# Patient Record
Sex: MALE | Race: WHITE | NOT HISPANIC OR LATINO | URBAN - METROPOLITAN AREA
[De-identification: names, ages, dates, MRNs, and addresses within clinical notes are randomized per-mention and may not be internally consistent; named-entity substitution may affect disease eponyms.]

---

## 2022-08-05 ENCOUNTER — EMERGENCY (EMERGENCY)
Facility: HOSPITAL | Age: 52
LOS: 1 days | Discharge: HOME | End: 2022-08-05
Admitting: EMERGENCY MEDICINE

## 2022-08-05 VITALS
WEIGHT: 205.03 LBS | SYSTOLIC BLOOD PRESSURE: 154 MMHG | HEIGHT: 70 IN | TEMPERATURE: 98 F | DIASTOLIC BLOOD PRESSURE: 105 MMHG | OXYGEN SATURATION: 96 % | HEART RATE: 102 BPM | RESPIRATION RATE: 20 BRPM

## 2022-08-05 PROCEDURE — 72125 CT NECK SPINE W/O DYE: CPT | Mod: 26

## 2022-08-05 PROCEDURE — 99285 EMERGENCY DEPT VISIT HI MDM: CPT | Mod: 25

## 2022-08-05 PROCEDURE — 70450 CT HEAD/BRAIN W/O DYE: CPT | Mod: 26

## 2022-08-05 PROCEDURE — 12001 RPR S/N/AX/GEN/TRNK 2.5CM/<: CPT

## 2022-08-05 PROCEDURE — 93010 ELECTROCARDIOGRAM REPORT: CPT | Mod: NC,59

## 2022-08-05 NOTE — ED ADULT TRIAGE NOTE - CHIEF COMPLAINT QUOTE
Pt walked into ER with S/O c/o head injury/laceration after fall, Pt admits to drinking heavily all day. Pt does not recall falling or LOC. S/O reports Pt calling her to  from [facility]. Unable to assess laceration at triage due to dry blood. Pt denies PMH/NKDA. Pt walked into ER with S/O c/o head injury/laceration after fall, Pt admits to drinking heavily all day. Pt does not recall falling or LOC. S/O reports Pt calling her to  from [facility]. Unable to assess laceration at triage due to dry blood. Pt denies PMH/NKDA. Pt unstable on feet, wheeled to room with S/O.

## 2022-08-05 NOTE — ED ADULT NURSE NOTE - OBJECTIVE STATEMENT
Pt presents to ED complaining of head injury sustained in fall today. Pt arrives with lac, bleeding well controlled. Unclear to LOC. Last tdap unk. Pt intoxicated, poor historian to events.

## 2022-08-05 NOTE — ED ADULT NURSE NOTE - CHIEF COMPLAINT QUOTE
Pt walked into ER with S/O c/o head injury/laceration after fall, Pt admits to drinking heavily all day. Pt does not recall falling or LOC. S/O reports Pt calling her to  from [facility]. Unable to assess laceration at triage due to dry blood. Pt denies PMH/NKDA. Pt unstable on feet, wheeled to room with S/O.

## 2022-08-06 VITALS
TEMPERATURE: 98 F | SYSTOLIC BLOOD PRESSURE: 130 MMHG | RESPIRATION RATE: 18 BRPM | OXYGEN SATURATION: 98 % | HEART RATE: 87 BPM | DIASTOLIC BLOOD PRESSURE: 87 MMHG

## 2022-08-06 NOTE — ED PROVIDER NOTE - OBJECTIVE STATEMENT
51 yo m pmhx sig for chronic etohism pw acute onset of closed head injury w/o loc, pt fell backwards 2 hr pta while drinking ETOH hitting the back of the head against ground with bleeding that stopped with pressure, no ams, no focal deficits. Denies hand numbness and paresthesias.    I have reviewed available current nursing and previous documentation of past medical, surgical, family, and/or social history.

## 2022-08-06 NOTE — ED PROVIDER NOTE - CLINICAL SUMMARY MEDICAL DECISION MAKING FREE TEXT BOX
pt pw closed head injury w/o loc with lac on the occiput with small hematoma and small abrasion on parietal -- neurologically intact. plan: ct brain/jaylainedevonteap

## 2022-08-06 NOTE — ED PROVIDER NOTE - PHYSICAL EXAMINATION
Physical Exam    Vital Signs: I have reviewed the initial vital signs.  Constitutional: well-nourished, appears stated age, no acute distress  Head: + occipital hematoma with 1 cm laceration and L parietal scalp abrasion with small hematoma   Eyes: PERRLA, EOM intact, RAPD absent, and symmetrical lids.  ENT: neck supple with no adenopathy, moist MM.  Cardiovascular: +S1/S2, no murmurs, regular rate, regular rhythm, well-perfused extremities  Respiratory: unlabored respiratory effort, clear to auscultation bilaterally, speaks in full sentences  Gastrointestinal: soft, non-tender abdomen, no pulsatile mass  Neurologic: awake, alert, cranial nerves II-XII grossly intact, extremities’ motor and sensory functions grossly intact, no ataxia, no dysmetria

## 2022-08-06 NOTE — ED PROVIDER NOTE - NSFOLLOWUPINSTRUCTIONS_ED_ALL_ED_FT
Laceration -- Return in 10 days for staple removal    A laceration is a cut that goes through all of the layers of the skin and into the tissue that is right under the skin. Some lacerations heal on their own. Others need to be closed with stitches (sutures), staples, skin adhesive strips, or skin glue. Proper laceration care minimizes the risk of infection and helps the laceration to heal better.     SEEK IMMEDIATE MEDICAL CARE IF YOU HAVE THE FOLLOWING SYMPTOMS: swelling around the wound, worsening pain, drainage from the wound, red streaking going away from your wound, inability to move finger or toe near the laceration, or discoloration of skin near the laceration.       Closed Head Injury    Closed head injury in an injury to your head that may or may not involve a traumatic brain injury (TBI). Symptoms of TBI can be short or long lasting and include headache, dizziness, interference with memory or speech, fatigue, confusion, changes in sleep, mood changes, nausea, depression/anxiety, and dulling of senses. Make sure to obtain proper rest which includes getting plenty of sleep, avoiding excessive visual stimulation, and avoiding activities that may cause physical or mental stress. Avoid any situation where there is potential for another head injury including sports.    SEEK MEDICAL CARE IF YOU HAVE THE FOLLOWING SYMPTOMS: unusual drowsiness, vomiting, severe dizziness, seizures, lightheadedness, muscular weakness, different pupil sizes, visual changes, or clear or bloody discharge from your ears or nose.

## 2022-08-06 NOTE — ED PROVIDER NOTE - PATIENT PORTAL LINK FT
You can access the FollowMyHealth Patient Portal offered by [facility] by registering at the following website: [email or web address]. By joining [employer]’s FollowMyHealth portal, you will also be able to view your health information using other applications (apps) compatible with our system.
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
## 2022-08-08 DIAGNOSIS — R00.0 TACHYCARDIA, UNSPECIFIED: ICD-10-CM

## 2022-08-08 DIAGNOSIS — F10.20 ALCOHOL DEPENDENCE, UNCOMPLICATED: ICD-10-CM

## 2022-08-08 DIAGNOSIS — S09.90XA UNSPECIFIED INJURY OF HEAD, INITIAL ENCOUNTER: ICD-10-CM
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
## 2022-08-08 DIAGNOSIS — S01.01XA LACERATION WITHOUT FOREIGN BODY OF SCALP, INITIAL ENCOUNTER: ICD-10-CM

## 2022-08-08 LAB — GLUCOSE BLDC GLUCOMTR-MCNC: 105 MG/DL — HIGH (ref 70–99)

## 2022-08-22 ENCOUNTER — EMERGENCY (EMERGENCY)
Facility: HOSPITAL | Age: 52
LOS: 1 days | Discharge: HOME | End: 2022-08-22
Admitting: EMERGENCY MEDICINE

## 2022-08-22 VITALS
OXYGEN SATURATION: 98 % | HEIGHT: 70 IN | DIASTOLIC BLOOD PRESSURE: 87 MMHG | SYSTOLIC BLOOD PRESSURE: 146 MMHG | WEIGHT: 199.96 LBS | TEMPERATURE: 99 F | RESPIRATION RATE: 18 BRPM | HEART RATE: 106 BPM
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
## 2022-08-22 DIAGNOSIS — S01.91XD LACERATION WITHOUT FOREIGN BODY OF UNSPECIFIED PART OF HEAD, SUBSEQUENT ENCOUNTER: ICD-10-CM

## 2022-08-22 PROCEDURE — L9995: CPT

## 2022-08-22 NOTE — ED PROVIDER NOTE - CHPI ED SYMPTOMS POS
Caller verified medication dosage, frequency and dispense quantity. The medication(s) are set up as pending and waiting for your approval. The preferred pharmacy has been set up and verified.   
Pt aware prescription refilled. Aware the frequency was changed to every 6hrs and that this will be last refill.   
LACERATION

## 2022-08-22 NOTE — ED PROVIDER NOTE - PATIENT PORTAL LINK FT
You can access the FollowMyHealth Patient Portal offered by [facility] by registering at the following website: [email or web address]. By joining [employer]’s FollowMyHealth portal, you will also be able to view your health information using other applications (apps) compatible with our system.

## 2022-08-22 NOTE — ED ADULT TRIAGE NOTE - BRAND OF COVID-19 VACCINATION
Daily Note     Today's date: 2018  Patient name: Royal Molina  : 1939  MRN: [id number]  Referring provider: Rae Evans MD  Dx:   Encounter Diagnosis     ICD-10-CM    1  Primary osteoarthritis of left knee M17 12    2  Chronic pain of left knee M25 562     G89 29    3  History of total left knee replacement Z96 652                   Subjective: Patient reports he gets staples out tomorrow  No new complaints  Objective: See treatment diary below      Assessment: Patient tolerated session well today  He had no difficulty with added stairs today and has good form and tolerance to new exercises  Patient continues to improve well with function and slowly with mobility  He had atient would benefit from continued PT  Plan: Progress treatment as tolerated           Manual           PROM NV 10' 10' 10'                                                                 Exercise Diary           bike NV NV 5' 6'         Heel slides 10x 15x 10x 15x         SLR 10x 2x10 2x10  2x10          Quad sets NV 10x10s 10x10s 10x10s         SAQ NV 2x10 2" hold 2x10  2x10 3 sec hold          Mini squats  NV x10  2x10           Heel raises  10x 2x10  2x10  2x10          Bridges  NV NV NV np         Standing hip abd NV x10 2x10 2x10         Standing hip ext NV x10 2x10 2x10         Step ups     6" step x 15                                                                                                                     Modalities            CP PRN  8' 10' 8'
Moderna dose 1, 2, and 3

## 2022-08-22 NOTE — ED PROVIDER NOTE - OBJECTIVE STATEMENT
51 y/o male with no PMHx coming in for a staple removal. Patient had 3 staples placed into his head. Denies fever/chills, weakness, and numbness.

## 2022-08-22 NOTE — ED PROVIDER NOTE - CLINICAL SUMMARY MEDICAL DECISION MAKING FREE TEXT BOX
51 y/o Male with no PMHx coming in for a staple removal from his head. Exam unremarkable. Removed 3 staples from his head and anticipate DC.

## 2023-01-01 ENCOUNTER — RESULT REVIEW (OUTPATIENT)
Age: 53
End: 2023-01-01

## 2023-01-01 ENCOUNTER — INPATIENT (INPATIENT)
Facility: HOSPITAL | Age: 53
LOS: 7 days | DRG: 870 | End: 2023-09-22
Attending: INTERNAL MEDICINE | Admitting: INTERNAL MEDICINE
Payer: COMMERCIAL

## 2023-01-01 VITALS
SYSTOLIC BLOOD PRESSURE: 97 MMHG | HEART RATE: 90 BPM | DIASTOLIC BLOOD PRESSURE: 38 MMHG | OXYGEN SATURATION: 100 % | RESPIRATION RATE: 20 BRPM

## 2023-01-01 VITALS — OXYGEN SATURATION: 84 %

## 2023-01-01 DIAGNOSIS — Z71.89 OTHER SPECIFIED COUNSELING: ICD-10-CM

## 2023-01-01 DIAGNOSIS — N17.9 ACUTE KIDNEY FAILURE, UNSPECIFIED: ICD-10-CM

## 2023-01-01 DIAGNOSIS — J96.01 ACUTE RESPIRATORY FAILURE WITH HYPOXIA: ICD-10-CM
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
## 2023-01-01 DIAGNOSIS — Z51.5 ENCOUNTER FOR PALLIATIVE CARE: ICD-10-CM

## 2023-01-01 DIAGNOSIS — K74.60 UNSPECIFIED CIRRHOSIS OF LIVER: ICD-10-CM

## 2023-01-01 DIAGNOSIS — R53.2 FUNCTIONAL QUADRIPLEGIA: ICD-10-CM

## 2023-01-01 DIAGNOSIS — R52 PAIN, UNSPECIFIED: ICD-10-CM

## 2023-01-01 LAB
-  CLINDAMYCIN: SIGNIFICANT CHANGE UP
-  ERYTHROMYCIN: SIGNIFICANT CHANGE UP
-  LINEZOLID: SIGNIFICANT CHANGE UP
-  OXACILLIN: SIGNIFICANT CHANGE UP
-  RIFAMPIN: SIGNIFICANT CHANGE UP
-  TRIMETHOPRIM/SULFAMETHOXAZOLE: SIGNIFICANT CHANGE UP
-  VANCOMYCIN: SIGNIFICANT CHANGE UP
1,3 BETA GLUCAN SER QL: NEGATIVE — SIGNIFICANT CHANGE UP
A1AT SERPL-MCNC: 159 MG/DL — SIGNIFICANT CHANGE UP (ref 90–200)
ACANTHOCYTES BLD QL SMEAR: SLIGHT — SIGNIFICANT CHANGE UP
ALBUMIN SERPL ELPH-MCNC: 2 G/DL — LOW (ref 3.4–5)
ALBUMIN SERPL ELPH-MCNC: 2.1 G/DL — LOW (ref 3.3–5)
ALBUMIN SERPL ELPH-MCNC: 2.4 G/DL — LOW (ref 3.3–5)
ALBUMIN SERPL ELPH-MCNC: 2.6 G/DL — LOW (ref 3.3–5)
ALBUMIN SERPL ELPH-MCNC: 2.8 G/DL — LOW (ref 3.3–5)
ALBUMIN SERPL ELPH-MCNC: 2.9 G/DL — LOW (ref 3.3–5)
ALBUMIN SERPL ELPH-MCNC: 3 G/DL — LOW (ref 3.3–5)
ALBUMIN SERPL ELPH-MCNC: 3.1 G/DL — LOW (ref 3.3–5)
ALBUMIN SERPL ELPH-MCNC: 3.2 G/DL — LOW (ref 3.3–5)
ALBUMIN SERPL ELPH-MCNC: 3.3 G/DL — SIGNIFICANT CHANGE UP (ref 3.3–5)
ALBUMIN SERPL ELPH-MCNC: 3.7 G/DL — SIGNIFICANT CHANGE UP (ref 3.3–5)
ALBUMIN SERPL ELPH-MCNC: 4 G/DL — SIGNIFICANT CHANGE UP (ref 3.3–5)
ALBUMIN SERPL ELPH-MCNC: 5.2 G/DL — HIGH (ref 3.3–5)
ALP SERPL-CCNC: 105 U/L — SIGNIFICANT CHANGE UP (ref 40–120)
ALP SERPL-CCNC: 134 U/L — HIGH (ref 40–120)
ALP SERPL-CCNC: 144 U/L — HIGH (ref 40–120)
ALP SERPL-CCNC: 153 U/L — HIGH (ref 40–120)
ALP SERPL-CCNC: 157 U/L — HIGH (ref 40–120)
ALP SERPL-CCNC: 157 U/L — HIGH (ref 40–120)
ALP SERPL-CCNC: 171 U/L — HIGH (ref 40–120)
ALP SERPL-CCNC: 173 U/L — HIGH (ref 40–120)
ALP SERPL-CCNC: 96 U/L — SIGNIFICANT CHANGE UP (ref 40–120)
ALP SERPL-CCNC: 97 U/L — SIGNIFICANT CHANGE UP (ref 40–120)
ALP SERPL-CCNC: 97 U/L — SIGNIFICANT CHANGE UP (ref 40–120)
ALP SERPL-CCNC: 99 U/L — SIGNIFICANT CHANGE UP (ref 40–120)
ALP SERPL-CCNC: 99 U/L — SIGNIFICANT CHANGE UP (ref 40–120)
ALT FLD-CCNC: 40 U/L — SIGNIFICANT CHANGE UP (ref 10–45)
ALT FLD-CCNC: 41 U/L — SIGNIFICANT CHANGE UP (ref 10–45)
ALT FLD-CCNC: 41 U/L — SIGNIFICANT CHANGE UP (ref 10–45)
ALT FLD-CCNC: 42 U/L — SIGNIFICANT CHANGE UP (ref 10–45)
ALT FLD-CCNC: 43 U/L — SIGNIFICANT CHANGE UP (ref 10–45)
ALT FLD-CCNC: 43 U/L — SIGNIFICANT CHANGE UP (ref 10–45)
ALT FLD-CCNC: 46 U/L — HIGH (ref 12–42)
ALT FLD-CCNC: 47 U/L — HIGH (ref 10–45)
ALT FLD-CCNC: 57 U/L — HIGH (ref 10–45)
ALT FLD-CCNC: 62 U/L — HIGH (ref 10–45)
ALT FLD-CCNC: 73 U/L — HIGH (ref 10–45)
AMMONIA BLD-MCNC: 120 UMOL/L — HIGH (ref 11–55)
AMMONIA BLD-MCNC: 152 UMOL/L — HIGH (ref 11–55)
AMMONIA BLD-MCNC: 169 UMOL/L — HIGH (ref 11–32)
AMMONIA BLD-MCNC: 182 UMOL/L — HIGH (ref 11–55)
AMPHET UR-MCNC: NEGATIVE — SIGNIFICANT CHANGE UP
ANA TITR SER: NEGATIVE — SIGNIFICANT CHANGE UP
ANION GAP SERPL CALC-SCNC: 13 MMOL/L — SIGNIFICANT CHANGE UP (ref 5–17)
ANION GAP SERPL CALC-SCNC: 13 MMOL/L — SIGNIFICANT CHANGE UP (ref 5–17)
ANION GAP SERPL CALC-SCNC: 14 MMOL/L — SIGNIFICANT CHANGE UP (ref 5–17)
ANION GAP SERPL CALC-SCNC: 15 MMOL/L — SIGNIFICANT CHANGE UP (ref 5–17)
ANION GAP SERPL CALC-SCNC: 15 MMOL/L — SIGNIFICANT CHANGE UP (ref 5–17)
ANION GAP SERPL CALC-SCNC: 15 MMOL/L — SIGNIFICANT CHANGE UP (ref 9–16)
ANION GAP SERPL CALC-SCNC: 16 MMOL/L — SIGNIFICANT CHANGE UP (ref 5–17)
ANION GAP SERPL CALC-SCNC: 17 MMOL/L — SIGNIFICANT CHANGE UP (ref 5–17)
ANION GAP SERPL CALC-SCNC: 17 MMOL/L — SIGNIFICANT CHANGE UP (ref 5–17)
ANION GAP SERPL CALC-SCNC: 18 MMOL/L — HIGH (ref 5–17)
ANION GAP SERPL CALC-SCNC: 19 MMOL/L — HIGH (ref 5–17)
ANION GAP SERPL CALC-SCNC: 19 MMOL/L — HIGH (ref 5–17)
ANION GAP SERPL CALC-SCNC: 20 MMOL/L — HIGH (ref 5–17)
ANION GAP SERPL CALC-SCNC: 21 MMOL/L — HIGH (ref 5–17)
ANISOCYTOSIS BLD QL: SIGNIFICANT CHANGE UP
ANISOCYTOSIS BLD QL: SLIGHT — SIGNIFICANT CHANGE UP
APAP SERPL-MCNC: <5 UG/ML — LOW (ref 10–30)
APAP SERPL-MCNC: <5 UG/ML — LOW (ref 10–30)
APPEARANCE UR: ABNORMAL
APPEARANCE UR: ABNORMAL
APTT BLD: 46.6 SEC — HIGH (ref 24.5–35.6)
APTT BLD: 48.9 SEC — HIGH (ref 24.5–35.6)
APTT BLD: 51 SEC — HIGH (ref 24.5–35.6)
APTT BLD: 51.4 SEC — HIGH (ref 24.5–35.6)
APTT BLD: 51.4 SEC — HIGH (ref 24.5–35.6)
APTT BLD: 51.9 SEC — HIGH (ref 24.5–35.6)
APTT BLD: 52.4 SEC — HIGH (ref 24.5–35.6)
APTT BLD: 54.6 SEC — HIGH (ref 24.5–35.6)
APTT BLD: 55.1 SEC — HIGH (ref 24.5–35.6)
APTT BLD: 67.7 SEC — HIGH (ref 24.5–35.6)
APTT BLD: 68.5 SEC — HIGH (ref 24.5–35.6)
APTT BLD: 70.8 SEC — HIGH (ref 24.5–35.6)
APTT BLD: 74.2 SEC — HIGH (ref 24.5–35.6)
APTT BLD: 81.6 SEC — HIGH (ref 24.5–35.6)
AST SERPL-CCNC: 102 U/L — HIGH (ref 10–40)
AST SERPL-CCNC: 103 U/L — HIGH (ref 10–40)
AST SERPL-CCNC: 105 U/L — HIGH (ref 10–40)
AST SERPL-CCNC: 110 U/L — HIGH (ref 10–40)
AST SERPL-CCNC: 113 U/L — HIGH (ref 10–40)
AST SERPL-CCNC: 119 U/L — HIGH (ref 10–40)
AST SERPL-CCNC: 119 U/L — HIGH (ref 10–40)
AST SERPL-CCNC: 129 U/L — HIGH (ref 15–37)
AST SERPL-CCNC: 131 U/L — HIGH (ref 10–40)
AST SERPL-CCNC: 140 U/L — HIGH (ref 10–40)
AST SERPL-CCNC: 172 U/L — HIGH (ref 10–40)
AST SERPL-CCNC: 211 U/L — HIGH (ref 10–40)
AST SERPL-CCNC: 99 U/L — HIGH (ref 10–40)
B PERT IGG+IGM PNL SER: SIGNIFICANT CHANGE UP
BACTERIA # UR AUTO: ABNORMAL /HPF
BACTERIA # UR AUTO: PRESENT /HPF
BARBITURATES UR SCN-MCNC: NEGATIVE — SIGNIFICANT CHANGE UP
BASE EXCESS BLDA CALC-SCNC: -3.4 MMOL/L — LOW (ref -2–3)
BASE EXCESS BLDA CALC-SCNC: -5 MMOL/L — LOW (ref -2–3)
BASE EXCESS BLDA CALC-SCNC: -6.7 MMOL/L — LOW (ref -2–3)
BASE EXCESS BLDA CALC-SCNC: -9 MMOL/L — LOW (ref -2–3)
BASE EXCESS BLDA CALC-SCNC: -9.6 MMOL/L — LOW (ref -2–3)
BASE EXCESS BLDV CALC-SCNC: -6.1 MMOL/L — LOW (ref -2–3)
BASO STIPL BLD QL SMEAR: PRESENT — SIGNIFICANT CHANGE UP
BASO STIPL BLD QL SMEAR: PRESENT — SIGNIFICANT CHANGE UP
BASOPHILS # BLD AUTO: 0 K/UL — SIGNIFICANT CHANGE UP (ref 0–0.2)
BASOPHILS # BLD AUTO: 0.01 K/UL — SIGNIFICANT CHANGE UP (ref 0–0.2)
BASOPHILS # BLD AUTO: 0.01 K/UL — SIGNIFICANT CHANGE UP (ref 0–0.2)
BASOPHILS # BLD AUTO: 0.02 K/UL — SIGNIFICANT CHANGE UP (ref 0–0.2)
BASOPHILS # BLD AUTO: 0.02 K/UL — SIGNIFICANT CHANGE UP (ref 0–0.2)
BASOPHILS # BLD AUTO: 0.04 K/UL — SIGNIFICANT CHANGE UP (ref 0–0.2)
BASOPHILS NFR BLD AUTO: 0 % — SIGNIFICANT CHANGE UP (ref 0–2)
BASOPHILS NFR BLD AUTO: 0.1 % — SIGNIFICANT CHANGE UP (ref 0–2)
BASOPHILS NFR BLD AUTO: 0.2 % — SIGNIFICANT CHANGE UP (ref 0–2)
BENZODIAZ UR-MCNC: NEGATIVE — SIGNIFICANT CHANGE UP
BILIRUB DIRECT SERPL-MCNC: >16 MG/DL — HIGH (ref 0–0.3)
BILIRUB DIRECT SERPL-MCNC: >20 MG/DL — SIGNIFICANT CHANGE UP (ref 0–0.3)
BILIRUB DIRECT SERPL-MCNC: SIGNIFICANT CHANGE UP MG/DL (ref 0–0.3)
BILIRUB INDIRECT FLD-MCNC: SIGNIFICANT CHANGE UP (ref 0.2–1)
BILIRUB INDIRECT FLD-MCNC: SIGNIFICANT CHANGE UP (ref 0.2–1)
BILIRUB SERPL-MCNC: 23.7 MG/DL — HIGH (ref 0.2–1.2)
BILIRUB SERPL-MCNC: 26.5 MG/DL — HIGH (ref 0.2–1.2)
BILIRUB SERPL-MCNC: 26.5 MG/DL — HIGH (ref 0.2–1.2)
BILIRUB SERPL-MCNC: 26.8 MG/DL — HIGH (ref 0.2–1.2)
BILIRUB SERPL-MCNC: 26.9 MG/DL — HIGH (ref 0.2–1.2)
BILIRUB SERPL-MCNC: 27 MG/DL — HIGH (ref 0.2–1.2)
BILIRUB SERPL-MCNC: 27.3 MG/DL — HIGH (ref 0.2–1.2)
BILIRUB SERPL-MCNC: 27.4 MG/DL — HIGH (ref 0.2–1.2)
BILIRUB SERPL-MCNC: 27.5 MG/DL — HIGH (ref 0.2–1.2)
BILIRUB SERPL-MCNC: 28 MG/DL — HIGH (ref 0.2–1.2)
BILIRUB SERPL-MCNC: 28 MG/DL — HIGH (ref 0.2–1.2)
BILIRUB SERPL-MCNC: 28.3 MG/DL — HIGH (ref 0.2–1.2)
BILIRUB SERPL-MCNC: 29.4 MG/DL — HIGH (ref 0.2–1.2)
BILIRUB SERPL-MCNC: >25 MG/DL — HIGH (ref 0.2–1.2)
BILIRUB UR-MCNC: ABNORMAL
BILIRUB UR-MCNC: ABNORMAL
BLD GP AB SCN SERPL QL: NEGATIVE — SIGNIFICANT CHANGE UP
BUN SERPL-MCNC: 16 MG/DL — SIGNIFICANT CHANGE UP (ref 7–23)
BUN SERPL-MCNC: 18 MG/DL — SIGNIFICANT CHANGE UP (ref 7–23)
BUN SERPL-MCNC: 22 MG/DL — SIGNIFICANT CHANGE UP (ref 7–23)
BUN SERPL-MCNC: 24 MG/DL — HIGH (ref 7–23)
BUN SERPL-MCNC: 26 MG/DL — HIGH (ref 7–23)
BUN SERPL-MCNC: 27 MG/DL — HIGH (ref 7–23)
BUN SERPL-MCNC: 27 MG/DL — HIGH (ref 7–23)
BUN SERPL-MCNC: 28 MG/DL — HIGH (ref 7–23)
BUN SERPL-MCNC: 31 MG/DL — HIGH (ref 7–23)
BUN SERPL-MCNC: 37 MG/DL — HIGH (ref 7–23)
BUN SERPL-MCNC: 46 MG/DL — HIGH (ref 7–23)
BUN SERPL-MCNC: 47 MG/DL — HIGH (ref 7–23)
BUN SERPL-MCNC: 48 MG/DL — HIGH (ref 7–23)
BUN SERPL-MCNC: 49 MG/DL — HIGH (ref 7–23)
BUN SERPL-MCNC: 52 MG/DL — HIGH (ref 7–23)
BUN SERPL-MCNC: 53 MG/DL — HIGH (ref 7–23)
BUN SERPL-MCNC: 53 MG/DL — HIGH (ref 7–23)
BUN SERPL-MCNC: 54 MG/DL — HIGH (ref 7–23)
BUN SERPL-MCNC: 55 MG/DL — HIGH (ref 7–23)
BUN SERPL-MCNC: 56 MG/DL — HIGH (ref 7–23)
BURR CELLS BLD QL SMEAR: PRESENT — SIGNIFICANT CHANGE UP
BURR CELLS BLD QL SMEAR: SLIGHT — SIGNIFICANT CHANGE UP
CA-I SERPL-SCNC: 0.89 MMOL/L — LOW (ref 1.15–1.33)
CALCIUM SERPL-MCNC: 7.1 MG/DL — LOW (ref 8.4–10.5)
CALCIUM SERPL-MCNC: 7.2 MG/DL — LOW (ref 8.4–10.5)
CALCIUM SERPL-MCNC: 7.4 MG/DL — LOW (ref 8.4–10.5)
CALCIUM SERPL-MCNC: 7.5 MG/DL — LOW (ref 8.4–10.5)
CALCIUM SERPL-MCNC: 7.6 MG/DL — LOW (ref 8.4–10.5)
CALCIUM SERPL-MCNC: 7.7 MG/DL — LOW (ref 8.4–10.5)
CALCIUM SERPL-MCNC: 7.8 MG/DL — LOW (ref 8.4–10.5)
CALCIUM SERPL-MCNC: 7.9 MG/DL — LOW (ref 8.4–10.5)
CALCIUM SERPL-MCNC: 8 MG/DL — LOW (ref 8.4–10.5)
CALCIUM SERPL-MCNC: 8.2 MG/DL — LOW (ref 8.4–10.5)
CALCIUM SERPL-MCNC: 8.2 MG/DL — LOW (ref 8.5–10.5)
CALCIUM SERPL-MCNC: 8.3 MG/DL — LOW (ref 8.4–10.5)
CALCIUM SERPL-MCNC: 8.4 MG/DL — SIGNIFICANT CHANGE UP (ref 8.4–10.5)
CALCIUM SERPL-MCNC: 8.9 MG/DL — SIGNIFICANT CHANGE UP (ref 8.4–10.5)
CALCIUM SERPL-MCNC: 9 MG/DL — SIGNIFICANT CHANGE UP (ref 8.4–10.5)
CALCIUM SERPL-MCNC: 9 MG/DL — SIGNIFICANT CHANGE UP (ref 8.4–10.5)
CERULOPLASMIN SERPL-MCNC: 13 MG/DL — LOW (ref 15–30)
CHLORIDE SERPL-SCNC: 100 MMOL/L — SIGNIFICANT CHANGE UP (ref 96–108)
CHLORIDE SERPL-SCNC: 102 MMOL/L — SIGNIFICANT CHANGE UP (ref 96–108)
CHLORIDE SERPL-SCNC: 103 MMOL/L — SIGNIFICANT CHANGE UP (ref 96–108)
CHLORIDE SERPL-SCNC: 103 MMOL/L — SIGNIFICANT CHANGE UP (ref 96–108)
CHLORIDE SERPL-SCNC: 104 MMOL/L — SIGNIFICANT CHANGE UP (ref 96–108)
CHLORIDE SERPL-SCNC: 83 MMOL/L — LOW (ref 96–108)
CHLORIDE SERPL-SCNC: 85 MMOL/L — LOW (ref 96–108)
CHLORIDE SERPL-SCNC: 86 MMOL/L — LOW (ref 96–108)
CHLORIDE SERPL-SCNC: 87 MMOL/L — LOW (ref 96–108)
CHLORIDE SERPL-SCNC: 89 MMOL/L — LOW (ref 96–108)
CHLORIDE SERPL-SCNC: 89 MMOL/L — LOW (ref 96–108)
CHLORIDE SERPL-SCNC: 92 MMOL/L — LOW (ref 96–108)
CHLORIDE SERPL-SCNC: 98 MMOL/L — SIGNIFICANT CHANGE UP (ref 96–108)
CK SERPL-CCNC: 24 U/L — LOW (ref 30–200)
CO2 BLDA-SCNC: 15 MMOL/L — LOW (ref 19–24)
CO2 BLDA-SCNC: 15 MMOL/L — LOW (ref 19–24)
CO2 BLDA-SCNC: 18 MMOL/L — LOW (ref 19–24)
CO2 BLDA-SCNC: 20 MMOL/L — SIGNIFICANT CHANGE UP (ref 19–24)
CO2 BLDA-SCNC: 21 MMOL/L — SIGNIFICANT CHANGE UP (ref 19–24)
CO2 BLDV-SCNC: 18.2 MMOL/L — LOW (ref 22–26)
CO2 SERPL-SCNC: 14 MMOL/L — LOW (ref 22–31)
CO2 SERPL-SCNC: 15 MMOL/L — LOW (ref 22–31)
CO2 SERPL-SCNC: 16 MMOL/L — LOW (ref 22–31)
CO2 SERPL-SCNC: 17 MMOL/L — LOW (ref 22–31)
CO2 SERPL-SCNC: 17 MMOL/L — LOW (ref 22–31)
CO2 SERPL-SCNC: 18 MMOL/L — LOW (ref 22–31)
CO2 SERPL-SCNC: 18 MMOL/L — LOW (ref 22–31)
CO2 SERPL-SCNC: 19 MMOL/L — LOW (ref 22–31)
CO2 SERPL-SCNC: 20 MMOL/L — LOW (ref 22–31)
CO2 SERPL-SCNC: 21 MMOL/L — LOW (ref 22–31)
CO2 SERPL-SCNC: 21 MMOL/L — LOW (ref 22–31)
CO2 SERPL-SCNC: 22 MMOL/L — SIGNIFICANT CHANGE UP (ref 22–31)
CO2 SERPL-SCNC: 23 MMOL/L — SIGNIFICANT CHANGE UP (ref 22–31)
COCAINE METAB.OTHER UR-MCNC: NEGATIVE — SIGNIFICANT CHANGE UP
COLOR FLD: SIGNIFICANT CHANGE UP
COLOR SPEC: ABNORMAL
COLOR SPEC: ABNORMAL
COMMENT - FLUIDS: SIGNIFICANT CHANGE UP
COMMENT - URINE: SIGNIFICANT CHANGE UP
CREAT SERPL-MCNC: 1.18 MG/DL — SIGNIFICANT CHANGE UP (ref 0.5–1.3)
CREAT SERPL-MCNC: 1.29 MG/DL — SIGNIFICANT CHANGE UP (ref 0.5–1.3)
CREAT SERPL-MCNC: 1.55 MG/DL — HIGH (ref 0.5–1.3)
CREAT SERPL-MCNC: 1.81 MG/DL — HIGH (ref 0.5–1.3)
CREAT SERPL-MCNC: 1.92 MG/DL — HIGH (ref 0.5–1.3)
CREAT SERPL-MCNC: 2.27 MG/DL — HIGH (ref 0.5–1.3)
CREAT SERPL-MCNC: 2.45 MG/DL — HIGH (ref 0.5–1.3)
CREAT SERPL-MCNC: 2.6 MG/DL — HIGH (ref 0.5–1.3)
CREAT SERPL-MCNC: 3.09 MG/DL — HIGH (ref 0.5–1.3)
CREAT SERPL-MCNC: 3.23 MG/DL — HIGH (ref 0.5–1.3)
CREAT SERPL-MCNC: 3.56 MG/DL — HIGH (ref 0.5–1.3)
CREAT SERPL-MCNC: 4.05 MG/DL — HIGH (ref 0.5–1.3)
CREAT SERPL-MCNC: 5.31 MG/DL — HIGH (ref 0.5–1.3)
CREAT SERPL-MCNC: 5.44 MG/DL — HIGH (ref 0.5–1.3)
CREAT SERPL-MCNC: 5.8 MG/DL — HIGH (ref 0.5–1.3)
CREAT SERPL-MCNC: 5.87 MG/DL — HIGH (ref 0.5–1.3)
CREAT SERPL-MCNC: 5.87 MG/DL — HIGH (ref 0.5–1.3)
CREAT SERPL-MCNC: 5.89 MG/DL — HIGH (ref 0.5–1.3)
CREAT SERPL-MCNC: 6.29 MG/DL — HIGH (ref 0.5–1.3)
CREAT SERPL-MCNC: 6.32 MG/DL — HIGH (ref 0.5–1.3)
CREAT SERPL-MCNC: 6.71 MG/DL — HIGH (ref 0.5–1.3)
CREAT SERPL-MCNC: 6.78 MG/DL — HIGH (ref 0.5–1.3)
CULTURE RESULTS: NO GROWTH — SIGNIFICANT CHANGE UP
CULTURE RESULTS: NO GROWTH — SIGNIFICANT CHANGE UP
CULTURE RESULTS: SIGNIFICANT CHANGE UP
DACRYOCYTES BLD QL SMEAR: SIGNIFICANT CHANGE UP
DACRYOCYTES BLD QL SMEAR: SLIGHT — SIGNIFICANT CHANGE UP
DIFF PNL FLD: ABNORMAL
DIFF PNL FLD: ABNORMAL
EGFR: 10 ML/MIN/1.73M2 — LOW
EGFR: 10 ML/MIN/1.73M2 — LOW
EGFR: 11 ML/MIN/1.73M2 — LOW
EGFR: 12 ML/MIN/1.73M2 — LOW
EGFR: 12 ML/MIN/1.73M2 — LOW
EGFR: 17 ML/MIN/1.73M2 — LOW
EGFR: 19 ML/MIN/1.73M2 — LOW
EGFR: 22 ML/MIN/1.73M2 — LOW
EGFR: 23 ML/MIN/1.73M2 — LOW
EGFR: 28 ML/MIN/1.73M2 — LOW
EGFR: 30 ML/MIN/1.73M2 — LOW
EGFR: 33 ML/MIN/1.73M2 — LOW
EGFR: 41 ML/MIN/1.73M2 — LOW
EGFR: 44 ML/MIN/1.73M2 — LOW
EGFR: 53 ML/MIN/1.73M2 — LOW
EGFR: 65 ML/MIN/1.73M2 — SIGNIFICANT CHANGE UP
EGFR: 73 ML/MIN/1.73M2 — SIGNIFICANT CHANGE UP
EGFR: 9 ML/MIN/1.73M2 — LOW
EGFR: 9 ML/MIN/1.73M2 — LOW
ELLIPTOCYTES BLD QL SMEAR: SLIGHT — SIGNIFICANT CHANGE UP
EOSINOPHIL # BLD AUTO: 0 K/UL — SIGNIFICANT CHANGE UP (ref 0–0.5)
EOSINOPHIL # BLD AUTO: 0.01 K/UL — SIGNIFICANT CHANGE UP (ref 0–0.5)
EOSINOPHIL # BLD AUTO: 0.02 K/UL — SIGNIFICANT CHANGE UP (ref 0–0.5)
EOSINOPHIL NFR BLD AUTO: 0 % — SIGNIFICANT CHANGE UP (ref 0–6)
EOSINOPHIL NFR BLD AUTO: 0.1 % — SIGNIFICANT CHANGE UP (ref 0–6)
EOSINOPHIL NFR BLD AUTO: 0.1 % — SIGNIFICANT CHANGE UP (ref 0–6)
EPI CELLS # UR: ABNORMAL /HPF (ref 0–5)
ETHANOL SERPL-MCNC: <3 MG/DL — SIGNIFICANT CHANGE UP
FERRITIN SERPL-MCNC: 2364 NG/ML — HIGH (ref 30–400)
FERRITIN SERPL-MCNC: 3417 NG/ML — HIGH (ref 30–400)
FIBRINOGEN AG PPP IA-MCNC: 326 MG/DL — SIGNIFICANT CHANGE UP (ref 233–496)
FIBRINOGEN PPP-MCNC: 106 MG/DL — LOW (ref 200–445)
FIBRINOGEN PPP-MCNC: 106 MG/DL — LOW (ref 200–445)
FIBRINOGEN PPP-MCNC: 108 MG/DL — LOW (ref 200–445)
FIBRINOGEN PPP-MCNC: 112 MG/DL — LOW (ref 200–445)
FIBRINOGEN PPP-MCNC: 116 MG/DL — LOW (ref 200–445)
FIBRINOGEN PPP-MCNC: 116 MG/DL — LOW (ref 200–445)
FIBRINOGEN PPP-MCNC: 162 MG/DL — LOW (ref 200–445)
FLUID INTAKE SUBSTANCE CLASS: SIGNIFICANT CHANGE UP
FOLATE SERPL-MCNC: 4.5 NG/ML — LOW
FUNGITELL B-D-GLUCAN,  BRONCHIAL LAVAGE: SIGNIFICANT CHANGE UP
FUNGITELL: 54 PG/ML — SIGNIFICANT CHANGE UP
GALACTOMANNAN AG SERPL-ACNC: 0.17 INDEX — SIGNIFICANT CHANGE UP (ref 0–0.49)
GAS PNL BLDA: SIGNIFICANT CHANGE UP
GAS PNL BLDV: 135 MMOL/L — LOW (ref 136–145)
GAS PNL BLDV: SIGNIFICANT CHANGE UP
GAS PNL BLDV: SIGNIFICANT CHANGE UP
GI PCR PANEL: SIGNIFICANT CHANGE UP
GIANT PLATELETS BLD QL SMEAR: PRESENT — SIGNIFICANT CHANGE UP
GLUCOSE BLDC GLUCOMTR-MCNC: 124 MG/DL — HIGH (ref 70–99)
GLUCOSE BLDC GLUCOMTR-MCNC: 132 MG/DL — HIGH (ref 70–99)
GLUCOSE BLDC GLUCOMTR-MCNC: 50 MG/DL — CRITICAL LOW (ref 70–99)
GLUCOSE BLDC GLUCOMTR-MCNC: 97 MG/DL — SIGNIFICANT CHANGE UP (ref 70–99)
GLUCOSE BLDC GLUCOMTR-MCNC: 99 MG/DL — SIGNIFICANT CHANGE UP (ref 70–99)
GLUCOSE SERPL-MCNC: 107 MG/DL — HIGH (ref 70–99)
GLUCOSE SERPL-MCNC: 107 MG/DL — HIGH (ref 70–99)
GLUCOSE SERPL-MCNC: 111 MG/DL — HIGH (ref 70–99)
GLUCOSE SERPL-MCNC: 113 MG/DL — HIGH (ref 70–99)
GLUCOSE SERPL-MCNC: 117 MG/DL — HIGH (ref 70–99)
GLUCOSE SERPL-MCNC: 120 MG/DL — HIGH (ref 70–99)
GLUCOSE SERPL-MCNC: 124 MG/DL — HIGH (ref 70–99)
GLUCOSE SERPL-MCNC: 129 MG/DL — HIGH (ref 70–99)
GLUCOSE SERPL-MCNC: 144 MG/DL — HIGH (ref 70–99)
GLUCOSE SERPL-MCNC: 163 MG/DL — HIGH (ref 70–99)
GLUCOSE SERPL-MCNC: 164 MG/DL — HIGH (ref 70–99)
GLUCOSE SERPL-MCNC: 166 MG/DL — HIGH (ref 70–99)
GLUCOSE SERPL-MCNC: 182 MG/DL — HIGH (ref 70–99)
GLUCOSE SERPL-MCNC: 50 MG/DL — CRITICAL LOW (ref 70–99)
GLUCOSE SERPL-MCNC: 66 MG/DL — LOW (ref 70–99)
GLUCOSE SERPL-MCNC: 73 MG/DL — SIGNIFICANT CHANGE UP (ref 70–99)
GLUCOSE SERPL-MCNC: 77 MG/DL — SIGNIFICANT CHANGE UP (ref 70–99)
GLUCOSE SERPL-MCNC: 78 MG/DL — SIGNIFICANT CHANGE UP (ref 70–99)
GLUCOSE SERPL-MCNC: 90 MG/DL — SIGNIFICANT CHANGE UP (ref 70–99)
GLUCOSE SERPL-MCNC: 91 MG/DL — SIGNIFICANT CHANGE UP (ref 70–99)
GLUCOSE UR QL: 100
GLUCOSE UR QL: NEGATIVE MG/DL — SIGNIFICANT CHANGE UP
GRAM STN FLD: SIGNIFICANT CHANGE UP
GRAM STN FLD: SIGNIFICANT CHANGE UP
GRAN CASTS # UR COMP ASSIST: ABNORMAL /LPF
HAPTOGLOB SERPL-MCNC: 40 MG/DL — SIGNIFICANT CHANGE UP (ref 34–200)
HAV IGM SER-ACNC: SIGNIFICANT CHANGE UP
HAV IGM SER-ACNC: SIGNIFICANT CHANGE UP
HBV CORE IGM SER-ACNC: SIGNIFICANT CHANGE UP
HBV SURFACE AB SER-ACNC: REACTIVE
HBV SURFACE AG SER-ACNC: SIGNIFICANT CHANGE UP
HCO3 BLDA-SCNC: 14 MMOL/L — LOW (ref 21–28)
HCO3 BLDA-SCNC: 15 MMOL/L — LOW (ref 21–28)
HCO3 BLDA-SCNC: 17 MMOL/L — LOW (ref 21–28)
HCO3 BLDA-SCNC: 19 MMOL/L — LOW (ref 21–28)
HCO3 BLDA-SCNC: 20 MMOL/L — LOW (ref 21–28)
HCO3 BLDV-SCNC: 17 MMOL/L — LOW (ref 22–29)
HCT VFR BLD CALC: 18.6 % — CRITICAL LOW (ref 39–50)
HCT VFR BLD CALC: 18.6 % — CRITICAL LOW (ref 39–50)
HCT VFR BLD CALC: 18.9 % — CRITICAL LOW (ref 39–50)
HCT VFR BLD CALC: 19 % — CRITICAL LOW (ref 39–50)
HCT VFR BLD CALC: 19.7 % — CRITICAL LOW (ref 39–50)
HCT VFR BLD CALC: 19.9 % — CRITICAL LOW (ref 39–50)
HCT VFR BLD CALC: 19.9 % — CRITICAL LOW (ref 39–50)
HCT VFR BLD CALC: 20 % — CRITICAL LOW (ref 39–50)
HCT VFR BLD CALC: 20.8 % — CRITICAL LOW (ref 39–50)
HCT VFR BLD CALC: 20.8 % — CRITICAL LOW (ref 39–50)
HCT VFR BLD CALC: 20.9 % — CRITICAL LOW (ref 39–50)
HCT VFR BLD CALC: 20.9 % — CRITICAL LOW (ref 39–50)
HCT VFR BLD CALC: 21.1 % — LOW (ref 39–50)
HCT VFR BLD CALC: 21.2 % — LOW (ref 39–50)
HCT VFR BLD CALC: 21.3 % — LOW (ref 39–50)
HCT VFR BLD CALC: 21.5 % — LOW (ref 39–50)
HCT VFR BLD CALC: 21.6 % — LOW (ref 39–50)
HCT VFR BLD CALC: 21.7 % — LOW (ref 39–50)
HCT VFR BLD CALC: 22 % — LOW (ref 39–50)
HCT VFR BLD CALC: 22.2 % — LOW (ref 39–50)
HCT VFR BLD CALC: 22.3 % — LOW (ref 39–50)
HCT VFR BLD CALC: 23 % — LOW (ref 39–50)
HCT VFR BLD CALC: 23.3 % — LOW (ref 39–50)
HCT VFR BLD CALC: 23.7 % — LOW (ref 39–50)
HCT VFR BLD CALC: 24.5 % — LOW (ref 39–50)
HCV AB S/CO SERPL IA: 0.04 S/CO — SIGNIFICANT CHANGE UP
HCV AB SERPL-IMP: SIGNIFICANT CHANGE UP
HGB BLD-MCNC: 6.5 G/DL — CRITICAL LOW (ref 13–17)
HGB BLD-MCNC: 6.7 G/DL — CRITICAL LOW (ref 13–17)
HGB BLD-MCNC: 6.8 G/DL — CRITICAL LOW (ref 13–17)
HGB BLD-MCNC: 6.8 G/DL — CRITICAL LOW (ref 13–17)
HGB BLD-MCNC: 7 G/DL — CRITICAL LOW (ref 13–17)
HGB BLD-MCNC: 7.1 G/DL — LOW (ref 13–17)
HGB BLD-MCNC: 7.1 G/DL — LOW (ref 13–17)
HGB BLD-MCNC: 7.2 G/DL — LOW (ref 13–17)
HGB BLD-MCNC: 7.3 G/DL — LOW (ref 13–17)
HGB BLD-MCNC: 7.3 G/DL — LOW (ref 13–17)
HGB BLD-MCNC: 7.4 G/DL — LOW (ref 13–17)
HGB BLD-MCNC: 7.4 G/DL — LOW (ref 13–17)
HGB BLD-MCNC: 7.5 G/DL — LOW (ref 13–17)
HGB BLD-MCNC: 7.6 G/DL — LOW (ref 13–17)
HGB BLD-MCNC: 7.7 G/DL — LOW (ref 13–17)
HGB BLD-MCNC: 7.9 G/DL — LOW (ref 13–17)
HGB BLD-MCNC: 8 G/DL — LOW (ref 13–17)
HGB BLD-MCNC: 8.1 G/DL — LOW (ref 13–17)
HGB BLD-MCNC: 8.3 G/DL — LOW (ref 13–17)
HGB BLD-MCNC: 8.4 G/DL — LOW (ref 13–17)
HIV 1+2 AB+HIV1 P24 AG SERPL QL IA: SIGNIFICANT CHANGE UP
HIV 1+2 AB+HIV1 P24 AG SERPL QL IA: SIGNIFICANT CHANGE UP
HYALINE CASTS # UR AUTO: SIGNIFICANT CHANGE UP /LPF (ref 0–2)
HYPOCHROMIA BLD QL: SIGNIFICANT CHANGE UP
HYPOCHROMIA BLD QL: SLIGHT — SIGNIFICANT CHANGE UP
IMM GRANULOCYTES NFR BLD AUTO: 1.2 % — HIGH (ref 0–0.9)
IMM GRANULOCYTES NFR BLD AUTO: 1.3 % — HIGH (ref 0–0.9)
IMM GRANULOCYTES NFR BLD AUTO: 2 % — HIGH (ref 0–0.9)
IMM GRANULOCYTES NFR BLD AUTO: 2.2 % — HIGH (ref 0–0.9)
IMM GRANULOCYTES NFR BLD AUTO: 4.6 % — HIGH (ref 0–0.9)
INR BLD: 2.03 — HIGH (ref 0.85–1.18)
INR BLD: 2.06 — HIGH (ref 0.85–1.18)
INR BLD: 2.1 — HIGH (ref 0.85–1.18)
INR BLD: 2.15 — HIGH (ref 0.85–1.18)
INR BLD: 2.25 — HIGH (ref 0.85–1.18)
INR BLD: 2.33 — HIGH (ref 0.85–1.18)
INR BLD: 2.37 — HIGH (ref 0.85–1.18)
INR BLD: 2.39 — HIGH (ref 0.85–1.18)
INR BLD: 2.41 — HIGH (ref 0.85–1.18)
INR BLD: 2.42 — HIGH (ref 0.85–1.18)
INR BLD: 2.5 — HIGH (ref 0.85–1.18)
INR BLD: 2.55 — HIGH (ref 0.85–1.18)
INR BLD: 2.56 — HIGH (ref 0.85–1.18)
INR BLD: 2.57 — HIGH (ref 0.85–1.18)
INR BLD: 2.66 — HIGH (ref 0.85–1.18)
IRON SATN MFR SERPL: 103 % — HIGH (ref 16–55)
IRON SATN MFR SERPL: 66 UG/DL — SIGNIFICANT CHANGE UP (ref 45–165)
IRON SATN MFR SERPL: 89 UG/DL — SIGNIFICANT CHANGE UP (ref 45–165)
IRON SATN MFR SERPL: SIGNIFICANT CHANGE UP % (ref 16–55)
KETONES UR-MCNC: 15 MG/DL
KETONES UR-MCNC: NEGATIVE MG/DL — SIGNIFICANT CHANGE UP
LACTATE BLDV-MCNC: 2.2 MMOL/L — HIGH (ref 0.5–2)
LACTATE SERPL-SCNC: 1.6 MMOL/L — SIGNIFICANT CHANGE UP (ref 0.5–2)
LACTATE SERPL-SCNC: 1.6 MMOL/L — SIGNIFICANT CHANGE UP (ref 0.5–2)
LACTATE SERPL-SCNC: 3.3 MMOL/L — HIGH (ref 0.5–2)
LACTATE SERPL-SCNC: 3.6 MMOL/L — HIGH (ref 0.5–2)
LACTATE SERPL-SCNC: 3.7 MMOL/L — HIGH (ref 0.5–2)
LACTATE SERPL-SCNC: 3.7 MMOL/L — HIGH (ref 0.5–2)
LACTATE SERPL-SCNC: 3.9 MMOL/L — HIGH (ref 0.5–2)
LACTATE SERPL-SCNC: 4.1 MMOL/L — CRITICAL HIGH (ref 0.5–2)
LACTATE SERPL-SCNC: 4.8 MMOL/L — CRITICAL HIGH (ref 0.5–2)
LACTATE SERPL-SCNC: 5.1 MMOL/L — CRITICAL HIGH (ref 0.5–2)
LACTATE SERPL-SCNC: 5.3 MMOL/L — CRITICAL HIGH (ref 0.5–2)
LACTATE SERPL-SCNC: 7.1 MMOL/L — CRITICAL HIGH (ref 0.5–2)
LDH SERPL L TO P-CCNC: 248 U/L — HIGH (ref 50–242)
LDH SERPL L TO P-CCNC: 338 U/L — HIGH (ref 50–242)
LEUKOCYTE ESTERASE UR-ACNC: ABNORMAL
LEUKOCYTE ESTERASE UR-ACNC: ABNORMAL
LIDOCAIN IGE QN: 133 U/L — HIGH (ref 7–60)
LIDOCAIN IGE QN: 1586 U/L — HIGH (ref 7–60)
LIDOCAIN IGE QN: 265 U/L — HIGH (ref 16–77)
LIDOCAIN IGE QN: 764 U/L — HIGH (ref 7–60)
LYMPHOCYTES # BLD AUTO: 0.16 K/UL — LOW (ref 1–3.3)
LYMPHOCYTES # BLD AUTO: 0.26 K/UL — LOW (ref 1–3.3)
LYMPHOCYTES # BLD AUTO: 0.26 K/UL — LOW (ref 1–3.3)
LYMPHOCYTES # BLD AUTO: 0.35 K/UL — LOW (ref 1–3.3)
LYMPHOCYTES # BLD AUTO: 0.41 K/UL — LOW (ref 1–3.3)
LYMPHOCYTES # BLD AUTO: 0.53 K/UL — LOW (ref 1–3.3)
LYMPHOCYTES # BLD AUTO: 0.59 K/UL — LOW (ref 1–3.3)
LYMPHOCYTES # BLD AUTO: 0.68 K/UL — LOW (ref 1–3.3)
LYMPHOCYTES # BLD AUTO: 0.9 % — LOW (ref 13–44)
LYMPHOCYTES # BLD AUTO: 0.98 K/UL — LOW (ref 1–3.3)
LYMPHOCYTES # BLD AUTO: 1.31 K/UL — SIGNIFICANT CHANGE UP (ref 1–3.3)
LYMPHOCYTES # BLD AUTO: 1.57 K/UL — SIGNIFICANT CHANGE UP (ref 1–3.3)
LYMPHOCYTES # BLD AUTO: 1.7 % — LOW (ref 13–44)
LYMPHOCYTES # BLD AUTO: 1.74 K/UL — SIGNIFICANT CHANGE UP (ref 1–3.3)
LYMPHOCYTES # BLD AUTO: 10.8 % — LOW (ref 13–44)
LYMPHOCYTES # BLD AUTO: 2.3 % — LOW (ref 13–44)
LYMPHOCYTES # BLD AUTO: 2.6 % — LOW (ref 13–44)
LYMPHOCYTES # BLD AUTO: 2.8 % — LOW (ref 13–44)
LYMPHOCYTES # BLD AUTO: 3.3 % — LOW (ref 13–44)
LYMPHOCYTES # BLD AUTO: 3.5 % — LOW (ref 13–44)
LYMPHOCYTES # BLD AUTO: 4.3 % — LOW (ref 13–44)
LYMPHOCYTES # BLD AUTO: 4.3 % — LOW (ref 13–44)
LYMPHOCYTES # BLD AUTO: 6.2 % — LOW (ref 13–44)
LYMPHOCYTES # BLD AUTO: 6.4 % — LOW (ref 13–44)
LYMPHOCYTES # FLD: 3 % — SIGNIFICANT CHANGE UP
MACROCYTES BLD QL: SIGNIFICANT CHANGE UP
MACROCYTES BLD QL: SLIGHT — SIGNIFICANT CHANGE UP
MAGNESIUM SERPL-MCNC: 1.9 MG/DL — SIGNIFICANT CHANGE UP (ref 1.6–2.6)
MAGNESIUM SERPL-MCNC: 2.1 MG/DL — SIGNIFICANT CHANGE UP (ref 1.6–2.6)
MAGNESIUM SERPL-MCNC: 2.2 MG/DL — SIGNIFICANT CHANGE UP (ref 1.6–2.6)
MAGNESIUM SERPL-MCNC: 2.3 MG/DL — SIGNIFICANT CHANGE UP (ref 1.6–2.6)
MAGNESIUM SERPL-MCNC: 2.3 MG/DL — SIGNIFICANT CHANGE UP (ref 1.6–2.6)
MAGNESIUM SERPL-MCNC: 2.5 MG/DL — SIGNIFICANT CHANGE UP (ref 1.6–2.6)
MANUAL SMEAR VERIFICATION: SIGNIFICANT CHANGE UP
MCHC RBC-ENTMCNC: 33 PG — SIGNIFICANT CHANGE UP (ref 27–34)
MCHC RBC-ENTMCNC: 33.3 PG — SIGNIFICANT CHANGE UP (ref 27–34)
MCHC RBC-ENTMCNC: 33.3 PG — SIGNIFICANT CHANGE UP (ref 27–34)
MCHC RBC-ENTMCNC: 33.5 GM/DL — SIGNIFICANT CHANGE UP (ref 32–36)
MCHC RBC-ENTMCNC: 33.5 PG — SIGNIFICANT CHANGE UP (ref 27–34)
MCHC RBC-ENTMCNC: 33.6 PG — SIGNIFICANT CHANGE UP (ref 27–34)
MCHC RBC-ENTMCNC: 33.8 PG — SIGNIFICANT CHANGE UP (ref 27–34)
MCHC RBC-ENTMCNC: 34 GM/DL — SIGNIFICANT CHANGE UP (ref 32–36)
MCHC RBC-ENTMCNC: 34.1 PG — HIGH (ref 27–34)
MCHC RBC-ENTMCNC: 34.2 GM/DL — SIGNIFICANT CHANGE UP (ref 32–36)
MCHC RBC-ENTMCNC: 34.2 PG — HIGH (ref 27–34)
MCHC RBC-ENTMCNC: 34.3 GM/DL — SIGNIFICANT CHANGE UP (ref 32–36)
MCHC RBC-ENTMCNC: 34.3 GM/DL — SIGNIFICANT CHANGE UP (ref 32–36)
MCHC RBC-ENTMCNC: 34.6 PG — HIGH (ref 27–34)
MCHC RBC-ENTMCNC: 34.7 PG — HIGH (ref 27–34)
MCHC RBC-ENTMCNC: 34.9 GM/DL — SIGNIFICANT CHANGE UP (ref 32–36)
MCHC RBC-ENTMCNC: 34.9 GM/DL — SIGNIFICANT CHANGE UP (ref 32–36)
MCHC RBC-ENTMCNC: 34.9 PG — HIGH (ref 27–34)
MCHC RBC-ENTMCNC: 35 GM/DL — SIGNIFICANT CHANGE UP (ref 32–36)
MCHC RBC-ENTMCNC: 35 GM/DL — SIGNIFICANT CHANGE UP (ref 32–36)
MCHC RBC-ENTMCNC: 35.1 GM/DL — SIGNIFICANT CHANGE UP (ref 32–36)
MCHC RBC-ENTMCNC: 35.2 GM/DL — SIGNIFICANT CHANGE UP (ref 32–36)
MCHC RBC-ENTMCNC: 35.3 GM/DL — SIGNIFICANT CHANGE UP (ref 32–36)
MCHC RBC-ENTMCNC: 35.3 PG — HIGH (ref 27–34)
MCHC RBC-ENTMCNC: 35.4 GM/DL — SIGNIFICANT CHANGE UP (ref 32–36)
MCHC RBC-ENTMCNC: 35.4 GM/DL — SIGNIFICANT CHANGE UP (ref 32–36)
MCHC RBC-ENTMCNC: 35.5 GM/DL — SIGNIFICANT CHANGE UP (ref 32–36)
MCHC RBC-ENTMCNC: 35.6 GM/DL — SIGNIFICANT CHANGE UP (ref 32–36)
MCHC RBC-ENTMCNC: 35.7 GM/DL — SIGNIFICANT CHANGE UP (ref 32–36)
MCHC RBC-ENTMCNC: 35.7 PG — HIGH (ref 27–34)
MCHC RBC-ENTMCNC: 35.9 PG — HIGH (ref 27–34)
MCHC RBC-ENTMCNC: 36 GM/DL — SIGNIFICANT CHANGE UP (ref 32–36)
MCHC RBC-ENTMCNC: 36 PG — HIGH (ref 27–34)
MCHC RBC-ENTMCNC: 36.1 PG — HIGH (ref 27–34)
MCHC RBC-ENTMCNC: 36.5 GM/DL — HIGH (ref 32–36)
MCHC RBC-ENTMCNC: 36.5 GM/DL — HIGH (ref 32–36)
MCHC RBC-ENTMCNC: 36.7 GM/DL — HIGH (ref 32–36)
MCHC RBC-ENTMCNC: 36.8 GM/DL — HIGH (ref 32–36)
MCHC RBC-ENTMCNC: 36.8 PG — HIGH (ref 27–34)
MCHC RBC-ENTMCNC: 37.2 PG — HIGH (ref 27–34)
MCHC RBC-ENTMCNC: 38 PG — HIGH (ref 27–34)
MCV RBC AUTO: 100.4 FL — HIGH (ref 80–100)
MCV RBC AUTO: 101.5 FL — HIGH (ref 80–100)
MCV RBC AUTO: 101.9 FL — HIGH (ref 80–100)
MCV RBC AUTO: 102.1 FL — HIGH (ref 80–100)
MCV RBC AUTO: 102.4 FL — HIGH (ref 80–100)
MCV RBC AUTO: 103.3 FL — HIGH (ref 80–100)
MCV RBC AUTO: 103.3 FL — HIGH (ref 80–100)
MCV RBC AUTO: 93.9 FL — SIGNIFICANT CHANGE UP (ref 80–100)
MCV RBC AUTO: 94.2 FL — SIGNIFICANT CHANGE UP (ref 80–100)
MCV RBC AUTO: 94.5 FL — SIGNIFICANT CHANGE UP (ref 80–100)
MCV RBC AUTO: 94.8 FL — SIGNIFICANT CHANGE UP (ref 80–100)
MCV RBC AUTO: 94.9 FL — SIGNIFICANT CHANGE UP (ref 80–100)
MCV RBC AUTO: 94.9 FL — SIGNIFICANT CHANGE UP (ref 80–100)
MCV RBC AUTO: 95.2 FL — SIGNIFICANT CHANGE UP (ref 80–100)
MCV RBC AUTO: 95.2 FL — SIGNIFICANT CHANGE UP (ref 80–100)
MCV RBC AUTO: 95.6 FL — SIGNIFICANT CHANGE UP (ref 80–100)
MCV RBC AUTO: 96.8 FL — SIGNIFICANT CHANGE UP (ref 80–100)
MCV RBC AUTO: 96.9 FL — SIGNIFICANT CHANGE UP (ref 80–100)
MCV RBC AUTO: 97.2 FL — SIGNIFICANT CHANGE UP (ref 80–100)
MCV RBC AUTO: 97.7 FL — SIGNIFICANT CHANGE UP (ref 80–100)
MCV RBC AUTO: 97.9 FL — SIGNIFICANT CHANGE UP (ref 80–100)
MCV RBC AUTO: 98.1 FL — SIGNIFICANT CHANGE UP (ref 80–100)
MCV RBC AUTO: 99 FL — SIGNIFICANT CHANGE UP (ref 80–100)
MCV RBC AUTO: 99.1 FL — SIGNIFICANT CHANGE UP (ref 80–100)
MCV RBC AUTO: 99.6 FL — SIGNIFICANT CHANGE UP (ref 80–100)
MELD SCORE WITH DIALYSIS: 40 POINTS — SIGNIFICANT CHANGE UP
MELD SCORE WITHOUT DIALYSIS: 40 POINTS — SIGNIFICANT CHANGE UP
METAMYELOCYTES # FLD: 0.9 % — HIGH (ref 0–0)
METHADONE UR-MCNC: NEGATIVE — SIGNIFICANT CHANGE UP
METHOD TYPE: SIGNIFICANT CHANGE UP
MICROCYTES BLD QL: SLIGHT — SIGNIFICANT CHANGE UP
MITOCHONDRIA M2 AB SER IA-ACNC: SIGNIFICANT CHANGE UP
MONOCYTES # BLD AUTO: 0.51 K/UL — SIGNIFICANT CHANGE UP (ref 0–0.9)
MONOCYTES # BLD AUTO: 0.59 K/UL — SIGNIFICANT CHANGE UP (ref 0–0.9)
MONOCYTES # BLD AUTO: 0.85 K/UL — SIGNIFICANT CHANGE UP (ref 0–0.9)
MONOCYTES # BLD AUTO: 1.01 K/UL — HIGH (ref 0–0.9)
MONOCYTES # BLD AUTO: 1.05 K/UL — HIGH (ref 0–0.9)
MONOCYTES # BLD AUTO: 1.09 K/UL — HIGH (ref 0–0.9)
MONOCYTES # BLD AUTO: 1.19 K/UL — HIGH (ref 0–0.9)
MONOCYTES # BLD AUTO: 1.94 K/UL — HIGH (ref 0–0.9)
MONOCYTES # BLD AUTO: 2.06 K/UL — HIGH (ref 0–0.9)
MONOCYTES # BLD AUTO: 2.47 K/UL — HIGH (ref 0–0.9)
MONOCYTES # BLD AUTO: 2.92 K/UL — HIGH (ref 0–0.9)
MONOCYTES # BLD AUTO: 2.94 K/UL — HIGH (ref 0–0.9)
MONOCYTES NFR BLD AUTO: 10.6 % — SIGNIFICANT CHANGE UP (ref 2–14)
MONOCYTES NFR BLD AUTO: 12 % — SIGNIFICANT CHANGE UP (ref 2–14)
MONOCYTES NFR BLD AUTO: 12.2 % — SIGNIFICANT CHANGE UP (ref 2–14)
MONOCYTES NFR BLD AUTO: 12.2 % — SIGNIFICANT CHANGE UP (ref 2–14)
MONOCYTES NFR BLD AUTO: 12.8 % — SIGNIFICANT CHANGE UP (ref 2–14)
MONOCYTES NFR BLD AUTO: 2.6 % — SIGNIFICANT CHANGE UP (ref 2–14)
MONOCYTES NFR BLD AUTO: 4.5 % — SIGNIFICANT CHANGE UP (ref 2–14)
MONOCYTES NFR BLD AUTO: 5.9 % — SIGNIFICANT CHANGE UP (ref 2–14)
MONOCYTES NFR BLD AUTO: 6.7 % — SIGNIFICANT CHANGE UP (ref 2–14)
MONOCYTES NFR BLD AUTO: 6.9 % — SIGNIFICANT CHANGE UP (ref 2–14)
MONOCYTES NFR BLD AUTO: 8.8 % — SIGNIFICANT CHANGE UP (ref 2–14)
MONOCYTES NFR BLD AUTO: 9 % — SIGNIFICANT CHANGE UP (ref 2–14)
MONOS+MACROS # FLD: 31 % — SIGNIFICANT CHANGE UP
MYELOCYTES NFR BLD: 0.9 % — HIGH (ref 0–0)
MYELOCYTES NFR BLD: 1.8 % — HIGH (ref 0–0)
NEUTROPHILS # BLD AUTO: 10.37 K/UL — HIGH (ref 1.8–7.4)
NEUTROPHILS # BLD AUTO: 11.3 K/UL — HIGH (ref 1.8–7.4)
NEUTROPHILS # BLD AUTO: 12.85 K/UL — HIGH (ref 1.8–7.4)
NEUTROPHILS # BLD AUTO: 13.16 K/UL — HIGH (ref 1.8–7.4)
NEUTROPHILS # BLD AUTO: 15.12 K/UL — HIGH (ref 1.8–7.4)
NEUTROPHILS # BLD AUTO: 15.74 K/UL — HIGH (ref 1.8–7.4)
NEUTROPHILS # BLD AUTO: 18.8 K/UL — HIGH (ref 1.8–7.4)
NEUTROPHILS # BLD AUTO: 20.2 K/UL — HIGH (ref 1.8–7.4)
NEUTROPHILS # BLD AUTO: 21.04 K/UL — HIGH (ref 1.8–7.4)
NEUTROPHILS # BLD AUTO: 23.89 K/UL — HIGH (ref 1.8–7.4)
NEUTROPHILS # BLD AUTO: 8.38 K/UL — HIGH (ref 1.8–7.4)
NEUTROPHILS # BLD AUTO: 9.4 K/UL — HIGH (ref 1.8–7.4)
NEUTROPHILS NFR BLD AUTO: 75.7 % — SIGNIFICANT CHANGE UP (ref 43–77)
NEUTROPHILS NFR BLD AUTO: 76.7 % — SIGNIFICANT CHANGE UP (ref 43–77)
NEUTROPHILS NFR BLD AUTO: 80 % — HIGH (ref 43–77)
NEUTROPHILS NFR BLD AUTO: 81.6 % — HIGH (ref 43–77)
NEUTROPHILS NFR BLD AUTO: 83.2 % — HIGH (ref 43–77)
NEUTROPHILS NFR BLD AUTO: 84.1 % — HIGH (ref 43–77)
NEUTROPHILS NFR BLD AUTO: 84.3 % — HIGH (ref 43–77)
NEUTROPHILS NFR BLD AUTO: 88.4 % — HIGH (ref 43–77)
NEUTROPHILS NFR BLD AUTO: 89.2 % — HIGH (ref 43–77)
NEUTROPHILS NFR BLD AUTO: 91.3 % — HIGH (ref 43–77)
NEUTROPHILS NFR BLD AUTO: 91.8 % — HIGH (ref 43–77)
NEUTROPHILS NFR BLD AUTO: 92.2 % — HIGH (ref 43–77)
NEUTROPHILS-BODY FLUID: 13 % — SIGNIFICANT CHANGE UP
NEUTS BAND # BLD: 0.8 % — SIGNIFICANT CHANGE UP (ref 0–8)
NEUTS BAND # BLD: 0.9 % — SIGNIFICANT CHANGE UP (ref 0–8)
NEUTS BAND # BLD: 1.8 % — SIGNIFICANT CHANGE UP (ref 0–8)
NEUTS BAND # BLD: 1.8 % — SIGNIFICANT CHANGE UP (ref 0–8)
NITRITE UR-MCNC: POSITIVE
NITRITE UR-MCNC: POSITIVE
NON-GYNECOLOGICAL CYTOLOGY STUDY: SIGNIFICANT CHANGE UP
NRBC # BLD: 0 /100 WBCS — SIGNIFICANT CHANGE UP (ref 0–0)
NRBC # BLD: 1 /100 WBCS — HIGH (ref 0–0)
NRBC # BLD: 1 /100 — HIGH (ref 0–0)
NRBC # BLD: 2 /100 WBCS — HIGH (ref 0–0)
NRBC # BLD: 2 /100 WBCS — HIGH (ref 0–0)
NRBC # BLD: 3 /100 — HIGH (ref 0–0)
NRBC # BLD: SIGNIFICANT CHANGE UP /100 WBCS (ref 0–0)
NRBC # BLD: SIGNIFICANT CHANGE UP /100 WBCS (ref 0–0)
NT-PROBNP SERPL-SCNC: 2505 PG/ML — HIGH
OPIATES UR-MCNC: NEGATIVE — SIGNIFICANT CHANGE UP
ORGANISM # SPEC MICROSCOPIC CNT: SIGNIFICANT CHANGE UP
ORGANISM # SPEC MICROSCOPIC CNT: SIGNIFICANT CHANGE UP
OSMOLALITY SERPL: 276 MOSM/KG — SIGNIFICANT CHANGE UP (ref 275–300)
OSMOLALITY UR: 290 MOSM/KG — LOW (ref 300–900)
OVALOCYTES BLD QL SMEAR: SLIGHT — SIGNIFICANT CHANGE UP
PCO2 BLDA: 25 MMHG — LOW (ref 35–48)
PCO2 BLDA: 27 MMHG — LOW (ref 35–48)
PCO2 BLDA: 30 MMHG — LOW (ref 35–48)
PCO2 BLDA: 30 MMHG — LOW (ref 35–48)
PCO2 BLDA: 31 MMHG — LOW (ref 35–48)
PCO2 BLDV: 28 MMHG — LOW (ref 42–55)
PCO2 BLDV: 28 MMHG — LOW (ref 42–55)
PCP SPEC-MCNC: SIGNIFICANT CHANGE UP
PCP UR-MCNC: NEGATIVE — SIGNIFICANT CHANGE UP
PH BLDA: 7.34 — LOW (ref 7.35–7.45)
PH BLDA: 7.37 — SIGNIFICANT CHANGE UP (ref 7.35–7.45)
PH BLDA: 7.37 — SIGNIFICANT CHANGE UP (ref 7.35–7.45)
PH BLDA: 7.4 — SIGNIFICANT CHANGE UP (ref 7.35–7.45)
PH BLDA: 7.42 — SIGNIFICANT CHANGE UP (ref 7.35–7.45)
PH BLDV: 7.39 — SIGNIFICANT CHANGE UP (ref 7.32–7.43)
PH BLDV: 7.4 — SIGNIFICANT CHANGE UP (ref 7.32–7.43)
PH UR: 5 — SIGNIFICANT CHANGE UP (ref 5–8)
PH UR: 6.5 — SIGNIFICANT CHANGE UP (ref 5–8)
PHOSPHATE SERPL-MCNC: 2.2 MG/DL — LOW (ref 2.5–4.5)
PHOSPHATE SERPL-MCNC: 2.8 MG/DL — SIGNIFICANT CHANGE UP (ref 2.5–4.5)
PHOSPHATE SERPL-MCNC: 3.5 MG/DL — SIGNIFICANT CHANGE UP (ref 2.5–4.5)
PHOSPHATE SERPL-MCNC: 4.5 MG/DL — SIGNIFICANT CHANGE UP (ref 2.5–4.5)
PHOSPHATE SERPL-MCNC: 5 MG/DL — HIGH (ref 2.5–4.5)
PHOSPHATE SERPL-MCNC: 5.1 MG/DL — HIGH (ref 2.5–4.5)
PHOSPHATE SERPL-MCNC: 5.4 MG/DL — HIGH (ref 2.5–4.5)
PHOSPHATE SERPL-MCNC: 6 MG/DL — HIGH (ref 2.5–4.5)
PHOSPHATE SERPL-MCNC: 7 MG/DL — HIGH (ref 2.5–4.5)
PHOSPHATE SERPL-MCNC: 7.3 MG/DL — HIGH (ref 2.5–4.5)
PLAT MORPH BLD: ABNORMAL
PLAT MORPH BLD: NORMAL — SIGNIFICANT CHANGE UP
PLAT MORPH BLD: NORMAL — SIGNIFICANT CHANGE UP
PLATELET # BLD AUTO: 100 K/UL — LOW (ref 150–400)
PLATELET # BLD AUTO: 100 K/UL — LOW (ref 150–400)
PLATELET # BLD AUTO: 101 K/UL — LOW (ref 150–400)
PLATELET # BLD AUTO: 102 K/UL — LOW (ref 150–400)
PLATELET # BLD AUTO: 103 K/UL — LOW (ref 150–400)
PLATELET # BLD AUTO: 103 K/UL — LOW (ref 150–400)
PLATELET # BLD AUTO: 106 K/UL — LOW (ref 150–400)
PLATELET # BLD AUTO: 107 K/UL — LOW (ref 150–400)
PLATELET # BLD AUTO: 107 K/UL — LOW (ref 150–400)
PLATELET # BLD AUTO: 115 K/UL — LOW (ref 150–400)
PLATELET # BLD AUTO: 122 K/UL — LOW (ref 150–400)
PLATELET # BLD AUTO: 123 K/UL — LOW (ref 150–400)
PLATELET # BLD AUTO: 125 K/UL — LOW (ref 150–400)
PLATELET # BLD AUTO: 136 K/UL — LOW (ref 150–400)
PLATELET # BLD AUTO: 144 K/UL — LOW (ref 150–400)
PLATELET # BLD AUTO: 146 K/UL — LOW (ref 150–400)
PLATELET # BLD AUTO: 147 K/UL — LOW (ref 150–400)
PLATELET # BLD AUTO: 148 K/UL — LOW (ref 150–400)
PLATELET # BLD AUTO: 149 K/UL — LOW (ref 150–400)
PLATELET # BLD AUTO: 73 K/UL — LOW (ref 150–400)
PLATELET # BLD AUTO: 83 K/UL — LOW (ref 150–400)
PLATELET # BLD AUTO: 85 K/UL — LOW (ref 150–400)
PLATELET # BLD AUTO: 90 K/UL — LOW (ref 150–400)
PLATELET # BLD AUTO: 95 K/UL — LOW (ref 150–400)
PLATELET # BLD AUTO: 98 K/UL — LOW (ref 150–400)
PO2 BLDA: 101 MMHG — SIGNIFICANT CHANGE UP (ref 83–108)
PO2 BLDA: 114 MMHG — HIGH (ref 83–108)
PO2 BLDA: 68 MMHG — LOW (ref 83–108)
PO2 BLDA: 82 MMHG — LOW (ref 83–108)
PO2 BLDA: 87 MMHG — SIGNIFICANT CHANGE UP (ref 83–108)
PO2 BLDV: 45 MMHG — SIGNIFICANT CHANGE UP (ref 25–45)
PO2 BLDV: 77 MMHG — HIGH (ref 25–45)
POIKILOCYTOSIS BLD QL AUTO: SIGNIFICANT CHANGE UP
POLYCHROMASIA BLD QL SMEAR: SLIGHT — SIGNIFICANT CHANGE UP
POTASSIUM BLDV-SCNC: 4.6 MMOL/L — SIGNIFICANT CHANGE UP (ref 3.5–5.1)
POTASSIUM SERPL-MCNC: 3.2 MMOL/L — LOW (ref 3.5–5.3)
POTASSIUM SERPL-MCNC: 3.3 MMOL/L — LOW (ref 3.5–5.3)
POTASSIUM SERPL-MCNC: 3.6 MMOL/L — SIGNIFICANT CHANGE UP (ref 3.5–5.3)
POTASSIUM SERPL-MCNC: 3.7 MMOL/L — SIGNIFICANT CHANGE UP (ref 3.5–5.3)
POTASSIUM SERPL-MCNC: 4 MMOL/L — SIGNIFICANT CHANGE UP (ref 3.5–5.3)
POTASSIUM SERPL-MCNC: 4.3 MMOL/L — SIGNIFICANT CHANGE UP (ref 3.5–5.3)
POTASSIUM SERPL-MCNC: 4.4 MMOL/L — SIGNIFICANT CHANGE UP (ref 3.5–5.3)
POTASSIUM SERPL-MCNC: 4.5 MMOL/L — SIGNIFICANT CHANGE UP (ref 3.5–5.3)
POTASSIUM SERPL-MCNC: 4.6 MMOL/L — SIGNIFICANT CHANGE UP (ref 3.5–5.3)
POTASSIUM SERPL-MCNC: 4.7 MMOL/L — SIGNIFICANT CHANGE UP (ref 3.5–5.3)
POTASSIUM SERPL-MCNC: 4.8 MMOL/L — SIGNIFICANT CHANGE UP (ref 3.5–5.3)
POTASSIUM SERPL-MCNC: 4.9 MMOL/L — SIGNIFICANT CHANGE UP (ref 3.5–5.3)
POTASSIUM SERPL-MCNC: 5 MMOL/L — SIGNIFICANT CHANGE UP (ref 3.5–5.3)
POTASSIUM SERPL-MCNC: 5.3 MMOL/L — SIGNIFICANT CHANGE UP (ref 3.5–5.3)
POTASSIUM SERPL-SCNC: 3.2 MMOL/L — LOW (ref 3.5–5.3)
POTASSIUM SERPL-SCNC: 3.3 MMOL/L — LOW (ref 3.5–5.3)
POTASSIUM SERPL-SCNC: 3.6 MMOL/L — SIGNIFICANT CHANGE UP (ref 3.5–5.3)
POTASSIUM SERPL-SCNC: 3.7 MMOL/L — SIGNIFICANT CHANGE UP (ref 3.5–5.3)
POTASSIUM SERPL-SCNC: 4 MMOL/L — SIGNIFICANT CHANGE UP (ref 3.5–5.3)
POTASSIUM SERPL-SCNC: 4.3 MMOL/L — SIGNIFICANT CHANGE UP (ref 3.5–5.3)
POTASSIUM SERPL-SCNC: 4.4 MMOL/L — SIGNIFICANT CHANGE UP (ref 3.5–5.3)
POTASSIUM SERPL-SCNC: 4.5 MMOL/L — SIGNIFICANT CHANGE UP (ref 3.5–5.3)
POTASSIUM SERPL-SCNC: 4.6 MMOL/L — SIGNIFICANT CHANGE UP (ref 3.5–5.3)
POTASSIUM SERPL-SCNC: 4.7 MMOL/L — SIGNIFICANT CHANGE UP (ref 3.5–5.3)
POTASSIUM SERPL-SCNC: 4.8 MMOL/L — SIGNIFICANT CHANGE UP (ref 3.5–5.3)
POTASSIUM SERPL-SCNC: 4.9 MMOL/L — SIGNIFICANT CHANGE UP (ref 3.5–5.3)
POTASSIUM SERPL-SCNC: 5 MMOL/L — SIGNIFICANT CHANGE UP (ref 3.5–5.3)
POTASSIUM SERPL-SCNC: 5.3 MMOL/L — SIGNIFICANT CHANGE UP (ref 3.5–5.3)
PROCALCITONIN SERPL-MCNC: 0.6 NG/ML — HIGH (ref 0.02–0.1)
PROMYELOCYTES # FLD: 0.9 % — HIGH (ref 0–0)
PROT SERPL-MCNC: 5 G/DL — LOW (ref 6–8.3)
PROT SERPL-MCNC: 5 G/DL — LOW (ref 6–8.3)
PROT SERPL-MCNC: 5.1 G/DL — LOW (ref 6–8.3)
PROT SERPL-MCNC: 5.3 G/DL — LOW (ref 6–8.3)
PROT SERPL-MCNC: 5.3 G/DL — LOW (ref 6–8.3)
PROT SERPL-MCNC: 5.4 G/DL — LOW (ref 6–8.3)
PROT SERPL-MCNC: 5.4 G/DL — LOW (ref 6–8.3)
PROT SERPL-MCNC: 5.7 G/DL — LOW (ref 6.4–8.2)
PROT SERPL-MCNC: 5.7 G/DL — LOW (ref 6–8.3)
PROT SERPL-MCNC: 5.7 G/DL — LOW (ref 6–8.3)
PROT SERPL-MCNC: 7 G/DL — SIGNIFICANT CHANGE UP (ref 6–8.3)
PROT UR-MCNC: 100 MG/DL
PROT UR-MCNC: 30 MG/DL
PROTHROM AB SERPL-ACNC: 22.7 SEC — HIGH (ref 9.5–13)
PROTHROM AB SERPL-ACNC: 23 SEC — HIGH (ref 9.5–13)
PROTHROM AB SERPL-ACNC: 23.4 SEC — HIGH (ref 9.5–13)
PROTHROM AB SERPL-ACNC: 24 SEC — HIGH (ref 9.5–13)
PROTHROM AB SERPL-ACNC: 25 SEC — HIGH (ref 9.5–13)
PROTHROM AB SERPL-ACNC: 25.9 SEC — HIGH (ref 9.5–13)
PROTHROM AB SERPL-ACNC: 26.3 SEC — HIGH (ref 9.5–13)
PROTHROM AB SERPL-ACNC: 26.6 SEC — HIGH (ref 9.5–13)
PROTHROM AB SERPL-ACNC: 26.8 SEC — HIGH (ref 9.5–13)
PROTHROM AB SERPL-ACNC: 26.9 SEC — HIGH (ref 9.5–13)
PROTHROM AB SERPL-ACNC: 27.4 SEC — HIGH (ref 9.5–13)
PROTHROM AB SERPL-ACNC: 27.8 SEC — HIGH (ref 9.5–13)
PROTHROM AB SERPL-ACNC: 28.3 SEC — HIGH (ref 9.5–13)
PROTHROM AB SERPL-ACNC: 28.5 SEC — HIGH (ref 9.5–13)
PROTHROM AB SERPL-ACNC: 29.5 SEC — HIGH (ref 9.5–13)
RBC # BLD: 1.8 M/UL — LOW (ref 4.2–5.8)
RBC # BLD: 1.84 M/UL — LOW (ref 4.2–5.8)
RBC # BLD: 1.93 M/UL — LOW (ref 4.2–5.8)
RBC # BLD: 1.95 M/UL — LOW (ref 4.2–5.8)
RBC # BLD: 1.96 M/UL — LOW (ref 4.2–5.8)
RBC # BLD: 1.96 M/UL — LOW (ref 4.2–5.8)
RBC # BLD: 2.01 M/UL — LOW (ref 4.2–5.8)
RBC # BLD: 2.06 M/UL — LOW (ref 4.2–5.8)
RBC # BLD: 2.11 M/UL — LOW (ref 4.2–5.8)
RBC # BLD: 2.13 M/UL — LOW (ref 4.2–5.8)
RBC # BLD: 2.16 M/UL — LOW (ref 4.2–5.8)
RBC # BLD: 2.16 M/UL — LOW (ref 4.2–5.8)
RBC # BLD: 2.2 M/UL — LOW (ref 4.2–5.8)
RBC # BLD: 2.24 M/UL — LOW (ref 4.2–5.8)
RBC # BLD: 2.26 M/UL — LOW (ref 4.2–5.8)
RBC # BLD: 2.28 M/UL — LOW (ref 4.2–5.8)
RBC # BLD: 2.29 M/UL — LOW (ref 4.2–5.8)
RBC # BLD: 2.3 M/UL — LOW (ref 4.2–5.8)
RBC # BLD: 2.31 M/UL — LOW (ref 4.2–5.8)
RBC # BLD: 2.34 M/UL — LOW (ref 4.2–5.8)
RBC # BLD: 2.38 M/UL — LOW (ref 4.2–5.8)
RBC # BLD: 2.48 M/UL — LOW (ref 4.2–5.8)
RBC # BLD: 2.52 M/UL — LOW (ref 4.2–5.8)
RBC # FLD: 14.8 % — HIGH (ref 10.3–14.5)
RBC # FLD: 15.1 % — HIGH (ref 10.3–14.5)
RBC # FLD: 15.1 % — HIGH (ref 10.3–14.5)
RBC # FLD: 16.3 % — HIGH (ref 10.3–14.5)
RBC # FLD: 17.1 % — HIGH (ref 10.3–14.5)
RBC # FLD: 17.3 % — HIGH (ref 10.3–14.5)
RBC # FLD: 18.3 % — HIGH (ref 10.3–14.5)
RBC # FLD: 18.6 % — HIGH (ref 10.3–14.5)
RBC # FLD: 18.6 % — HIGH (ref 10.3–14.5)
RBC # FLD: 18.7 % — HIGH (ref 10.3–14.5)
RBC # FLD: 18.9 % — HIGH (ref 10.3–14.5)
RBC # FLD: 19.4 % — HIGH (ref 10.3–14.5)
RBC # FLD: 19.6 % — HIGH (ref 10.3–14.5)
RBC # FLD: 19.7 % — HIGH (ref 10.3–14.5)
RBC # FLD: 20.1 % — HIGH (ref 10.3–14.5)
RBC # FLD: 20.5 % — HIGH (ref 10.3–14.5)
RBC # FLD: 20.7 % — HIGH (ref 10.3–14.5)
RBC # FLD: 21.9 % — HIGH (ref 10.3–14.5)
RBC # FLD: 22.1 % — HIGH (ref 10.3–14.5)
RBC # FLD: 22.5 % — HIGH (ref 10.3–14.5)
RBC # FLD: 22.6 % — HIGH (ref 10.3–14.5)
RBC # FLD: 22.7 % — HIGH (ref 10.3–14.5)
RBC # FLD: 22.7 % — HIGH (ref 10.3–14.5)
RBC # FLD: 22.8 % — HIGH (ref 10.3–14.5)
RBC # FLD: 23.4 % — HIGH (ref 10.3–14.5)
RBC BLD AUTO: ABNORMAL
RBC CASTS # UR COMP ASSIST: 32 /HPF — HIGH (ref 0–4)
RBC CASTS # UR COMP ASSIST: ABNORMAL /HPF
RCV VOL RI: 5150 /UL — HIGH (ref 0–0)
RH IG SCN BLD-IMP: POSITIVE — SIGNIFICANT CHANGE UP
SALICYLATES SERPL-MCNC: <0.3 MG/DL — LOW (ref 2.8–20)
SAO2 % BLDA: 95.1 % — SIGNIFICANT CHANGE UP (ref 94–98)
SAO2 % BLDA: 96.2 % — SIGNIFICANT CHANGE UP (ref 94–98)
SAO2 % BLDA: 97.9 % — SIGNIFICANT CHANGE UP (ref 94–98)
SAO2 % BLDA: 99.4 % — HIGH (ref 94–98)
SAO2 % BLDA: 99.6 % — HIGH (ref 94–98)
SAO2 % BLDV: 75.7 % — SIGNIFICANT CHANGE UP (ref 67–88)
SAO2 % BLDV: 96.7 % — HIGH (ref 67–88)
SARS-COV-2 RNA SPEC QL NAA+PROBE: SIGNIFICANT CHANGE UP
SCHISTOCYTES BLD QL AUTO: SLIGHT — SIGNIFICANT CHANGE UP
SMOOTH MUSCLE AB SER-ACNC: SIGNIFICANT CHANGE UP
SMUDGE CELLS # BLD: PRESENT — SIGNIFICANT CHANGE UP
SODIUM SERPL-SCNC: 116 MMOL/L — CRITICAL LOW (ref 135–145)
SODIUM SERPL-SCNC: 118 MMOL/L — CRITICAL LOW (ref 132–145)
SODIUM SERPL-SCNC: 120 MMOL/L — CRITICAL LOW (ref 135–145)
SODIUM SERPL-SCNC: 120 MMOL/L — CRITICAL LOW (ref 135–145)
SODIUM SERPL-SCNC: 121 MMOL/L — LOW (ref 135–145)
SODIUM SERPL-SCNC: 123 MMOL/L — LOW (ref 135–145)
SODIUM SERPL-SCNC: 123 MMOL/L — LOW (ref 135–145)
SODIUM SERPL-SCNC: 128 MMOL/L — LOW (ref 135–145)
SODIUM SERPL-SCNC: 135 MMOL/L — SIGNIFICANT CHANGE UP (ref 135–145)
SODIUM SERPL-SCNC: 136 MMOL/L — SIGNIFICANT CHANGE UP (ref 135–145)
SODIUM SERPL-SCNC: 136 MMOL/L — SIGNIFICANT CHANGE UP (ref 135–145)
SODIUM SERPL-SCNC: 137 MMOL/L — SIGNIFICANT CHANGE UP (ref 135–145)
SODIUM SERPL-SCNC: 139 MMOL/L — SIGNIFICANT CHANGE UP (ref 135–145)
SODIUM SERPL-SCNC: 139 MMOL/L — SIGNIFICANT CHANGE UP (ref 135–145)
SODIUM SERPL-SCNC: 140 MMOL/L — SIGNIFICANT CHANGE UP (ref 135–145)
SODIUM SERPL-SCNC: 142 MMOL/L — SIGNIFICANT CHANGE UP (ref 135–145)
SODIUM UR-SCNC: <20 MMOL/L — SIGNIFICANT CHANGE UP
SP GR SPEC: 1.02 — SIGNIFICANT CHANGE UP (ref 1–1.03)
SP GR SPEC: 1.02 — SIGNIFICANT CHANGE UP (ref 1–1.03)
SPECIMEN SOURCE: SIGNIFICANT CHANGE UP
SPHEROCYTES BLD QL SMEAR: SLIGHT — SIGNIFICANT CHANGE UP
SPHEROCYTES BLD QL SMEAR: SLIGHT — SIGNIFICANT CHANGE UP
TARGETS BLD QL SMEAR: SIGNIFICANT CHANGE UP
TARGETS BLD QL SMEAR: SLIGHT — SIGNIFICANT CHANGE UP
THC UR QL: NEGATIVE — SIGNIFICANT CHANGE UP
TIBC SERPL-MCNC: 86 UG/DL — LOW (ref 220–430)
TIBC SERPL-MCNC: SIGNIFICANT CHANGE UP UG/DL (ref 220–430)
TOTAL NUCLEATED CELL COUNT, BODY FLUID: 47 /UL — SIGNIFICANT CHANGE UP
TRIGL SERPL-MCNC: 114 MG/DL — SIGNIFICANT CHANGE UP
TROPONIN I, HIGH SENSITIVITY RESULT: 15.6 NG/L — SIGNIFICANT CHANGE UP
TUBE TYPE: SIGNIFICANT CHANGE UP
UIBC SERPL-MCNC: < 16.8 UG/DL — SIGNIFICANT CHANGE UP (ref 110–370)
UIBC SERPL-MCNC: >-2 UG/DL — LOW (ref 110–370)
UROBILINOGEN FLD QL: 1 MG/DL — SIGNIFICANT CHANGE UP (ref 0.2–1)
UROBILINOGEN FLD QL: 2 E.U./DL
VANCOMYCIN FLD-MCNC: 12.6 UG/ML — SIGNIFICANT CHANGE UP
VANCOMYCIN FLD-MCNC: 14.9 UG/ML — SIGNIFICANT CHANGE UP
VARIANT LYMPHS # BLD: 0.9 % — SIGNIFICANT CHANGE UP (ref 0–6)
VIT B12 SERPL-MCNC: >2000 PG/ML — HIGH (ref 232–1245)
WBC # BLD: 11.3 K/UL — HIGH (ref 3.8–10.5)
WBC # BLD: 12.13 K/UL — HIGH (ref 3.8–10.5)
WBC # BLD: 12.66 K/UL — HIGH (ref 3.8–10.5)
WBC # BLD: 12.78 K/UL — HIGH (ref 3.8–10.5)
WBC # BLD: 13.39 K/UL — HIGH (ref 3.8–10.5)
WBC # BLD: 14.64 K/UL — HIGH (ref 3.8–10.5)
WBC # BLD: 15.9 K/UL — HIGH (ref 3.8–10.5)
WBC # BLD: 16.13 K/UL — HIGH (ref 3.8–10.5)
WBC # BLD: 17.09 K/UL — HIGH (ref 3.8–10.5)
WBC # BLD: 17.24 K/UL — HIGH (ref 3.8–10.5)
WBC # BLD: 22.29 K/UL — HIGH (ref 3.8–10.5)
WBC # BLD: 22.82 K/UL — HIGH (ref 3.8–10.5)
WBC # BLD: 23.04 K/UL — HIGH (ref 3.8–10.5)
WBC # BLD: 23.55 K/UL — HIGH (ref 3.8–10.5)
WBC # BLD: 23.72 K/UL — HIGH (ref 3.8–10.5)
WBC # BLD: 23.96 K/UL — HIGH (ref 3.8–10.5)
WBC # BLD: 24.5 K/UL — HIGH (ref 3.8–10.5)
WBC # BLD: 25.64 K/UL — HIGH (ref 3.8–10.5)
WBC # BLD: 25.7 K/UL — HIGH (ref 3.8–10.5)
WBC # BLD: 27.64 K/UL — HIGH (ref 3.8–10.5)
WBC # BLD: 28.11 K/UL — HIGH (ref 3.8–10.5)
WBC # BLD: 30.07 K/UL — HIGH (ref 3.8–10.5)
WBC # BLD: 8.76 K/UL — SIGNIFICANT CHANGE UP (ref 3.8–10.5)
WBC # BLD: 9.78 K/UL — SIGNIFICANT CHANGE UP (ref 3.8–10.5)
WBC # BLD: 9.86 K/UL — SIGNIFICANT CHANGE UP (ref 3.8–10.5)
WBC # FLD AUTO: 11.3 K/UL — HIGH (ref 3.8–10.5)
WBC # FLD AUTO: 12.13 K/UL — HIGH (ref 3.8–10.5)
WBC # FLD AUTO: 12.66 K/UL — HIGH (ref 3.8–10.5)
WBC # FLD AUTO: 12.78 K/UL — HIGH (ref 3.8–10.5)
WBC # FLD AUTO: 13.39 K/UL — HIGH (ref 3.8–10.5)
WBC # FLD AUTO: 14.64 K/UL — HIGH (ref 3.8–10.5)
WBC # FLD AUTO: 15.9 K/UL — HIGH (ref 3.8–10.5)
WBC # FLD AUTO: 16.13 K/UL — HIGH (ref 3.8–10.5)
WBC # FLD AUTO: 17.09 K/UL — HIGH (ref 3.8–10.5)
WBC # FLD AUTO: 17.24 K/UL — HIGH (ref 3.8–10.5)
WBC # FLD AUTO: 22.29 K/UL — HIGH (ref 3.8–10.5)
WBC # FLD AUTO: 22.82 K/UL — HIGH (ref 3.8–10.5)
WBC # FLD AUTO: 23.04 K/UL — HIGH (ref 3.8–10.5)
WBC # FLD AUTO: 23.55 K/UL — HIGH (ref 3.8–10.5)
WBC # FLD AUTO: 23.72 K/UL — HIGH (ref 3.8–10.5)
WBC # FLD AUTO: 23.96 K/UL — HIGH (ref 3.8–10.5)
WBC # FLD AUTO: 24.5 K/UL — HIGH (ref 3.8–10.5)
WBC # FLD AUTO: 25.64 K/UL — HIGH (ref 3.8–10.5)
WBC # FLD AUTO: 25.7 K/UL — HIGH (ref 3.8–10.5)
WBC # FLD AUTO: 27.64 K/UL — HIGH (ref 3.8–10.5)
WBC # FLD AUTO: 28.11 K/UL — HIGH (ref 3.8–10.5)
WBC # FLD AUTO: 30.07 K/UL — HIGH (ref 3.8–10.5)
WBC # FLD AUTO: 8.76 K/UL — SIGNIFICANT CHANGE UP (ref 3.8–10.5)
WBC # FLD AUTO: 9.78 K/UL — SIGNIFICANT CHANGE UP (ref 3.8–10.5)
WBC # FLD AUTO: 9.86 K/UL — SIGNIFICANT CHANGE UP (ref 3.8–10.5)
WBC UR QL: 21 /HPF — HIGH (ref 0–5)
WBC UR QL: > 10 /HPF

## 2023-01-01 PROCEDURE — 99291 CRITICAL CARE FIRST HOUR: CPT

## 2023-01-01 PROCEDURE — 36000 PLACE NEEDLE IN VEIN: CPT

## 2023-01-01 PROCEDURE — 90947 DIALYSIS REPEATED EVAL: CPT

## 2023-01-01 PROCEDURE — 88112 CYTOPATH CELL ENHANCE TECH: CPT

## 2023-01-01 PROCEDURE — 81001 URINALYSIS AUTO W/SCOPE: CPT

## 2023-01-01 PROCEDURE — 74018 RADEX ABDOMEN 1 VIEW: CPT | Mod: 26

## 2023-01-01 PROCEDURE — P9100: CPT

## 2023-01-01 PROCEDURE — 93971 EXTREMITY STUDY: CPT | Mod: 26,GC

## 2023-01-01 PROCEDURE — 83605 ASSAY OF LACTIC ACID: CPT

## 2023-01-01 PROCEDURE — 87070 CULTURE OTHR SPECIMN AEROBIC: CPT

## 2023-01-01 PROCEDURE — 71045 X-RAY EXAM CHEST 1 VIEW: CPT | Mod: 26

## 2023-01-01 PROCEDURE — 74176 CT ABD & PELVIS W/O CONTRAST: CPT | Mod: 26

## 2023-01-01 PROCEDURE — P9047: CPT

## 2023-01-01 PROCEDURE — 83930 ASSAY OF BLOOD OSMOLALITY: CPT

## 2023-01-01 PROCEDURE — 70496 CT ANGIOGRAPHY HEAD: CPT | Mod: 26

## 2023-01-01 PROCEDURE — 76705 ECHO EXAM OF ABDOMEN: CPT | Mod: 26,GC

## 2023-01-01 PROCEDURE — 36556 INSERT NON-TUNNEL CV CATH: CPT | Mod: GC

## 2023-01-01 PROCEDURE — 71045 X-RAY EXAM CHEST 1 VIEW: CPT

## 2023-01-01 PROCEDURE — 96374 THER/PROPH/DIAG INJ IV PUSH: CPT

## 2023-01-01 PROCEDURE — 74174 CTA ABD&PLVS W/CONTRAST: CPT

## 2023-01-01 PROCEDURE — 99222 1ST HOSP IP/OBS MODERATE 55: CPT

## 2023-01-01 PROCEDURE — 82248 BILIRUBIN DIRECT: CPT

## 2023-01-01 PROCEDURE — 89051 BODY FLUID CELL COUNT: CPT

## 2023-01-01 PROCEDURE — 82140 ASSAY OF AMMONIA: CPT

## 2023-01-01 PROCEDURE — P9045: CPT

## 2023-01-01 PROCEDURE — 87305 ASPERGILLUS AG IA: CPT

## 2023-01-01 PROCEDURE — 71045 X-RAY EXAM CHEST 1 VIEW: CPT | Mod: 26,77

## 2023-01-01 PROCEDURE — 82390 ASSAY OF CERULOPLASMIN: CPT

## 2023-01-01 PROCEDURE — 86709 HEPATITIS A IGM ANTIBODY: CPT

## 2023-01-01 PROCEDURE — 70498 CT ANGIOGRAPHY NECK: CPT | Mod: 26

## 2023-01-01 PROCEDURE — 80202 ASSAY OF VANCOMYCIN: CPT

## 2023-01-01 PROCEDURE — 86985 SPLIT BLOOD OR PRODUCTS: CPT

## 2023-01-01 PROCEDURE — 70450 CT HEAD/BRAIN W/O DYE: CPT

## 2023-01-01 PROCEDURE — 96375 TX/PRO/DX INJ NEW DRUG ADDON: CPT

## 2023-01-01 PROCEDURE — 76775 US EXAM ABDO BACK WALL LIM: CPT | Mod: 26,GC

## 2023-01-01 PROCEDURE — 36430 TRANSFUSION BLD/BLD COMPNT: CPT

## 2023-01-01 PROCEDURE — 99291 CRITICAL CARE FIRST HOUR: CPT | Mod: GC,25

## 2023-01-01 PROCEDURE — 88112 CYTOPATH CELL ENHANCE TECH: CPT | Mod: 26

## 2023-01-01 PROCEDURE — 88305 TISSUE EXAM BY PATHOLOGIST: CPT | Mod: 26

## 2023-01-01 PROCEDURE — 36415 COLL VENOUS BLD VENIPUNCTURE: CPT

## 2023-01-01 PROCEDURE — 82962 GLUCOSE BLOOD TEST: CPT

## 2023-01-01 PROCEDURE — 84145 PROCALCITONIN (PCT): CPT

## 2023-01-01 PROCEDURE — 99233 SBSQ HOSP IP/OBS HIGH 50: CPT

## 2023-01-01 PROCEDURE — 85385 FIBRINOGEN ANTIGEN: CPT

## 2023-01-01 PROCEDURE — 84132 ASSAY OF SERUM POTASSIUM: CPT

## 2023-01-01 PROCEDURE — 99498 ADVNCD CARE PLAN ADDL 30 MIN: CPT | Mod: 25

## 2023-01-01 PROCEDURE — 72125 CT NECK SPINE W/O DYE: CPT

## 2023-01-01 PROCEDURE — 80074 ACUTE HEPATITIS PANEL: CPT

## 2023-01-01 PROCEDURE — 99497 ADVNCD CARE PLAN 30 MIN: CPT | Mod: 25

## 2023-01-01 PROCEDURE — 82607 VITAMIN B-12: CPT

## 2023-01-01 PROCEDURE — 90945 DIALYSIS ONE EVALUATION: CPT

## 2023-01-01 PROCEDURE — 88305 TISSUE EXAM BY PATHOLOGIST: CPT

## 2023-01-01 PROCEDURE — 87086 URINE CULTURE/COLONY COUNT: CPT

## 2023-01-01 PROCEDURE — 36010 PLACE CATHETER IN VEIN: CPT

## 2023-01-01 PROCEDURE — 83690 ASSAY OF LIPASE: CPT

## 2023-01-01 PROCEDURE — 93308 TTE F-UP OR LMTD: CPT | Mod: 26,GC

## 2023-01-01 PROCEDURE — 99232 SBSQ HOSP IP/OBS MODERATE 35: CPT | Mod: GC

## 2023-01-01 PROCEDURE — 99221 1ST HOSP IP/OBS SF/LOW 40: CPT

## 2023-01-01 PROCEDURE — 83735 ASSAY OF MAGNESIUM: CPT

## 2023-01-01 PROCEDURE — 86901 BLOOD TYPING SEROLOGIC RH(D): CPT

## 2023-01-01 PROCEDURE — 31500 INSERT EMERGENCY AIRWAY: CPT | Mod: GC

## 2023-01-01 PROCEDURE — 86706 HEP B SURFACE ANTIBODY: CPT

## 2023-01-01 PROCEDURE — 76604 US EXAM CHEST: CPT | Mod: 26,GC

## 2023-01-01 PROCEDURE — 82103 ALPHA-1-ANTITRYPSIN TOTAL: CPT

## 2023-01-01 PROCEDURE — 85730 THROMBOPLASTIN TIME PARTIAL: CPT

## 2023-01-01 PROCEDURE — 95708 EEG WO VID EA 12-26HR UNMNTR: CPT

## 2023-01-01 PROCEDURE — 86850 RBC ANTIBODY SCREEN: CPT

## 2023-01-01 PROCEDURE — 82803 BLOOD GASES ANY COMBINATION: CPT

## 2023-01-01 PROCEDURE — P9037: CPT

## 2023-01-01 PROCEDURE — 99223 1ST HOSP IP/OBS HIGH 75: CPT

## 2023-01-01 PROCEDURE — 87102 FUNGUS ISOLATION CULTURE: CPT

## 2023-01-01 PROCEDURE — 95720 EEG PHY/QHP EA INCR W/VEEG: CPT

## 2023-01-01 PROCEDURE — 83540 ASSAY OF IRON: CPT

## 2023-01-01 PROCEDURE — 82728 ASSAY OF FERRITIN: CPT

## 2023-01-01 PROCEDURE — 85384 FIBRINOGEN ACTIVITY: CPT

## 2023-01-01 PROCEDURE — 74174 CTA ABD&PLVS W/CONTRAST: CPT | Mod: 26

## 2023-01-01 PROCEDURE — 87252 VIRUS INOCULATION TISSUE: CPT

## 2023-01-01 PROCEDURE — 83615 LACTATE (LD) (LDH) ENZYME: CPT

## 2023-01-01 PROCEDURE — 93975 VASCULAR STUDY: CPT

## 2023-01-01 PROCEDURE — 71275 CT ANGIOGRAPHY CHEST: CPT

## 2023-01-01 PROCEDURE — 93975 VASCULAR STUDY: CPT | Mod: 26

## 2023-01-01 PROCEDURE — 87635 SARS-COV-2 COVID-19 AMP PRB: CPT

## 2023-01-01 PROCEDURE — 86038 ANTINUCLEAR ANTIBODIES: CPT

## 2023-01-01 PROCEDURE — 0042T: CPT

## 2023-01-01 PROCEDURE — 88312 SPECIAL STAINS GROUP 1: CPT

## 2023-01-01 PROCEDURE — 76937 US GUIDE VASCULAR ACCESS: CPT | Mod: 26

## 2023-01-01 PROCEDURE — 95700 EEG CONT REC W/VID EEG TECH: CPT

## 2023-01-01 PROCEDURE — P9011: CPT

## 2023-01-01 PROCEDURE — 70450 CT HEAD/BRAIN W/O DYE: CPT | Mod: 26

## 2023-01-01 PROCEDURE — 85610 PROTHROMBIN TIME: CPT

## 2023-01-01 PROCEDURE — 80048 BASIC METABOLIC PNL TOTAL CA: CPT

## 2023-01-01 PROCEDURE — 99233 SBSQ HOSP IP/OBS HIGH 50: CPT | Mod: GC

## 2023-01-01 PROCEDURE — 71275 CT ANGIOGRAPHY CHEST: CPT | Mod: 26

## 2023-01-01 PROCEDURE — 83010 ASSAY OF HAPTOGLOBIN QUANT: CPT

## 2023-01-01 PROCEDURE — 84295 ASSAY OF SERUM SODIUM: CPT

## 2023-01-01 PROCEDURE — 99291 CRITICAL CARE FIRST HOUR: CPT | Mod: 25

## 2023-01-01 PROCEDURE — 83550 IRON BINDING TEST: CPT

## 2023-01-01 PROCEDURE — 86923 COMPATIBILITY TEST ELECTRIC: CPT

## 2023-01-01 PROCEDURE — 74176 CT ABD & PELVIS W/O CONTRAST: CPT

## 2023-01-01 PROCEDURE — 72125 CT NECK SPINE W/O DYE: CPT | Mod: 26

## 2023-01-01 PROCEDURE — 94003 VENT MGMT INPAT SUBQ DAY: CPT

## 2023-01-01 PROCEDURE — P9016: CPT

## 2023-01-01 PROCEDURE — 80307 DRUG TEST PRSMV CHEM ANLYZR: CPT

## 2023-01-01 PROCEDURE — 87389 HIV-1 AG W/HIV-1&-2 AB AG IA: CPT

## 2023-01-01 PROCEDURE — 86965 POOLING BLOOD PLATELETS: CPT

## 2023-01-01 PROCEDURE — 84478 ASSAY OF TRIGLYCERIDES: CPT

## 2023-01-01 PROCEDURE — 88312 SPECIAL STAINS GROUP 1: CPT | Mod: 26

## 2023-01-01 PROCEDURE — 82550 ASSAY OF CK (CPK): CPT

## 2023-01-01 PROCEDURE — 86381 MITOCHONDRIAL ANTIBODY EACH: CPT

## 2023-01-01 PROCEDURE — P9059: CPT

## 2023-01-01 PROCEDURE — 85027 COMPLETE CBC AUTOMATED: CPT

## 2023-01-01 PROCEDURE — 86900 BLOOD TYPING SEROLOGIC ABO: CPT

## 2023-01-01 PROCEDURE — 87507 IADNA-DNA/RNA PROBE TQ 12-25: CPT

## 2023-01-01 PROCEDURE — 84484 ASSAY OF TROPONIN QUANT: CPT

## 2023-01-01 PROCEDURE — 84100 ASSAY OF PHOSPHORUS: CPT

## 2023-01-01 PROCEDURE — 84300 ASSAY OF URINE SODIUM: CPT

## 2023-01-01 PROCEDURE — 94002 VENT MGMT INPAT INIT DAY: CPT

## 2023-01-01 PROCEDURE — 86255 FLUORESCENT ANTIBODY SCREEN: CPT

## 2023-01-01 PROCEDURE — 82247 BILIRUBIN TOTAL: CPT

## 2023-01-01 PROCEDURE — 82746 ASSAY OF FOLIC ACID SERUM: CPT

## 2023-01-01 PROCEDURE — 85025 COMPLETE CBC W/AUTO DIFF WBC: CPT

## 2023-01-01 PROCEDURE — P9012: CPT

## 2023-01-01 PROCEDURE — 70450 CT HEAD/BRAIN W/O DYE: CPT | Mod: 26,59

## 2023-01-01 PROCEDURE — 74018 RADEX ABDOMEN 1 VIEW: CPT

## 2023-01-01 PROCEDURE — 87186 SC STD MICRODIL/AGAR DIL: CPT

## 2023-01-01 PROCEDURE — 87449 NOS EACH ORGANISM AG IA: CPT

## 2023-01-01 PROCEDURE — 83935 ASSAY OF URINE OSMOLALITY: CPT

## 2023-01-01 PROCEDURE — 87040 BLOOD CULTURE FOR BACTERIA: CPT

## 2023-01-01 PROCEDURE — 82330 ASSAY OF CALCIUM: CPT

## 2023-01-01 PROCEDURE — 70498 CT ANGIOGRAPHY NECK: CPT

## 2023-01-01 PROCEDURE — 31624 DX BRONCHOSCOPE/LAVAGE: CPT | Mod: GC

## 2023-01-01 PROCEDURE — 80053 COMPREHEN METABOLIC PANEL: CPT

## 2023-01-01 PROCEDURE — 70496 CT ANGIOGRAPHY HEAD: CPT

## 2023-01-01 PROCEDURE — 83880 ASSAY OF NATRIURETIC PEPTIDE: CPT

## 2023-01-01 RX ORDER — OCTREOTIDE ACETATE 200 UG/ML
50 INJECTION, SOLUTION INTRAVENOUS; SUBCUTANEOUS
Qty: 500 | Refills: 0 | Status: DISCONTINUED | OUTPATIENT
Start: 2023-01-01 | End: 2023-01-01

## 2023-01-01 RX ORDER — PANTOPRAZOLE SODIUM 20 MG/1
40 TABLET, DELAYED RELEASE ORAL EVERY 12 HOURS
Refills: 0 | Status: DISCONTINUED | OUTPATIENT
Start: 2023-01-01 | End: 2023-01-01

## 2023-01-01 RX ORDER — SODIUM CHLORIDE 9 MG/ML
10 INJECTION INTRAMUSCULAR; INTRAVENOUS; SUBCUTANEOUS
Refills: 0 | Status: DISCONTINUED | OUTPATIENT
Start: 2023-01-01 | End: 2023-01-01

## 2023-01-01 RX ORDER — LACTULOSE 10 G/15ML
30 SOLUTION ORAL EVERY 4 HOURS
Refills: 0 | Status: DISCONTINUED | OUTPATIENT
Start: 2023-01-01 | End: 2023-01-01

## 2023-01-01 RX ORDER — LACTULOSE 10 G/15ML
30 SOLUTION ORAL EVERY 6 HOURS
Refills: 0 | Status: DISCONTINUED | OUTPATIENT
Start: 2023-01-01 | End: 2023-01-01

## 2023-01-01 RX ORDER — POTASSIUM CHLORIDE 20 MEQ
10 PACKET (EA) ORAL ONCE
Refills: 0 | Status: COMPLETED | OUTPATIENT
Start: 2023-01-01 | End: 2023-01-01

## 2023-01-01 RX ORDER — PIPERACILLIN AND TAZOBACTAM 4; .5 G/20ML; G/20ML
4.5 INJECTION, POWDER, LYOPHILIZED, FOR SOLUTION INTRAVENOUS ONCE
Refills: 0 | Status: COMPLETED | OUTPATIENT
Start: 2023-01-01 | End: 2023-01-01

## 2023-01-01 RX ORDER — SODIUM CHLORIDE 5 G/100ML
500 INJECTION, SOLUTION INTRAVENOUS
Refills: 0 | Status: DISCONTINUED | OUTPATIENT
Start: 2023-01-01 | End: 2023-01-01

## 2023-01-01 RX ORDER — FENTANYL CITRATE 50 UG/ML
0.5 INJECTION INTRAVENOUS
Qty: 2500 | Refills: 0 | Status: DISCONTINUED | OUTPATIENT
Start: 2023-01-01 | End: 2023-01-01

## 2023-01-01 RX ORDER — POTASSIUM CHLORIDE 20 MEQ
10 PACKET (EA) ORAL
Refills: 0 | Status: COMPLETED | OUTPATIENT
Start: 2023-01-01 | End: 2023-01-01

## 2023-01-01 RX ORDER — FENTANYL CITRATE 50 UG/ML
25 INJECTION INTRAVENOUS ONCE
Refills: 0 | Status: DISCONTINUED | OUTPATIENT
Start: 2023-01-01 | End: 2023-01-01

## 2023-01-01 RX ORDER — LACTULOSE 10 G/15ML
200 SOLUTION ORAL EVERY 24 HOURS
Refills: 0 | Status: DISCONTINUED | OUTPATIENT
Start: 2023-01-01 | End: 2023-01-01

## 2023-01-01 RX ORDER — PROPOFOL 10 MG/ML
10 INJECTION, EMULSION INTRAVENOUS
Qty: 500 | Refills: 0 | Status: DISCONTINUED | OUTPATIENT
Start: 2023-01-01 | End: 2023-01-01

## 2023-01-01 RX ORDER — VASOPRESSIN 20 [USP'U]/ML
0.01 INJECTION INTRAVENOUS
Qty: 40 | Refills: 0 | Status: DISCONTINUED | OUTPATIENT
Start: 2023-01-01 | End: 2023-01-01

## 2023-01-01 RX ORDER — ALBUMIN HUMAN 25 %
250 VIAL (ML) INTRAVENOUS ONCE
Refills: 0 | Status: COMPLETED | OUTPATIENT
Start: 2023-01-01 | End: 2023-01-01

## 2023-01-01 RX ORDER — CEFTRIAXONE 500 MG/1
1000 INJECTION, POWDER, FOR SOLUTION INTRAMUSCULAR; INTRAVENOUS EVERY 24 HOURS
Refills: 0 | Status: COMPLETED | OUTPATIENT
Start: 2023-01-01 | End: 2023-01-01

## 2023-01-01 RX ORDER — VANCOMYCIN HCL 1 G
1500 VIAL (EA) INTRAVENOUS ONCE
Refills: 0 | Status: COMPLETED | OUTPATIENT
Start: 2023-01-01 | End: 2023-01-01

## 2023-01-01 RX ORDER — LACTULOSE 10 G/15ML
30 SOLUTION ORAL EVERY 4 HOURS
Refills: 0 | Status: COMPLETED | OUTPATIENT
Start: 2023-01-01 | End: 2023-01-01

## 2023-01-01 RX ORDER — SODIUM CHLORIDE 9 MG/ML
2000 INJECTION INTRAMUSCULAR; INTRAVENOUS; SUBCUTANEOUS ONCE
Refills: 0 | Status: COMPLETED | OUTPATIENT
Start: 2023-01-01 | End: 2023-01-01

## 2023-01-01 RX ORDER — POTASSIUM CHLORIDE 20 MEQ
10 PACKET (EA) ORAL ONCE
Refills: 0 | Status: DISCONTINUED | OUTPATIENT
Start: 2023-01-01 | End: 2023-01-01

## 2023-01-01 RX ORDER — CEFAZOLIN SODIUM 1 G
2000 VIAL (EA) INJECTION EVERY 12 HOURS
Refills: 0 | Status: DISCONTINUED | OUTPATIENT
Start: 2023-01-01 | End: 2023-01-01

## 2023-01-01 RX ORDER — OCTREOTIDE ACETATE 200 UG/ML
50 INJECTION, SOLUTION INTRAVENOUS; SUBCUTANEOUS ONCE
Refills: 0 | Status: COMPLETED | OUTPATIENT
Start: 2023-01-01 | End: 2023-01-01

## 2023-01-01 RX ORDER — FENTANYL CITRATE 50 UG/ML
150 INJECTION INTRAVENOUS ONCE
Refills: 0 | Status: DISCONTINUED | OUTPATIENT
Start: 2023-01-01 | End: 2023-01-01

## 2023-01-01 RX ORDER — HYDROMORPHONE HYDROCHLORIDE 2 MG/ML
2 INJECTION INTRAMUSCULAR; INTRAVENOUS; SUBCUTANEOUS
Refills: 0 | Status: DISCONTINUED | OUTPATIENT
Start: 2023-01-01 | End: 2023-01-01

## 2023-01-01 RX ORDER — FENTANYL CITRATE 50 UG/ML
0.5 INJECTION INTRAVENOUS
Qty: 5000 | Refills: 0 | Status: DISCONTINUED | OUTPATIENT
Start: 2023-01-01 | End: 2023-01-01

## 2023-01-01 RX ORDER — PROPOFOL 10 MG/ML
50 INJECTION, EMULSION INTRAVENOUS ONCE
Refills: 0 | Status: DISCONTINUED | OUTPATIENT
Start: 2023-01-01 | End: 2023-01-01

## 2023-01-01 RX ORDER — OXYMETAZOLINE HYDROCHLORIDE 0.5 MG/ML
1 SPRAY NASAL
Refills: 0 | Status: DISCONTINUED | OUTPATIENT
Start: 2023-01-01 | End: 2023-01-01

## 2023-01-01 RX ORDER — NAFCILLIN 10 G/100ML
2 INJECTION, POWDER, FOR SOLUTION INTRAVENOUS EVERY 4 HOURS
Refills: 0 | Status: DISCONTINUED | OUTPATIENT
Start: 2023-01-01 | End: 2023-01-01

## 2023-01-01 RX ORDER — NOREPINEPHRINE BITARTRATE/D5W 8 MG/250ML
0.05 PLASTIC BAG, INJECTION (ML) INTRAVENOUS
Qty: 8 | Refills: 0 | Status: DISCONTINUED | OUTPATIENT
Start: 2023-01-01 | End: 2023-01-01

## 2023-01-01 RX ORDER — LACTULOSE 10 G/15ML
30 SOLUTION ORAL EVERY 8 HOURS
Refills: 0 | Status: DISCONTINUED | OUTPATIENT
Start: 2023-01-01 | End: 2023-01-01

## 2023-01-01 RX ORDER — NAFCILLIN 10 G/100ML
2 INJECTION, POWDER, FOR SOLUTION INTRAVENOUS ONCE
Refills: 0 | Status: COMPLETED | OUTPATIENT
Start: 2023-01-01 | End: 2023-01-01

## 2023-01-01 RX ORDER — ROBINUL 0.2 MG/ML
0.4 INJECTION INTRAMUSCULAR; INTRAVENOUS
Refills: 0 | Status: DISCONTINUED | OUTPATIENT
Start: 2023-01-01 | End: 2023-01-01

## 2023-01-01 RX ORDER — PROPOFOL 10 MG/ML
10 INJECTION, EMULSION INTRAVENOUS
Qty: 1000 | Refills: 0 | Status: DISCONTINUED | OUTPATIENT
Start: 2023-01-01 | End: 2023-01-01

## 2023-01-01 RX ORDER — MIDAZOLAM HYDROCHLORIDE 1 MG/ML
4 INJECTION, SOLUTION INTRAMUSCULAR; INTRAVENOUS ONCE
Refills: 0 | Status: DISCONTINUED | OUTPATIENT
Start: 2023-01-01 | End: 2023-01-01

## 2023-01-01 RX ORDER — NAFCILLIN 10 G/100ML
INJECTION, POWDER, FOR SOLUTION INTRAVENOUS
Refills: 0 | Status: DISCONTINUED | OUTPATIENT
Start: 2023-01-01 | End: 2023-01-01

## 2023-01-01 RX ORDER — CHLORHEXIDINE GLUCONATE 213 G/1000ML
15 SOLUTION TOPICAL EVERY 12 HOURS
Refills: 0 | Status: DISCONTINUED | OUTPATIENT
Start: 2023-01-01 | End: 2023-01-01

## 2023-01-01 RX ORDER — PREDNISOLONE 5 MG
40 TABLET ORAL DAILY
Refills: 0 | Status: DISCONTINUED | OUTPATIENT
Start: 2023-01-01 | End: 2023-01-01

## 2023-01-01 RX ORDER — ALBUMIN HUMAN 25 %
100 VIAL (ML) INTRAVENOUS EVERY 6 HOURS
Refills: 0 | Status: COMPLETED | OUTPATIENT
Start: 2023-01-01 | End: 2023-01-01

## 2023-01-01 RX ORDER — MIDAZOLAM HYDROCHLORIDE 1 MG/ML
6 INJECTION, SOLUTION INTRAMUSCULAR; INTRAVENOUS ONCE
Refills: 0 | Status: DISCONTINUED | OUTPATIENT
Start: 2023-01-01 | End: 2023-01-01

## 2023-01-01 RX ORDER — DEXMEDETOMIDINE HYDROCHLORIDE IN 0.9% SODIUM CHLORIDE 4 UG/ML
0.2 INJECTION INTRAVENOUS
Qty: 400 | Refills: 0 | Status: DISCONTINUED | OUTPATIENT
Start: 2023-01-01 | End: 2023-01-01

## 2023-01-01 RX ORDER — DEXMEDETOMIDINE HYDROCHLORIDE IN 0.9% SODIUM CHLORIDE 4 UG/ML
0.2 INJECTION INTRAVENOUS
Qty: 200 | Refills: 0 | Status: DISCONTINUED | OUTPATIENT
Start: 2023-01-01 | End: 2023-01-01

## 2023-01-01 RX ORDER — CEFTRIAXONE 500 MG/1
1000 INJECTION, POWDER, FOR SOLUTION INTRAMUSCULAR; INTRAVENOUS ONCE
Refills: 0 | Status: COMPLETED | OUTPATIENT
Start: 2023-01-01 | End: 2023-01-01

## 2023-01-01 RX ORDER — MEROPENEM 1 G/30ML
1000 INJECTION INTRAVENOUS EVERY 8 HOURS
Refills: 0 | Status: DISCONTINUED | OUTPATIENT
Start: 2023-01-01 | End: 2023-01-01

## 2023-01-01 RX ORDER — PIPERACILLIN AND TAZOBACTAM 4; .5 G/20ML; G/20ML
4.5 INJECTION, POWDER, LYOPHILIZED, FOR SOLUTION INTRAVENOUS EVERY 12 HOURS
Refills: 0 | Status: DISCONTINUED | OUTPATIENT
Start: 2023-01-01 | End: 2023-01-01

## 2023-01-01 RX ORDER — CHLORHEXIDINE GLUCONATE 213 G/1000ML
1 SOLUTION TOPICAL
Refills: 0 | Status: DISCONTINUED | OUTPATIENT
Start: 2023-01-01 | End: 2023-01-01

## 2023-01-01 RX ORDER — MIDAZOLAM HYDROCHLORIDE 1 MG/ML
2 INJECTION, SOLUTION INTRAMUSCULAR; INTRAVENOUS ONCE
Refills: 0 | Status: DISCONTINUED | OUTPATIENT
Start: 2023-01-01 | End: 2023-01-01

## 2023-01-01 RX ORDER — ACETAMINOPHEN 500 MG
650 TABLET ORAL EVERY 6 HOURS
Refills: 0 | Status: DISCONTINUED | OUTPATIENT
Start: 2023-01-01 | End: 2023-01-01

## 2023-01-01 RX ORDER — TRANEXAMIC ACID 100 MG/ML
1 INJECTION, SOLUTION INTRAVENOUS ONCE
Refills: 0 | Status: DISCONTINUED | OUTPATIENT
Start: 2023-01-01 | End: 2023-01-01

## 2023-01-01 RX ORDER — CEFTRIAXONE 500 MG/1
2000 INJECTION, POWDER, FOR SOLUTION INTRAMUSCULAR; INTRAVENOUS EVERY 24 HOURS
Refills: 0 | Status: DISCONTINUED | OUTPATIENT
Start: 2023-01-01 | End: 2023-01-01

## 2023-01-01 RX ORDER — NOREPINEPHRINE BITARTRATE/D5W 8 MG/250ML
0.05 PLASTIC BAG, INJECTION (ML) INTRAVENOUS
Qty: 32 | Refills: 0 | Status: DISCONTINUED | OUTPATIENT
Start: 2023-01-01 | End: 2023-01-01

## 2023-01-01 RX ORDER — VASOPRESSIN 20 [USP'U]/ML
0.04 INJECTION INTRAVENOUS
Qty: 40 | Refills: 0 | Status: DISCONTINUED | OUTPATIENT
Start: 2023-01-01 | End: 2023-01-01

## 2023-01-01 RX ORDER — PHYTONADIONE (VIT K1) 5 MG
10 TABLET ORAL ONCE
Refills: 0 | Status: COMPLETED | OUTPATIENT
Start: 2023-01-01 | End: 2023-01-01

## 2023-01-01 RX ORDER — PREGABALIN 225 MG/1
1000 CAPSULE ORAL EVERY 24 HOURS
Refills: 0 | Status: ACTIVE | OUTPATIENT
Start: 2023-01-01 | End: 2023-01-01

## 2023-01-01 RX ORDER — SODIUM CHLORIDE 9 MG/ML
250 INJECTION, SOLUTION INTRAVENOUS ONCE
Refills: 0 | Status: COMPLETED | OUTPATIENT
Start: 2023-01-01 | End: 2023-01-01

## 2023-01-01 RX ORDER — DESMOPRESSIN ACETATE 0.1 MG/1
1 TABLET ORAL ONCE
Refills: 0 | Status: COMPLETED | OUTPATIENT
Start: 2023-01-01 | End: 2023-01-01

## 2023-01-01 RX ORDER — LACTULOSE 10 G/15ML
20 SOLUTION ORAL ONCE
Refills: 0 | Status: COMPLETED | OUTPATIENT
Start: 2023-01-01 | End: 2023-01-01

## 2023-01-01 RX ORDER — LEVETIRACETAM 250 MG/1
500 TABLET, FILM COATED ORAL EVERY 12 HOURS
Refills: 0 | Status: DISCONTINUED | OUTPATIENT
Start: 2023-01-01 | End: 2023-01-01

## 2023-01-01 RX ORDER — HEPARIN SODIUM 5000 [USP'U]/ML
5000 INJECTION INTRAVENOUS; SUBCUTANEOUS EVERY 8 HOURS
Refills: 0 | Status: DISCONTINUED | OUTPATIENT
Start: 2023-01-01 | End: 2023-01-01

## 2023-01-01 RX ORDER — POTASSIUM CHLORIDE 20 MEQ
40 PACKET (EA) ORAL ONCE
Refills: 0 | Status: COMPLETED | OUTPATIENT
Start: 2023-01-01 | End: 2023-01-01

## 2023-01-01 RX ORDER — ALBUMIN HUMAN 25 %
100 VIAL (ML) INTRAVENOUS EVERY 6 HOURS
Refills: 0 | Status: DISCONTINUED | OUTPATIENT
Start: 2023-01-01 | End: 2023-01-01

## 2023-01-01 RX ORDER — CEFTRIAXONE 500 MG/1
1000 INJECTION, POWDER, FOR SOLUTION INTRAMUSCULAR; INTRAVENOUS EVERY 24 HOURS
Refills: 0 | Status: DISCONTINUED | OUTPATIENT
Start: 2023-01-01 | End: 2023-01-01

## 2023-01-01 RX ORDER — PIPERACILLIN AND TAZOBACTAM 4; .5 G/20ML; G/20ML
4.5 INJECTION, POWDER, LYOPHILIZED, FOR SOLUTION INTRAVENOUS EVERY 8 HOURS
Refills: 0 | Status: DISCONTINUED | OUTPATIENT
Start: 2023-01-01 | End: 2023-01-01

## 2023-01-01 RX ORDER — VASOPRESSIN 20 [USP'U]/ML
0.02 INJECTION INTRAVENOUS
Qty: 40 | Refills: 0 | Status: DISCONTINUED | OUTPATIENT
Start: 2023-01-01 | End: 2023-01-01

## 2023-01-01 RX ORDER — POTASSIUM PHOSPHATE, MONOBASIC POTASSIUM PHOSPHATE, DIBASIC 236; 224 MG/ML; MG/ML
15 INJECTION, SOLUTION INTRAVENOUS ONCE
Refills: 0 | Status: COMPLETED | OUTPATIENT
Start: 2023-01-01 | End: 2023-01-01

## 2023-01-01 RX ORDER — HYDROMORPHONE HYDROCHLORIDE 2 MG/ML
1 INJECTION INTRAMUSCULAR; INTRAVENOUS; SUBCUTANEOUS
Refills: 0 | Status: DISCONTINUED | OUTPATIENT
Start: 2023-01-01 | End: 2023-01-01

## 2023-01-01 RX ORDER — LEVETIRACETAM 250 MG/1
1000 TABLET, FILM COATED ORAL ONCE
Refills: 0 | Status: COMPLETED | OUTPATIENT
Start: 2023-01-01 | End: 2023-01-01

## 2023-01-01 RX ORDER — MUPIROCIN 20 MG/G
1 OINTMENT TOPICAL
Refills: 0 | Status: DISCONTINUED | OUTPATIENT
Start: 2023-01-01 | End: 2023-01-01

## 2023-01-01 RX ORDER — FENTANYL CITRATE 50 UG/ML
100 INJECTION INTRAVENOUS ONCE
Refills: 0 | Status: DISCONTINUED | OUTPATIENT
Start: 2023-01-01 | End: 2023-01-01

## 2023-01-01 RX ORDER — LACTULOSE 10 G/15ML
60 SOLUTION ORAL EVERY 4 HOURS
Refills: 0 | Status: DISCONTINUED | OUTPATIENT
Start: 2023-01-01 | End: 2023-01-01

## 2023-01-01 RX ORDER — PIPERACILLIN AND TAZOBACTAM 4; .5 G/20ML; G/20ML
4.5 INJECTION, POWDER, LYOPHILIZED, FOR SOLUTION INTRAVENOUS ONCE
Refills: 0 | Status: DISCONTINUED | OUTPATIENT
Start: 2023-01-01 | End: 2023-01-01

## 2023-01-01 RX ORDER — SODIUM CHLORIDE 9 MG/ML
1000 INJECTION INTRAMUSCULAR; INTRAVENOUS; SUBCUTANEOUS ONCE
Refills: 0 | Status: COMPLETED | OUTPATIENT
Start: 2023-01-01 | End: 2023-01-01

## 2023-01-01 RX ORDER — DEXTROSE 50 % IN WATER 50 %
50 SYRINGE (ML) INTRAVENOUS ONCE
Refills: 0 | Status: COMPLETED | OUTPATIENT
Start: 2023-01-01 | End: 2023-01-01

## 2023-01-01 RX ORDER — MEROPENEM 1 G/30ML
500 INJECTION INTRAVENOUS EVERY 12 HOURS
Refills: 0 | Status: DISCONTINUED | OUTPATIENT
Start: 2023-01-01 | End: 2023-01-01

## 2023-01-01 RX ORDER — PROPOFOL 10 MG/ML
15 INJECTION, EMULSION INTRAVENOUS ONCE
Refills: 0 | Status: DISCONTINUED | OUTPATIENT
Start: 2023-01-01 | End: 2023-01-01

## 2023-01-01 RX ORDER — FOLIC ACID 0.8 MG
1 TABLET ORAL DAILY
Refills: 0 | Status: DISCONTINUED | OUTPATIENT
Start: 2023-01-01 | End: 2023-01-01

## 2023-01-01 RX ORDER — THIAMINE MONONITRATE (VIT B1) 100 MG
500 TABLET ORAL DAILY
Refills: 0 | Status: COMPLETED | OUTPATIENT
Start: 2023-01-01 | End: 2023-01-01

## 2023-01-01 RX ADMIN — PIPERACILLIN AND TAZOBACTAM 200 GRAM(S): 4; .5 INJECTION, POWDER, LYOPHILIZED, FOR SOLUTION INTRAVENOUS at 12:36

## 2023-01-01 RX ADMIN — MUPIROCIN 1 APPLICATION(S): 20 OINTMENT TOPICAL at 05:15

## 2023-01-01 RX ADMIN — FENTANYL CITRATE 25 MICROGRAM(S): 50 INJECTION INTRAVENOUS at 20:22

## 2023-01-01 RX ADMIN — FENTANYL CITRATE 25 MICROGRAM(S): 50 INJECTION INTRAVENOUS at 14:00

## 2023-01-01 RX ADMIN — PIPERACILLIN AND TAZOBACTAM 25 GRAM(S): 4; .5 INJECTION, POWDER, LYOPHILIZED, FOR SOLUTION INTRAVENOUS at 14:02

## 2023-01-01 RX ADMIN — HEPARIN SODIUM 5000 UNIT(S): 5000 INJECTION INTRAVENOUS; SUBCUTANEOUS at 22:00

## 2023-01-01 RX ADMIN — PROPOFOL 5.28 MICROGRAM(S)/KG/MIN: 10 INJECTION, EMULSION INTRAVENOUS at 11:09

## 2023-01-01 RX ADMIN — PREGABALIN 1000 MICROGRAM(S): 225 CAPSULE ORAL at 13:26

## 2023-01-01 RX ADMIN — PANTOPRAZOLE SODIUM 40 MILLIGRAM(S): 20 TABLET, DELAYED RELEASE ORAL at 09:20

## 2023-01-01 RX ADMIN — Medication 8.25 MICROGRAM(S)/KG/MIN: at 07:14

## 2023-01-01 RX ADMIN — Medication 125 MILLILITER(S): at 15:58

## 2023-01-01 RX ADMIN — CEFTRIAXONE 100 MILLIGRAM(S): 500 INJECTION, POWDER, FOR SOLUTION INTRAMUSCULAR; INTRAVENOUS at 05:39

## 2023-01-01 RX ADMIN — PIPERACILLIN AND TAZOBACTAM 25 GRAM(S): 4; .5 INJECTION, POWDER, LYOPHILIZED, FOR SOLUTION INTRAVENOUS at 03:25

## 2023-01-01 RX ADMIN — OCTREOTIDE ACETATE 10 MICROGRAM(S)/HR: 200 INJECTION, SOLUTION INTRAVENOUS; SUBCUTANEOUS at 00:08

## 2023-01-01 RX ADMIN — LACTULOSE 30 GRAM(S): 10 SOLUTION ORAL at 15:08

## 2023-01-01 RX ADMIN — SODIUM CHLORIDE 25 MILLILITER(S): 5 INJECTION, SOLUTION INTRAVENOUS at 19:32

## 2023-01-01 RX ADMIN — LACTULOSE 60 GRAM(S): 10 SOLUTION ORAL at 05:04

## 2023-01-01 RX ADMIN — PREGABALIN 1000 MICROGRAM(S): 225 CAPSULE ORAL at 18:06

## 2023-01-01 RX ADMIN — Medication 25 MILLILITER(S): at 21:35

## 2023-01-01 RX ADMIN — PANTOPRAZOLE SODIUM 40 MILLIGRAM(S): 20 TABLET, DELAYED RELEASE ORAL at 21:50

## 2023-01-01 RX ADMIN — Medication 300 MILLIGRAM(S): at 12:53

## 2023-01-01 RX ADMIN — CHLORHEXIDINE GLUCONATE 1 APPLICATION(S): 213 SOLUTION TOPICAL at 05:47

## 2023-01-01 RX ADMIN — FENTANYL CITRATE 25 MICROGRAM(S): 50 INJECTION INTRAVENOUS at 04:01

## 2023-01-01 RX ADMIN — PANTOPRAZOLE SODIUM 40 MILLIGRAM(S): 20 TABLET, DELAYED RELEASE ORAL at 21:05

## 2023-01-01 RX ADMIN — PROPOFOL 5.28 MICROGRAM(S)/KG/MIN: 10 INJECTION, EMULSION INTRAVENOUS at 18:29

## 2023-01-01 RX ADMIN — HEPARIN SODIUM 5000 UNIT(S): 5000 INJECTION INTRAVENOUS; SUBCUTANEOUS at 05:47

## 2023-01-01 RX ADMIN — OCTREOTIDE ACETATE 10 MICROGRAM(S)/HR: 200 INJECTION, SOLUTION INTRAVENOUS; SUBCUTANEOUS at 06:06

## 2023-01-01 RX ADMIN — SODIUM CHLORIDE 250 MILLILITER(S): 9 INJECTION, SOLUTION INTRAVENOUS at 04:23

## 2023-01-01 RX ADMIN — MEROPENEM 100 MILLIGRAM(S): 1 INJECTION INTRAVENOUS at 07:44

## 2023-01-01 RX ADMIN — HEPARIN SODIUM 5000 UNIT(S): 5000 INJECTION INTRAVENOUS; SUBCUTANEOUS at 05:21

## 2023-01-01 RX ADMIN — CHLORHEXIDINE GLUCONATE 15 MILLILITER(S): 213 SOLUTION TOPICAL at 18:10

## 2023-01-01 RX ADMIN — HEPARIN SODIUM 5000 UNIT(S): 5000 INJECTION INTRAVENOUS; SUBCUTANEOUS at 14:53

## 2023-01-01 RX ADMIN — LACTULOSE 30 GRAM(S): 10 SOLUTION ORAL at 02:50

## 2023-01-01 RX ADMIN — LACTULOSE 20 GRAM(S): 10 SOLUTION ORAL at 14:57

## 2023-01-01 RX ADMIN — CHLORHEXIDINE GLUCONATE 1 APPLICATION(S): 213 SOLUTION TOPICAL at 05:06

## 2023-01-01 RX ADMIN — Medication 40 MILLIGRAM(S): at 07:03

## 2023-01-01 RX ADMIN — PROPOFOL 5.28 MICROGRAM(S)/KG/MIN: 10 INJECTION, EMULSION INTRAVENOUS at 00:08

## 2023-01-01 RX ADMIN — PIPERACILLIN AND TAZOBACTAM 25 GRAM(S): 4; .5 INJECTION, POWDER, LYOPHILIZED, FOR SOLUTION INTRAVENOUS at 19:34

## 2023-01-01 RX ADMIN — NAFCILLIN 200 GRAM(S): 10 INJECTION, POWDER, FOR SOLUTION INTRAVENOUS at 23:05

## 2023-01-01 RX ADMIN — LACTULOSE 30 GRAM(S): 10 SOLUTION ORAL at 21:20

## 2023-01-01 RX ADMIN — Medication 8.25 MICROGRAM(S)/KG/MIN: at 19:08

## 2023-01-01 RX ADMIN — FENTANYL CITRATE 25 MICROGRAM(S): 50 INJECTION INTRAVENOUS at 19:47

## 2023-01-01 RX ADMIN — OCTREOTIDE ACETATE 10 MICROGRAM(S)/HR: 200 INJECTION, SOLUTION INTRAVENOUS; SUBCUTANEOUS at 22:26

## 2023-01-01 RX ADMIN — Medication 25 MILLILITER(S): at 09:26

## 2023-01-01 RX ADMIN — Medication 40 MILLIGRAM(S): at 06:57

## 2023-01-01 RX ADMIN — LACTULOSE 30 GRAM(S): 10 SOLUTION ORAL at 17:30

## 2023-01-01 RX ADMIN — LACTULOSE 60 GRAM(S): 10 SOLUTION ORAL at 18:06

## 2023-01-01 RX ADMIN — NAFCILLIN 200 GRAM(S): 10 INJECTION, POWDER, FOR SOLUTION INTRAVENOUS at 03:07

## 2023-01-01 RX ADMIN — LACTULOSE 30 GRAM(S): 10 SOLUTION ORAL at 00:47

## 2023-01-01 RX ADMIN — MUPIROCIN 1 APPLICATION(S): 20 OINTMENT TOPICAL at 05:48

## 2023-01-01 RX ADMIN — NAFCILLIN 200 GRAM(S): 10 INJECTION, POWDER, FOR SOLUTION INTRAVENOUS at 02:49

## 2023-01-01 RX ADMIN — PROPOFOL 5.28 MICROGRAM(S)/KG/MIN: 10 INJECTION, EMULSION INTRAVENOUS at 08:39

## 2023-01-01 RX ADMIN — OXYMETAZOLINE HYDROCHLORIDE 1 SPRAY(S): 0.5 SPRAY NASAL at 05:13

## 2023-01-01 RX ADMIN — PREGABALIN 1000 MICROGRAM(S): 225 CAPSULE ORAL at 14:15

## 2023-01-01 RX ADMIN — OCTREOTIDE ACETATE 10 MICROGRAM(S)/HR: 200 INJECTION, SOLUTION INTRAVENOUS; SUBCUTANEOUS at 21:48

## 2023-01-01 RX ADMIN — LEVETIRACETAM 400 MILLIGRAM(S): 250 TABLET, FILM COATED ORAL at 05:06

## 2023-01-01 RX ADMIN — Medication 100 MILLIEQUIVALENT(S): at 02:45

## 2023-01-01 RX ADMIN — CHLORHEXIDINE GLUCONATE 15 MILLILITER(S): 213 SOLUTION TOPICAL at 05:15

## 2023-01-01 RX ADMIN — PANTOPRAZOLE SODIUM 40 MILLIGRAM(S): 20 TABLET, DELAYED RELEASE ORAL at 20:22

## 2023-01-01 RX ADMIN — MEROPENEM 100 MILLIGRAM(S): 1 INJECTION INTRAVENOUS at 05:47

## 2023-01-01 RX ADMIN — FENTANYL CITRATE 4.4 MICROGRAM(S)/KG/HR: 50 INJECTION INTRAVENOUS at 08:39

## 2023-01-01 RX ADMIN — Medication 1 DROP(S): at 12:25

## 2023-01-01 RX ADMIN — Medication 105 MILLIGRAM(S): at 10:32

## 2023-01-01 RX ADMIN — Medication 40 MILLIEQUIVALENT(S): at 06:41

## 2023-01-01 RX ADMIN — LACTULOSE 30 GRAM(S): 10 SOLUTION ORAL at 09:47

## 2023-01-01 RX ADMIN — SODIUM CHLORIDE 2000 MILLILITER(S): 9 INJECTION INTRAMUSCULAR; INTRAVENOUS; SUBCUTANEOUS at 14:31

## 2023-01-01 RX ADMIN — CHLORHEXIDINE GLUCONATE 1 APPLICATION(S): 213 SOLUTION TOPICAL at 05:22

## 2023-01-01 RX ADMIN — Medication 1 MILLIGRAM(S): at 12:07

## 2023-01-01 RX ADMIN — Medication 100 MILLIGRAM(S): at 21:59

## 2023-01-01 RX ADMIN — Medication 102 MILLIGRAM(S): at 15:41

## 2023-01-01 RX ADMIN — NAFCILLIN 200 GRAM(S): 10 INJECTION, POWDER, FOR SOLUTION INTRAVENOUS at 17:31

## 2023-01-01 RX ADMIN — OCTREOTIDE ACETATE 10 MICROGRAM(S)/HR: 200 INJECTION, SOLUTION INTRAVENOUS; SUBCUTANEOUS at 13:09

## 2023-01-01 RX ADMIN — PREGABALIN 1000 MICROGRAM(S): 225 CAPSULE ORAL at 14:16

## 2023-01-01 RX ADMIN — PANTOPRAZOLE SODIUM 40 MILLIGRAM(S): 20 TABLET, DELAYED RELEASE ORAL at 09:46

## 2023-01-01 RX ADMIN — Medication 105 MILLIGRAM(S): at 11:29

## 2023-01-01 RX ADMIN — CHLORHEXIDINE GLUCONATE 15 MILLILITER(S): 213 SOLUTION TOPICAL at 06:32

## 2023-01-01 RX ADMIN — NAFCILLIN 200 GRAM(S): 10 INJECTION, POWDER, FOR SOLUTION INTRAVENOUS at 10:56

## 2023-01-01 RX ADMIN — LACTULOSE 30 GRAM(S): 10 SOLUTION ORAL at 02:56

## 2023-01-01 RX ADMIN — NAFCILLIN 200 GRAM(S): 10 INJECTION, POWDER, FOR SOLUTION INTRAVENOUS at 18:55

## 2023-01-01 RX ADMIN — CEFTRIAXONE 100 MILLIGRAM(S): 500 INJECTION, POWDER, FOR SOLUTION INTRAMUSCULAR; INTRAVENOUS at 14:38

## 2023-01-01 RX ADMIN — Medication 8.25 MICROGRAM(S)/KG/MIN: at 09:46

## 2023-01-01 RX ADMIN — FENTANYL CITRATE 25 MICROGRAM(S): 50 INJECTION INTRAVENOUS at 15:40

## 2023-01-01 RX ADMIN — FENTANYL CITRATE 2.2 MICROGRAM(S)/KG/HR: 50 INJECTION INTRAVENOUS at 22:05

## 2023-01-01 RX ADMIN — PIPERACILLIN AND TAZOBACTAM 25 GRAM(S): 4; .5 INJECTION, POWDER, LYOPHILIZED, FOR SOLUTION INTRAVENOUS at 12:07

## 2023-01-01 RX ADMIN — PROPOFOL 5.28 MICROGRAM(S)/KG/MIN: 10 INJECTION, EMULSION INTRAVENOUS at 03:15

## 2023-01-01 RX ADMIN — CHLORHEXIDINE GLUCONATE 15 MILLILITER(S): 213 SOLUTION TOPICAL at 17:33

## 2023-01-01 RX ADMIN — Medication 40 MILLIGRAM(S): at 06:39

## 2023-01-01 RX ADMIN — OCTREOTIDE ACETATE 10 MICROGRAM(S)/HR: 200 INJECTION, SOLUTION INTRAVENOUS; SUBCUTANEOUS at 03:08

## 2023-01-01 RX ADMIN — Medication 4.13 MICROGRAM(S)/KG/MIN: at 12:53

## 2023-01-01 RX ADMIN — MUPIROCIN 1 APPLICATION(S): 20 OINTMENT TOPICAL at 05:43

## 2023-01-01 RX ADMIN — PROPOFOL 5.28 MICROGRAM(S)/KG/MIN: 10 INJECTION, EMULSION INTRAVENOUS at 11:07

## 2023-01-01 RX ADMIN — Medication 1 TABLET(S): at 11:21

## 2023-01-01 RX ADMIN — Medication 105 MILLIGRAM(S): at 11:00

## 2023-01-01 RX ADMIN — Medication 1 DROP(S): at 11:21

## 2023-01-01 RX ADMIN — LACTULOSE 30 GRAM(S): 10 SOLUTION ORAL at 17:19

## 2023-01-01 RX ADMIN — NAFCILLIN 200 GRAM(S): 10 INJECTION, POWDER, FOR SOLUTION INTRAVENOUS at 19:28

## 2023-01-01 RX ADMIN — PROPOFOL 5.28 MICROGRAM(S)/KG/MIN: 10 INJECTION, EMULSION INTRAVENOUS at 06:32

## 2023-01-01 RX ADMIN — NAFCILLIN 200 GRAM(S): 10 INJECTION, POWDER, FOR SOLUTION INTRAVENOUS at 10:02

## 2023-01-01 RX ADMIN — Medication 100 MILLIEQUIVALENT(S): at 01:46

## 2023-01-01 RX ADMIN — LACTULOSE 60 GRAM(S): 10 SOLUTION ORAL at 13:26

## 2023-01-01 RX ADMIN — HEPARIN SODIUM 5000 UNIT(S): 5000 INJECTION INTRAVENOUS; SUBCUTANEOUS at 21:17

## 2023-01-01 RX ADMIN — LEVETIRACETAM 400 MILLIGRAM(S): 250 TABLET, FILM COATED ORAL at 19:06

## 2023-01-01 RX ADMIN — PROPOFOL 5.28 MICROGRAM(S)/KG/MIN: 10 INJECTION, EMULSION INTRAVENOUS at 05:48

## 2023-01-01 RX ADMIN — HEPARIN SODIUM 5000 UNIT(S): 5000 INJECTION INTRAVENOUS; SUBCUTANEOUS at 21:21

## 2023-01-01 RX ADMIN — LEVETIRACETAM 400 MILLIGRAM(S): 250 TABLET, FILM COATED ORAL at 05:18

## 2023-01-01 RX ADMIN — ROBINUL 0.4 MILLIGRAM(S): 0.2 INJECTION INTRAMUSCULAR; INTRAVENOUS at 13:14

## 2023-01-01 RX ADMIN — POTASSIUM PHOSPHATE, MONOBASIC POTASSIUM PHOSPHATE, DIBASIC 62.5 MILLIMOLE(S): 236; 224 INJECTION, SOLUTION INTRAVENOUS at 08:34

## 2023-01-01 RX ADMIN — PANTOPRAZOLE SODIUM 40 MILLIGRAM(S): 20 TABLET, DELAYED RELEASE ORAL at 09:17

## 2023-01-01 RX ADMIN — VASOPRESSIN 6 UNIT(S)/MIN: 20 INJECTION INTRAVENOUS at 09:09

## 2023-01-01 RX ADMIN — DEXMEDETOMIDINE HYDROCHLORIDE IN 0.9% SODIUM CHLORIDE 4.4 MICROGRAM(S)/KG/HR: 4 INJECTION INTRAVENOUS at 10:02

## 2023-01-01 RX ADMIN — FENTANYL CITRATE 100 MICROGRAM(S): 50 INJECTION INTRAVENOUS at 18:29

## 2023-01-01 RX ADMIN — HEPARIN SODIUM 5000 UNIT(S): 5000 INJECTION INTRAVENOUS; SUBCUTANEOUS at 21:48

## 2023-01-01 RX ADMIN — Medication 100 MILLIGRAM(S): at 09:17

## 2023-01-01 RX ADMIN — OCTREOTIDE ACETATE 10 MICROGRAM(S)/HR: 200 INJECTION, SOLUTION INTRAVENOUS; SUBCUTANEOUS at 18:56

## 2023-01-01 RX ADMIN — NAFCILLIN 200 GRAM(S): 10 INJECTION, POWDER, FOR SOLUTION INTRAVENOUS at 14:52

## 2023-01-01 RX ADMIN — VASOPRESSIN 6 UNIT(S)/MIN: 20 INJECTION INTRAVENOUS at 00:08

## 2023-01-01 RX ADMIN — Medication 25 MILLILITER(S): at 21:49

## 2023-01-01 RX ADMIN — Medication 300 MILLIGRAM(S): at 00:25

## 2023-01-01 RX ADMIN — FENTANYL CITRATE 25 MICROGRAM(S): 50 INJECTION INTRAVENOUS at 16:00

## 2023-01-01 RX ADMIN — DEXMEDETOMIDINE HYDROCHLORIDE IN 0.9% SODIUM CHLORIDE 4.4 MICROGRAM(S)/KG/HR: 4 INJECTION INTRAVENOUS at 02:50

## 2023-01-01 RX ADMIN — FENTANYL CITRATE 25 MICROGRAM(S): 50 INJECTION INTRAVENOUS at 09:15

## 2023-01-01 RX ADMIN — HEPARIN SODIUM 5000 UNIT(S): 5000 INJECTION INTRAVENOUS; SUBCUTANEOUS at 14:01

## 2023-01-01 RX ADMIN — Medication 25 MILLILITER(S): at 02:31

## 2023-01-01 RX ADMIN — PIPERACILLIN AND TAZOBACTAM 25 GRAM(S): 4; .5 INJECTION, POWDER, LYOPHILIZED, FOR SOLUTION INTRAVENOUS at 11:37

## 2023-01-01 RX ADMIN — LEVETIRACETAM 400 MILLIGRAM(S): 250 TABLET, FILM COATED ORAL at 17:29

## 2023-01-01 RX ADMIN — CHLORHEXIDINE GLUCONATE 1 APPLICATION(S): 213 SOLUTION TOPICAL at 05:45

## 2023-01-01 RX ADMIN — CHLORHEXIDINE GLUCONATE 15 MILLILITER(S): 213 SOLUTION TOPICAL at 05:47

## 2023-01-01 RX ADMIN — Medication 25 MILLILITER(S): at 07:03

## 2023-01-01 RX ADMIN — Medication 25 MILLILITER(S): at 09:21

## 2023-01-01 RX ADMIN — OXYMETAZOLINE HYDROCHLORIDE 1 SPRAY(S): 0.5 SPRAY NASAL at 05:48

## 2023-01-01 RX ADMIN — LACTULOSE 30 GRAM(S): 10 SOLUTION ORAL at 10:01

## 2023-01-01 RX ADMIN — PIPERACILLIN AND TAZOBACTAM 25 GRAM(S): 4; .5 INJECTION, POWDER, LYOPHILIZED, FOR SOLUTION INTRAVENOUS at 03:48

## 2023-01-01 RX ADMIN — Medication 100 MILLIGRAM(S): at 10:29

## 2023-01-01 RX ADMIN — LACTULOSE 60 GRAM(S): 10 SOLUTION ORAL at 21:48

## 2023-01-01 RX ADMIN — OCTREOTIDE ACETATE 50 MICROGRAM(S): 200 INJECTION, SOLUTION INTRAVENOUS; SUBCUTANEOUS at 18:56

## 2023-01-01 RX ADMIN — DEXMEDETOMIDINE HYDROCHLORIDE IN 0.9% SODIUM CHLORIDE 4.4 MICROGRAM(S)/KG/HR: 4 INJECTION INTRAVENOUS at 22:04

## 2023-01-01 RX ADMIN — LACTULOSE 30 GRAM(S): 10 SOLUTION ORAL at 05:09

## 2023-01-01 RX ADMIN — PANTOPRAZOLE SODIUM 40 MILLIGRAM(S): 20 TABLET, DELAYED RELEASE ORAL at 10:17

## 2023-01-01 RX ADMIN — PANTOPRAZOLE SODIUM 40 MILLIGRAM(S): 20 TABLET, DELAYED RELEASE ORAL at 10:30

## 2023-01-01 RX ADMIN — Medication 8.25 MICROGRAM(S)/KG/MIN: at 08:39

## 2023-01-01 RX ADMIN — HEPARIN SODIUM 5000 UNIT(S): 5000 INJECTION INTRAVENOUS; SUBCUTANEOUS at 05:17

## 2023-01-01 RX ADMIN — LEVETIRACETAM 400 MILLIGRAM(S): 250 TABLET, FILM COATED ORAL at 17:16

## 2023-01-01 RX ADMIN — Medication 25 MILLILITER(S): at 21:15

## 2023-01-01 RX ADMIN — Medication 1 MILLIGRAM(S): at 11:37

## 2023-01-01 RX ADMIN — PROPOFOL 5.28 MICROGRAM(S)/KG/MIN: 10 INJECTION, EMULSION INTRAVENOUS at 17:24

## 2023-01-01 RX ADMIN — LACTULOSE 60 GRAM(S): 10 SOLUTION ORAL at 01:57

## 2023-01-01 RX ADMIN — LACTULOSE 30 GRAM(S): 10 SOLUTION ORAL at 21:16

## 2023-01-01 RX ADMIN — NAFCILLIN 200 GRAM(S): 10 INJECTION, POWDER, FOR SOLUTION INTRAVENOUS at 06:11

## 2023-01-01 RX ADMIN — Medication 1 MILLIGRAM(S): at 11:21

## 2023-01-01 RX ADMIN — NAFCILLIN 200 GRAM(S): 10 INJECTION, POWDER, FOR SOLUTION INTRAVENOUS at 23:10

## 2023-01-01 RX ADMIN — MEROPENEM 100 MILLIGRAM(S): 1 INJECTION INTRAVENOUS at 14:38

## 2023-01-01 RX ADMIN — VASOPRESSIN 6 UNIT(S)/MIN: 20 INJECTION INTRAVENOUS at 12:52

## 2023-01-01 RX ADMIN — CHLORHEXIDINE GLUCONATE 1 APPLICATION(S): 213 SOLUTION TOPICAL at 05:10

## 2023-01-01 RX ADMIN — SODIUM CHLORIDE 1000 MILLILITER(S): 9 INJECTION INTRAMUSCULAR; INTRAVENOUS; SUBCUTANEOUS at 15:41

## 2023-01-01 RX ADMIN — PANTOPRAZOLE SODIUM 40 MILLIGRAM(S): 20 TABLET, DELAYED RELEASE ORAL at 10:01

## 2023-01-01 RX ADMIN — LACTULOSE 200 GRAM(S): 10 SOLUTION ORAL at 16:33

## 2023-01-01 RX ADMIN — Medication 25 MILLILITER(S): at 10:37

## 2023-01-01 RX ADMIN — PREGABALIN 1000 MICROGRAM(S): 225 CAPSULE ORAL at 14:01

## 2023-01-01 RX ADMIN — HEPARIN SODIUM 5000 UNIT(S): 5000 INJECTION INTRAVENOUS; SUBCUTANEOUS at 21:20

## 2023-01-01 RX ADMIN — OXYMETAZOLINE HYDROCHLORIDE 1 SPRAY(S): 0.5 SPRAY NASAL at 05:22

## 2023-01-01 RX ADMIN — PANTOPRAZOLE SODIUM 40 MILLIGRAM(S): 20 TABLET, DELAYED RELEASE ORAL at 09:26

## 2023-01-01 RX ADMIN — OCTREOTIDE ACETATE 10 MICROGRAM(S)/HR: 200 INJECTION, SOLUTION INTRAVENOUS; SUBCUTANEOUS at 00:40

## 2023-01-01 RX ADMIN — LACTULOSE 60 GRAM(S): 10 SOLUTION ORAL at 09:20

## 2023-01-01 RX ADMIN — LACTULOSE 30 GRAM(S): 10 SOLUTION ORAL at 23:58

## 2023-01-01 RX ADMIN — HEPARIN SODIUM 5000 UNIT(S): 5000 INJECTION INTRAVENOUS; SUBCUTANEOUS at 05:13

## 2023-01-01 RX ADMIN — PROPOFOL 5.28 MICROGRAM(S)/KG/MIN: 10 INJECTION, EMULSION INTRAVENOUS at 00:16

## 2023-01-01 RX ADMIN — Medication 25 MILLILITER(S): at 14:52

## 2023-01-01 RX ADMIN — MEROPENEM 100 MILLIGRAM(S): 1 INJECTION INTRAVENOUS at 21:59

## 2023-01-01 RX ADMIN — PROPOFOL 5.28 MICROGRAM(S)/KG/MIN: 10 INJECTION, EMULSION INTRAVENOUS at 12:42

## 2023-01-01 RX ADMIN — NAFCILLIN 200 GRAM(S): 10 INJECTION, POWDER, FOR SOLUTION INTRAVENOUS at 22:04

## 2023-01-01 RX ADMIN — MEROPENEM 100 MILLIGRAM(S): 1 INJECTION INTRAVENOUS at 05:42

## 2023-01-01 RX ADMIN — Medication 40 MILLIGRAM(S): at 05:10

## 2023-01-01 RX ADMIN — PIPERACILLIN AND TAZOBACTAM 25 GRAM(S): 4; .5 INJECTION, POWDER, LYOPHILIZED, FOR SOLUTION INTRAVENOUS at 02:30

## 2023-01-01 RX ADMIN — CHLORHEXIDINE GLUCONATE 15 MILLILITER(S): 213 SOLUTION TOPICAL at 17:16

## 2023-01-01 RX ADMIN — NAFCILLIN 200 GRAM(S): 10 INJECTION, POWDER, FOR SOLUTION INTRAVENOUS at 16:36

## 2023-01-01 RX ADMIN — LACTULOSE 60 GRAM(S): 10 SOLUTION ORAL at 14:55

## 2023-01-01 RX ADMIN — LEVETIRACETAM 400 MILLIGRAM(S): 250 TABLET, FILM COATED ORAL at 05:21

## 2023-01-01 RX ADMIN — OXYMETAZOLINE HYDROCHLORIDE 1 SPRAY(S): 0.5 SPRAY NASAL at 05:54

## 2023-01-01 RX ADMIN — LACTULOSE 60 GRAM(S): 10 SOLUTION ORAL at 21:37

## 2023-01-01 RX ADMIN — Medication 100 MILLIEQUIVALENT(S): at 01:14

## 2023-01-01 RX ADMIN — PROPOFOL 5.28 MICROGRAM(S)/KG/MIN: 10 INJECTION, EMULSION INTRAVENOUS at 06:31

## 2023-01-01 RX ADMIN — HEPARIN SODIUM 5000 UNIT(S): 5000 INJECTION INTRAVENOUS; SUBCUTANEOUS at 14:38

## 2023-01-01 RX ADMIN — NAFCILLIN 200 GRAM(S): 10 INJECTION, POWDER, FOR SOLUTION INTRAVENOUS at 06:31

## 2023-01-01 RX ADMIN — LEVETIRACETAM 440 MILLIGRAM(S): 250 TABLET, FILM COATED ORAL at 14:38

## 2023-01-01 RX ADMIN — LEVETIRACETAM 400 MILLIGRAM(S): 250 TABLET, FILM COATED ORAL at 17:19

## 2023-01-01 RX ADMIN — FENTANYL CITRATE 100 MICROGRAM(S): 50 INJECTION INTRAVENOUS at 18:43

## 2023-01-01 RX ADMIN — NAFCILLIN 200 GRAM(S): 10 INJECTION, POWDER, FOR SOLUTION INTRAVENOUS at 11:29

## 2023-01-01 RX ADMIN — LEVETIRACETAM 400 MILLIGRAM(S): 250 TABLET, FILM COATED ORAL at 05:47

## 2023-01-01 RX ADMIN — Medication 25 MILLILITER(S): at 02:29

## 2023-01-01 RX ADMIN — CEFTRIAXONE 100 MILLIGRAM(S): 500 INJECTION, POWDER, FOR SOLUTION INTRAMUSCULAR; INTRAVENOUS at 19:32

## 2023-01-01 RX ADMIN — DESMOPRESSIN ACETATE 1 MICROGRAM(S): 0.1 TABLET ORAL at 21:41

## 2023-01-01 RX ADMIN — PREGABALIN 1000 MICROGRAM(S): 225 CAPSULE ORAL at 14:53

## 2023-01-01 RX ADMIN — NAFCILLIN 200 GRAM(S): 10 INJECTION, POWDER, FOR SOLUTION INTRAVENOUS at 10:17

## 2023-01-01 RX ADMIN — Medication 8.25 MICROGRAM(S)/KG/MIN: at 01:57

## 2023-01-01 RX ADMIN — LEVETIRACETAM 400 MILLIGRAM(S): 250 TABLET, FILM COATED ORAL at 05:10

## 2023-01-01 RX ADMIN — NAFCILLIN 200 GRAM(S): 10 INJECTION, POWDER, FOR SOLUTION INTRAVENOUS at 06:05

## 2023-01-01 RX ADMIN — PIPERACILLIN AND TAZOBACTAM 25 GRAM(S): 4; .5 INJECTION, POWDER, LYOPHILIZED, FOR SOLUTION INTRAVENOUS at 00:16

## 2023-01-01 RX ADMIN — LACTULOSE 30 GRAM(S): 10 SOLUTION ORAL at 17:33

## 2023-01-01 RX ADMIN — Medication 4.13 MICROGRAM(S)/KG/MIN: at 05:46

## 2023-01-01 RX ADMIN — CHLORHEXIDINE GLUCONATE 15 MILLILITER(S): 213 SOLUTION TOPICAL at 05:42

## 2023-01-01 RX ADMIN — LACTULOSE 30 GRAM(S): 10 SOLUTION ORAL at 14:00

## 2023-01-01 RX ADMIN — Medication 50 MILLILITER(S): at 13:40

## 2023-01-01 RX ADMIN — Medication 1 TABLET(S): at 11:37

## 2023-01-01 RX ADMIN — MUPIROCIN 1 APPLICATION(S): 20 OINTMENT TOPICAL at 17:32

## 2023-01-01 RX ADMIN — HEPARIN SODIUM 5000 UNIT(S): 5000 INJECTION INTRAVENOUS; SUBCUTANEOUS at 10:59

## 2023-01-01 RX ADMIN — SODIUM CHLORIDE 250 MILLILITER(S): 9 INJECTION, SOLUTION INTRAVENOUS at 23:55

## 2023-01-01 RX ADMIN — Medication 1 TABLET(S): at 11:00

## 2023-01-01 RX ADMIN — CEFTRIAXONE 100 MILLIGRAM(S): 500 INJECTION, POWDER, FOR SOLUTION INTRAMUSCULAR; INTRAVENOUS at 06:32

## 2023-01-01 RX ADMIN — FENTANYL CITRATE 25 MICROGRAM(S): 50 INJECTION INTRAVENOUS at 03:46

## 2023-01-01 RX ADMIN — LEVETIRACETAM 400 MILLIGRAM(S): 250 TABLET, FILM COATED ORAL at 17:32

## 2023-01-01 RX ADMIN — PANTOPRAZOLE SODIUM 40 MILLIGRAM(S): 20 TABLET, DELAYED RELEASE ORAL at 21:20

## 2023-01-01 RX ADMIN — VASOPRESSIN 6 UNIT(S)/MIN: 20 INJECTION INTRAVENOUS at 17:24

## 2023-01-01 RX ADMIN — LACTULOSE 30 GRAM(S): 10 SOLUTION ORAL at 05:15

## 2023-01-01 RX ADMIN — FENTANYL CITRATE 25 MICROGRAM(S): 50 INJECTION INTRAVENOUS at 13:54

## 2023-01-01 RX ADMIN — LEVETIRACETAM 400 MILLIGRAM(S): 250 TABLET, FILM COATED ORAL at 05:43

## 2023-01-01 RX ADMIN — Medication 100 MILLIEQUIVALENT(S): at 00:44

## 2023-01-01 RX ADMIN — Medication 1 TABLET(S): at 12:07

## 2023-01-01 RX ADMIN — LACTULOSE 30 GRAM(S): 10 SOLUTION ORAL at 05:42

## 2023-01-01 RX ADMIN — CHLORHEXIDINE GLUCONATE 15 MILLILITER(S): 213 SOLUTION TOPICAL at 05:21

## 2023-01-01 RX ADMIN — PANTOPRAZOLE SODIUM 40 MILLIGRAM(S): 20 TABLET, DELAYED RELEASE ORAL at 09:27

## 2023-01-01 RX ADMIN — FENTANYL CITRATE 25 MICROGRAM(S): 50 INJECTION INTRAVENOUS at 08:41

## 2023-01-01 RX ADMIN — Medication 8.25 MICROGRAM(S)/KG/MIN: at 03:08

## 2023-01-01 RX ADMIN — PROPOFOL 5.28 MICROGRAM(S)/KG/MIN: 10 INJECTION, EMULSION INTRAVENOUS at 20:05

## 2023-01-01 RX ADMIN — PROPOFOL 5.28 MICROGRAM(S)/KG/MIN: 10 INJECTION, EMULSION INTRAVENOUS at 05:46

## 2023-01-01 RX ADMIN — HEPARIN SODIUM 5000 UNIT(S): 5000 INJECTION INTRAVENOUS; SUBCUTANEOUS at 05:10

## 2023-01-01 RX ADMIN — LACTULOSE 30 GRAM(S): 10 SOLUTION ORAL at 19:32

## 2023-01-01 RX ADMIN — Medication 1 TABLET(S): at 10:31

## 2023-01-01 RX ADMIN — HYDROMORPHONE HYDROCHLORIDE 2 MILLIGRAM(S): 2 INJECTION INTRAMUSCULAR; INTRAVENOUS; SUBCUTANEOUS at 13:48

## 2023-01-01 RX ADMIN — LEVETIRACETAM 400 MILLIGRAM(S): 250 TABLET, FILM COATED ORAL at 18:05

## 2023-01-01 RX ADMIN — MUPIROCIN 1 APPLICATION(S): 20 OINTMENT TOPICAL at 05:22

## 2023-01-01 RX ADMIN — HEPARIN SODIUM 5000 UNIT(S): 5000 INJECTION INTRAVENOUS; SUBCUTANEOUS at 14:15

## 2023-01-01 RX ADMIN — LACTULOSE 30 GRAM(S): 10 SOLUTION ORAL at 05:22

## 2023-01-01 RX ADMIN — LACTULOSE 60 GRAM(S): 10 SOLUTION ORAL at 10:31

## 2023-01-01 RX ADMIN — LACTULOSE 30 GRAM(S): 10 SOLUTION ORAL at 02:41

## 2023-01-01 RX ADMIN — PANTOPRAZOLE SODIUM 40 MILLIGRAM(S): 20 TABLET, DELAYED RELEASE ORAL at 22:00

## 2023-01-01 RX ADMIN — MEROPENEM 100 MILLIGRAM(S): 1 INJECTION INTRAVENOUS at 21:05

## 2023-01-01 RX ADMIN — OCTREOTIDE ACETATE 10 MICROGRAM(S)/HR: 200 INJECTION, SOLUTION INTRAVENOUS; SUBCUTANEOUS at 12:53

## 2023-01-01 RX ADMIN — LACTULOSE 200 GRAM(S): 10 SOLUTION ORAL at 15:21

## 2023-01-01 RX ADMIN — PROPOFOL 5.28 MICROGRAM(S)/KG/MIN: 10 INJECTION, EMULSION INTRAVENOUS at 03:14

## 2023-01-01 RX ADMIN — HYDROMORPHONE HYDROCHLORIDE 2 MILLIGRAM(S): 2 INJECTION INTRAMUSCULAR; INTRAVENOUS; SUBCUTANEOUS at 13:14

## 2023-01-01 RX ADMIN — PROPOFOL 5.28 MICROGRAM(S)/KG/MIN: 10 INJECTION, EMULSION INTRAVENOUS at 22:05

## 2023-01-01 RX ADMIN — Medication 4.13 MICROGRAM(S)/KG/MIN: at 03:15

## 2023-01-01 RX ADMIN — Medication 1 TABLET(S): at 11:28

## 2023-01-01 RX ADMIN — Medication 1 MILLIGRAM(S): at 11:28

## 2023-01-01 RX ADMIN — LACTULOSE 30 GRAM(S): 10 SOLUTION ORAL at 11:21

## 2023-01-01 RX ADMIN — FENTANYL CITRATE 4.4 MICROGRAM(S)/KG/HR: 50 INJECTION INTRAVENOUS at 22:26

## 2023-01-01 RX ADMIN — FENTANYL CITRATE 100 MICROGRAM(S): 50 INJECTION INTRAVENOUS at 18:40

## 2023-01-01 RX ADMIN — Medication 25 MILLILITER(S): at 15:20

## 2023-01-01 RX ADMIN — PIPERACILLIN AND TAZOBACTAM 25 GRAM(S): 4; .5 INJECTION, POWDER, LYOPHILIZED, FOR SOLUTION INTRAVENOUS at 19:52

## 2023-01-01 RX ADMIN — PROPOFOL 5.28 MICROGRAM(S)/KG/MIN: 10 INJECTION, EMULSION INTRAVENOUS at 21:05

## 2023-01-01 RX ADMIN — PROPOFOL 5.28 MICROGRAM(S)/KG/MIN: 10 INJECTION, EMULSION INTRAVENOUS at 12:25

## 2023-01-01 RX ADMIN — LEVETIRACETAM 400 MILLIGRAM(S): 250 TABLET, FILM COATED ORAL at 05:12

## 2023-01-01 RX ADMIN — PANTOPRAZOLE SODIUM 40 MILLIGRAM(S): 20 TABLET, DELAYED RELEASE ORAL at 20:50

## 2023-01-01 RX ADMIN — LACTULOSE 30 GRAM(S): 10 SOLUTION ORAL at 09:27

## 2023-01-01 RX ADMIN — OCTREOTIDE ACETATE 10 MICROGRAM(S)/HR: 200 INJECTION, SOLUTION INTRAVENOUS; SUBCUTANEOUS at 07:13

## 2023-01-01 RX ADMIN — PANTOPRAZOLE SODIUM 40 MILLIGRAM(S): 20 TABLET, DELAYED RELEASE ORAL at 21:16

## 2023-01-01 RX ADMIN — PROPOFOL 5.28 MICROGRAM(S)/KG/MIN: 10 INJECTION, EMULSION INTRAVENOUS at 07:11

## 2023-01-01 RX ADMIN — LEVETIRACETAM 400 MILLIGRAM(S): 250 TABLET, FILM COATED ORAL at 14:56

## 2023-01-01 RX ADMIN — Medication 40 MILLIGRAM(S): at 05:14

## 2023-01-01 RX ADMIN — Medication 100 MILLIGRAM(S): at 21:05

## 2023-01-01 RX ADMIN — Medication 1 MILLIGRAM(S): at 10:32

## 2023-01-01 RX ADMIN — Medication 1 MILLIGRAM(S): at 11:00

## 2023-01-01 RX ADMIN — MIDAZOLAM HYDROCHLORIDE 4 MILLIGRAM(S): 1 INJECTION, SOLUTION INTRAMUSCULAR; INTRAVENOUS at 18:20

## 2023-01-01 RX ADMIN — CHLORHEXIDINE GLUCONATE 1 APPLICATION(S): 213 SOLUTION TOPICAL at 05:15

## 2023-01-01 RX ADMIN — LACTULOSE 60 GRAM(S): 10 SOLUTION ORAL at 18:05

## 2023-05-08 NOTE — ED PROVIDER NOTE - RESPIRATORY, MLM
Schedule fasting labwork  Schedule X-ray knees  Schedule 2D echo of heart  Schedule covid-19 bivalent booster    Schedule your sleep study with sleep medicine department.  Shingles prescription printed, get this done at any local pharmacy.   Referral to bariatric medicine clinic placed, they should be contacting you.    Return to clinic in 6 months   In no respiratory distress.

## 2023-09-14 NOTE — ED ADULT TRIAGE NOTE - GLASGOW COMA SCALE: EYE OPENING, MLM
Hyzaar refilled and sent to pharmacy. Please notify pt.
I called and spoke with the patient and informed the patient that his refill request was approved and sent to the pharmacy. Patient verbalized understanding.
(E4) spontaneous

## 2023-09-14 NOTE — ED PROVIDER NOTE - OBJECTIVE STATEMENT
53-year-old male with history of EtOH abuse and cirrhosis, history of anemia requiring transfusion in the past, brought in by EMS for altered mental status.  Patient's partner states that he had a mechanical fall last night, afterwards told her not to call EMS to take him to the hospital.  Today, patient has been less responsive than usual, so she called EMS to bring him in.  Partner states that patient developed extensive bruising to left abdomen and flank 2 to 3 days ago, prior to last night's fall.

## 2023-09-14 NOTE — ED ADULT NURSE NOTE - NSFALLRISKINTERV_ED_ALL_ED
Assistance OOB with selected safe patient handling equipment if applicable/Assistance with ambulation/Communicate fall risk and risk factors to all staff, patient, and family/Monitor gait and stability/Monitor for mental status changes and reorient to person, place, and time, as needed/Move patient closer to nursing station/within visual sight of ED staff/Provide visual cue: yellow wristband, yellow gown, etc/Reinforce activity limits and safety measures with patient and family/Toileting schedule using arm’s reach rule for commode and bathroom/Use of alarms - bed, stretcher, chair and/or video monitoring/Call bell, personal items and telephone in reach/Instruct patient to call for assistance before getting out of bed/chair/stretcher/Non-slip footwear applied when patient is off stretcher/[place] to call system/Physically safe environment - no spills, clutter or unnecessary equipment/Purposeful Proactive Rounding/Room/bathroom lighting operational, light cord in reach

## 2023-09-14 NOTE — ED PROVIDER NOTE - CRITICAL CARE ATTENDING CONTRIBUTION TO CARE
The patient was seen immediately upon arrival due to a high probability of imminent or life-threatening deterioration secondary to AMS and intracranial bleed, which required my direct attention, intervention, and personal management at the bedside. I have personally provided critical care time exclusive of time spent on separately billable procedures. Time includes review of laboratory data, radiology results, discussion with consultants, discussion with the patient's family, and monitoring for potential decompensation.

## 2023-09-14 NOTE — CONSULT NOTE ADULT - ATTENDING COMMENTS
WILLIAM versus CKD-- perhaps HRS vs volume depletion with low ur Na   hyponatremia - though relatively nl serum osm   encephalopathy --likely primarily hepatic but consider metabolic as well- Na improved appropriately after NS and serum osm now almost nl  would still avoid excessive rise in na as hepatic / etoh pts highest risk ODS-- keep serum Na rise to 8 in 24 max WILLIAM versus CKD-- perhaps HRS vs volume depletion with low ur Na   hyponatremia - though relatively nl serum osm   encephalopathy --likely primarily hepatic but consider metabolic as well- Na improved appropriately after NS and serum osm now almost nl  would still avoid excessive rise in na as hepatic / etoh pts highest risk ODS-- keep serum Na rise to 6-8 in 24 max- can use D5W if rise excessive --or ddavp if polyuria   does not appear to have toxic alcohol without severe metabolic acidosis or very high osm gap

## 2023-09-14 NOTE — PROVIDER CONTACT NOTE (EICU) - RECOMMENDATIONS
NEURO: Right Frontal ICH; keep SBP<140/80, repeat CTB 6 hours and serial neuro checks, ammonia 164 consider lacutlose/ rifaximin  CARDIAC: Strict BP control as mentioned above  PULM: CTA neg, no focal are of consolidation noted, given AMS/ suppressed neuro status should at continuous end tidal CO2  GI: NPO, known hx of chronic liver dysfunction, MELD score 40; with 71.3% 3month mortality, Follow up CTAbd/pelvis; possible portal htn in the setting of liver failure, BUN elevated w/ anemia cannot exclude GIB should add protonix gtt or IV BID  : silverman, Strict INS/OUTs,  RENAL:  rule out obstruction and hydronephrosis, CPK assess for rhabdo, renally dose meds, may need to aim resuscitation at intravascular volume repletion with blood. Serum Na 118 question chronicity due to ETOH abuse; would avoid rapid over-correction.   Hem: hgb 7.3 (unknown baseline) hold DVT prophylaxis, SCDs only, may need to transfuse if active bleeding noted, if due to chronic alcohol consumption would preference conservative transfusion trigger to avoid increase portal htn.   ID: UA pos; should cover for GN organisms (renally dose abx)   Endo:

## 2023-09-14 NOTE — ED PROVIDER NOTE - CARE PLAN
1 Principal Discharge DX:	Altered mental status  Secondary Diagnosis:	Hepatic encephalopathy  Secondary Diagnosis:	Hepatorenal syndrome

## 2023-09-14 NOTE — ED PROVIDER NOTE - PHYSICAL EXAMINATION
Constitutional: ill appearing, jaundiced  HEENT: head normocephalic and atraumatic. moist mucous membranes  Eyes: extraocular movements intact, normal conjunctiva, scleral icterus  Neck: supple, normal ROM  Cardiovascular: regular rate   Pulmonary: no respiratory distress  Gastrointestinal: abdomen mildly distended, nontender to palpation.  extensive bruising to L abdomen and flank  Skin: warm, dry, normal for ethnicity  Musculoskeletal: no edema, no deformity  Neurological: lying with eyes closed, follows basic directions to verbal command, moves all extremities equally.  Psychiatric: calm and cooperative

## 2023-09-14 NOTE — CONSULT NOTE ADULT - SUBJECTIVE AND OBJECTIVE BOX
HPI:  53M Hx of EtOH abuse and cirrhosis, anemia, and GERD requiring transfusion in the past, brought in by EMS for altered mental status.  Patient's partner states that he had a mechanical fall last night, afterwards told her not to call EMS to take him to the hospital.  Today, patient has been less responsive than usual, so she called EMS to bring him in.  Partner states that patient developed extensive bruising to left abdomen and flank 2 to 3 days ago, prior to last night's fall. Pt normally drinks 1 bottle of vodka daily but has been cutting back to a few shots.     ED Course:   T HR 90 BP 97-38 RR 20 -100% 12L Nonrebreather (14 Sep 2023 19:54)      PAST MEDICAL & SURGICAL HISTORY:        Allergies:  No Known Allergies      Home Medications:       Hospital Medications:   MEDICATIONS  (STANDING):  albumin human 25% IVPB 100 milliLiter(s) IV Intermittent every 6 hours  chlorhexidine 0.12% Liquid 15 milliLiter(s) Oral Mucosa every 12 hours  desmopressin Injectable 1 MICROGram(s) IV Push once  folic acid 1 milliGRAM(s) Oral daily  heparin   Injectable 5000 Unit(s) SubCutaneous every 8 hours  lactulose Syrup 30 Gram(s) Oral every 4 hours  multivitamin 1 Tablet(s) Oral daily  norepinephrine Infusion 0.05 MICROgram(s)/kG/Min (8.25 mL/Hr) IV Continuous <Continuous>  prednisoLONE    3 mG/mL Solution (ORAPRED) 40 milliGRAM(s) Oral daily  propofol Injectable 15 milliGRAM(s) IV Push once  rifAXIMin 550 milliGRAM(s) Oral two times a day  thiamine IVPB 500 milliGRAM(s) IV Intermittent daily      SOCIAL HISTORY:  Denies ETOh, Smoking    Family History:  FAMILY HISTORY:        VITALS:  T(F): 96.1 (09-14-23 @ 18:25), Max: 96.1 (09-14-23 @ 17:08)  HR: 82 (09-14-23 @ 19:00)  BP: 113/55 (09-14-23 @ 19:00)  RR: 13 (09-14-23 @ 19:00)  SpO2: 98% (09-14-23 @ 19:00)  Wt(kg): --    09-14 @ 07:01  -  09-14 @ 20:44  --------------------------------------------------------  IN: 50 mL / OUT: 20 mL / NET: 30 mL      Height (cm): 172.7 (09-14 @ 20:12)  Weight (kg): 88 (09-14 @ 18:36)  BMI (kg/m2): 29.5 (09-14 @ 20:12)  BSA (m2): 2.02 (09-14 @ 20:12)  CAPILLARY BLOOD GLUCOSE      POCT Blood Glucose.: 114 mg/dL (14 Sep 2023 13:45)      Review of Systems:  Negative except as mentioned in HPI    PHYSICAL EXAM:  GENERAL: Alert, awake, oriented x3   CHEST/LUNG: Bilateral clear breath sounds  HEART: Regular rate and rhythm, no murmur, no gallops, no rub   ABDOMEN: Soft, nontender, non distended  : No flank or supra pubic tenderness.  EXTREMITIES: no pedal edema  Neurology: AAOx3, no focal neurological deficit      LABS:  09-14    121<L>  |  87<L>  |  49<H>  ----------------------------<  113<H>  4.8   |  16<L>  |  5.80<H>    Ca    7.9<L>      14 Sep 2023 19:51  Phos  5.4     09-14  Mg     2.3     09-14    TPro  5.1<L>  /  Alb  2.1<L>  /  TBili  27.3<H>  /  DBili      /  AST  119<H>  /  ALT  41  /  AlkPhos  157<H>  09-14    Creatinine Trend: 5.80 <--, 5.44 <--, 6.78 <--                        7.1    11.30 )-----------( 106      ( 14 Sep 2023 17:45 )             19.7     Urine Studies:  Urinalysis Basic - ( 14 Sep 2023 19:51 )    Color:  / Appearance:  / SG:  / pH:   Gluc: 113 mg/dL / Ketone:   / Bili:  / Urobili:    Blood:  / Protein:  / Nitrite:    Leuk Esterase:  / RBC:  / WBC    Sq Epi:  / Non Sq Epi:  / Bacteria:       Osmolality, Random Urine: 290 mosm/kg (09-14 @ 18:35)  Sodium, Random Urine: <20 mmol/L (09-14 @ 18:35)             HPI:  53M Hx of EtOH abuse and cirrhosis, brought in by EMS for altered mental status. Patient reportedly had a mechanical fall last night. Today, patient was less responsive than usual, so partner called EMS to bring him in.  Partner states that patient developed extensive bruising to left abdomen and flank 2 to 3 days ago, prior to last night's fall. Pt normally drinks 1 bottle of vodka daily but has been cutting back to a few shots.   In ED, HR 90 BP 97-38 RR 20 -100% 12L Nonrebreather. BP improved after 2L NS. Labs w/ Na 118, BUN 55, Cr 6.78, bicarb 18, pH 7.39. Nephrology consulted for WILLIAM and hyponatremia. Pt remained altered, requiring intubation in ICU      PAST MEDICAL & SURGICAL HISTORY:        Allergies:  No Known Allergies      Home Medications:       Hospital Medications:   MEDICATIONS  (STANDING):  albumin human 25% IVPB 100 milliLiter(s) IV Intermittent every 6 hours  chlorhexidine 0.12% Liquid 15 milliLiter(s) Oral Mucosa every 12 hours  desmopressin Injectable 1 MICROGram(s) IV Push once  folic acid 1 milliGRAM(s) Oral daily  heparin   Injectable 5000 Unit(s) SubCutaneous every 8 hours  lactulose Syrup 30 Gram(s) Oral every 4 hours  multivitamin 1 Tablet(s) Oral daily  norepinephrine Infusion 0.05 MICROgram(s)/kG/Min (8.25 mL/Hr) IV Continuous <Continuous>  prednisoLONE    3 mG/mL Solution (ORAPRED) 40 milliGRAM(s) Oral daily  propofol Injectable 15 milliGRAM(s) IV Push once  rifAXIMin 550 milliGRAM(s) Oral two times a day  thiamine IVPB 500 milliGRAM(s) IV Intermittent daily      SOCIAL HISTORY:  Denies ETOh, Smoking    Family History:  FAMILY HISTORY:        VITALS:  T(F): 96.1 (09-14-23 @ 18:25), Max: 96.1 (09-14-23 @ 17:08)  HR: 82 (09-14-23 @ 19:00)  BP: 113/55 (09-14-23 @ 19:00)  RR: 13 (09-14-23 @ 19:00)  SpO2: 98% (09-14-23 @ 19:00)  Wt(kg): --    09-14 @ 07:01  -  09-14 @ 20:44  --------------------------------------------------------  IN: 50 mL / OUT: 20 mL / NET: 30 mL      Height (cm): 172.7 (09-14 @ 20:12)  Weight (kg): 88 (09-14 @ 18:36)  BMI (kg/m2): 29.5 (09-14 @ 20:12)  BSA (m2): 2.02 (09-14 @ 20:12)  CAPILLARY BLOOD GLUCOSE      POCT Blood Glucose.: 114 mg/dL (14 Sep 2023 13:45)      Review of Systems:  Negative except as mentioned in HPI    PHYSICAL EXAM:  GENERAL: Awake, Altered   CHEST/LUNG: Symmetric rise  HEART: Regular rate and rhythm  ABDOMEN: Nondistended  : Duron in place  EXTREMITIES: no edema  SKIN: Jaundiced  Neurology: Awake, altered, moving extremities spontaneously      LABS:  09-14    121<L>  |  87<L>  |  49<H>  ----------------------------<  113<H>  4.8   |  16<L>  |  5.80<H>    Ca    7.9<L>      14 Sep 2023 19:51  Phos  5.4     09-14  Mg     2.3     09-14    TPro  5.1<L>  /  Alb  2.1<L>  /  TBili  27.3<H>  /  DBili      /  AST  119<H>  /  ALT  41  /  AlkPhos  157<H>  09-14    Creatinine Trend: 5.80 <--, 5.44 <--, 6.78 <--                        7.1    11.30 )-----------( 106      ( 14 Sep 2023 17:45 )             19.7     Urine Studies:  Urinalysis Basic - ( 14 Sep 2023 19:51 )    Color:  / Appearance:  / SG:  / pH:   Gluc: 113 mg/dL / Ketone:   / Bili:  / Urobili:    Blood:  / Protein:  / Nitrite:    Leuk Esterase:  / RBC:  / WBC    Sq Epi:  / Non Sq Epi:  / Bacteria:       Osmolality, Random Urine: 290 mosm/kg (09-14 @ 18:35)  Sodium, Random Urine: <20 mmol/L (09-14 @ 18:35)

## 2023-09-14 NOTE — H&P ADULT - NSHPSOCIALHISTORY_GEN_ALL_CORE
Lives at home with partner, Drinks 5-6 shots daily (previously 1L vodka daily), smoked few cigs/day for 30+ years.

## 2023-09-14 NOTE — H&P ADULT - NSHPPHYSICALEXAM_GEN_ALL_CORE
PHYSICAL EXAM:  GENERAL: ill appearing and somnolent  HEAD:  Atraumatic   EYES: icteric sclera  ENT: dry mucous membrane  NECK: Supple, No JVD  CHEST/LUNG: Clear to auscultation bilaterally; No rales, rhonchi, wheezing or rubs. Unlabored respirations  HEART: Regular rate and rhythm; No murmurs, rubs, or gallops  ABDOMEN: Bowel sounds present; large and distended but soft and non-tender. No guarding or rigidity    EXTREMITIES:  2+ Peripheral Pulses, brisk capillary refill. No clubbing, cyanosis, or edema  NERVOUS SYSTEM:  Unable to obtain  SKIN: diffuse jaundice throughout body

## 2023-09-14 NOTE — H&P ADULT - NSHPLABSRESULTS_GEN_ALL_CORE
LABS:  09-14 @ 17:45 Creatine 24 U/L<L> [30 - 200]  cret                        7.1    11.30 )-----------( 106      ( 14 Sep 2023 17:45 )             19.7     09-14    121<L>  |  87<L>  |  49<H>  ----------------------------<  113<H>  4.8   |  16<L>  |  5.80<H>    Ca    7.9<L>      14 Sep 2023 19:51  Phos  5.4     09-14  Mg     2.3     09-14    TPro  5.1<L>  /  Alb  2.1<L>  /  TBili  27.3<H>  /  DBili  x   /  AST  119<H>  /  ALT  41  /  AlkPhos  157<H>  09-14    PT/INR - ( 14 Sep 2023 20:52 )   PT: 29.5 sec;   INR: 2.66          PTT - ( 14 Sep 2023 20:52 )  PTT:55.1 sec

## 2023-09-14 NOTE — ED ADULT NURSE NOTE - OBJECTIVE STATEMENT
53-year-old male with history of EtOH abuse and cirrhosis, history of anemia requiring transfusion in the past, brought in by EMS for altered mental status.  Patient's partner states that he had a mechanical fall last night, afterwards told her not to call EMS to take him to the hospital.  Today, patient has been less responsive than usual, so she called EMS to bring him in.  Partner states that patient developed extensive bruising to left abdomen and flank 2 to 3 days ago, prior to last night's fall. ecchymosis and firmness to palpation noted to L flank. pt is notably jaundice, unable to cooperate or follow commands, resp are labored. unable to assess mental status, pt responds to tactile stimulation.

## 2023-09-14 NOTE — H&P ADULT - ASSESSMENT
53M Hx of EtOH abuse and cirrhosis, anemia, and GERD requiring transfusion in the past, brought in by EMS for altered mental status now intubated and sedated pending repeat CTH and workup and management of hepatic encephalopathy and hepatorenal syndrome.    Neuro  #Acute metabolic encephalopathy  Intubated and sedated. Per wife AAOx3 at baseline. Likely in setting of hepatic encephalopathy  - SAT and SBT  - NGT, xray to confirm placement and start lactulose 30mg q4h and rifaximin 550mg bid    #Intra-cranial bleed  CT head with extra-axial hematoma in right frontal lobe. ED at [facility] discussed with neurosurgery and suspect bleed to not be causing AMS. s/p vitamin K and keppra 1g  - FFP  - Repeat CT head and follow up with neurosurgery    Card  DENNY    Pulm  #Intubated  #Aspiration PNA  Found to be increasingly altered per wife and s/p unwitnessed fall yesterday. Received CTX 1g at [facility]  - CTX 2g daily  - follow up sputum culture  - SAT/SBT  - ABG    GI  #Cirrhosis  #Hepatic encephalopathy  #?SBP  History of chronic alcohol use presenting with hepatic ecephalopathy, ammonia 169 with distended abdomen, soft and non-tender and diffusely jaundice. Bedside POCUS with small pockets  - CTX 2g daily for SBP ppx  - follow up hepatitis panel, HIV    Nephro  #Hepatorenal syndrome  BUN 55, Cr 6.78 suspect heptorenal syndrome in setting of portal hypertension with splachnic arterial vasodilation resulting in decreased renal perfusion  - Albumin 25% IVPB 100mg q6h for 3 days  - Norepinephrine with goal MAP 70 (10 above presenting MAP of 60)  - Nephrology recs  - Strict I/O  - Avoid nephrotoxic med and renally dose meds    #Hyponatremia  Na 118, received 3L NS in ED  - 3% NS @ 25cc/hr  - Repeat BMP q4h with goal Na 122 by 6PM 9/15  - If Na 121 or higher can give dose of DDAVP 1mcg (can give additional dose 6 hours after if needed)  - Urine Na and osm with BMP  - Nephro recs    Heme  #Anemia  Hb 7.3, .5 likely in setting of alcohol use disorder. Has ecchymosis on flank however no active bleed and known history of anemia. INR 2.5  - Vitamin b12, folic acid 1mg daily  - f/u serum b12 and folate  - Type & Screen  - Transfuse for Hb <7 or <8 with active bleed  - FFP 1 unit and repeat coags    #Thrombocytopenia  Platelet 115, likely 2/2 alcohol use disorder  - Trend platelets  - Transfuse <10, <20 with fever or <50 with bleeding    ID  #Sepsis  Met 2/4 SIRS criteria (leukocytosis, tachycardic) with suspected asp PNA vs SBP  - blood, urine, sputum cultures  - continue CTX 2g daily  - f/u cdiff in setting of mucous/loose stool (may be 2/2 lactulose)    Fluids: s/p 3L NS  Electrolytes: Caution in setting of suspected WILLIAM  Nutrition: NPO  DVT ppx: Heparin 5000 q8h (prothrombotic state)  GI ppx: Protonix  Code: Full code  Dispo: MICU 53M Hx of EtOH abuse and cirrhosis, anemia, and GERD requiring transfusion in the past, brought in by EMS for altered mental status now intubated and sedated pending repeat CTH and workup and management of hepatic encephalopathy and hepatorenal syndrome.    Neuro  #Acute metabolic encephalopathy  Intubated and sedated. Per wife AAOx3 at baseline. Likely in setting of hepatic encephalopathy  - SAT and SBT  - NGT, xray to confirm placement and start lactulose 30mg q4h and rifaximin 550mg bid    #Intra-cranial bleed  CT head with extra-axial hematoma in right frontal lobe. ED at [facility] discussed with neurosurgery and suspect bleed to not be causing AMS. s/p vitamin K and keppra 1g  - FFP  - Repeat CT head and follow up with neurosurgery    Card  DENNY    Pulm  #Intubated  #Aspiration PNA  Found to be increasingly altered per wife and s/p unwitnessed fall yesterday. Received CTX 1g at [facility]  - CTX 2g daily  - follow up sputum culture  - SAT/SBT  - ABG    GI  #Cirrhosis  #Hepatic encephalopathy  #?SBP  History of chronic alcohol use presenting with hepatic ecephalopathy, ammonia 169 with distended abdomen, soft and non-tender and diffusely jaundice. Bedside POCUS with small pockets  - CTX 2g daily for SBP ppx  - follow up hepatitis panel, HIV    #Elevated Tbili  Tbili >25, direct bili >16. CTA a/p: normal biliary duct/gallbladder but has hepatic steatosis with hepatomegaly, and portal hypertension  - Trend tbili  - Hapto/ LDH    Nephro  #WILLIAM vs WILLIAM on CKD  #Hepatorenal syndrome  BUN 55, Cr 6.78 suspect heptorenal syndrome in setting of portal hypertension with splachnic arterial vasodilation resulting in decreased renal perfusion  - Albumin 25% IVPB 100mg q6h for 3 days  - Norepinephrine with goal MAP 70 (10 above presenting MAP of 60)  - Nephrology recs  - Strict I/O  - Avoid nephrotoxic med and renally dose meds  - CK    #Hyponatremia  Na 118, received 3L NS in ED  - 3% NS @ 25cc/hr  - Repeat BMP q4h with goal Na 122 by 6PM 9/15  - If Na 121 or higher can give dose of DDAVP 1mcg (can give additional dose 6 hours after if needed)  - Urine Na and osm with BMP  - Nephro recs    #NAGMA  Bicarb 18, normal AG. Had episode of loose/mucous stool  - Trend bicarb and AG  - cdiff and GI PCR    Heme  #Anemia  Hb 7.3, .5 likely in setting of alcohol use disorder. Has ecchymosis on flank however no active bleed and known history of anemia. INR 2.5  - Vitamin b12, folic acid 1mg daily  - f/u serum b12 and folate  - Type & Screen  - Transfuse for Hb <7 or <8 with active bleed  - FFP 1 unit and repeat coags  - LDH and haptoglobin    #Thrombocytopenia  Platelet 115, likely 2/2 alcohol use disorder  - Trend platelets  - Transfuse <10, <20 with fever or <50 with bleeding    ID  #Sepsis  Met 2/4 SIRS criteria (leukocytosis, tachycardic) with suspected asp PNA vs SBP  - blood, urine, sputum cultures  - continue CTX 2g daily  - f/u cdiff in setting of mucous/loose stool (may be 2/2 lactulose)    Fluids: s/p 3L NS  Electrolytes: Caution in setting of suspected WILLIAM  Nutrition: NPO  DVT ppx: Heparin 5000 q8h (prothrombotic state)  GI ppx: Protonix  Code: Full code  Dispo: MICU 53M Hx of EtOH abuse and cirrhosis, anemia, and GERD requiring transfusion in the past, brought in by EMS for altered mental status now intubated and sedated pending repeat CTH and workup and management of hepatic encephalopathy and hepatorenal syndrome.    Neuro  #Acute metabolic encephalopathy  Intubated and sedated. Per wife AAOx3 at baseline. Likely in setting of hepatic encephalopathy  - SAT and SBT  - NGT, xray to confirm placement and start lactulose 30mg q4h and rifaximin 550mg bid    #Intra-cranial bleed  CT head with extra-axial hematoma in right frontal lobe. ED at [facility] discussed with neurosurgery and suspect bleed to not be causing AMS. s/p vitamin K and keppra 1g  - FFP  - Repeat CT head and follow up with neurosurgery    Card  DENNY    Pulm  #Intubated  #Aspiration PNA  Found to be increasingly altered per wife and s/p unwitnessed fall yesterday. Received CTX 1g at [facility]  - CTX 2g daily  - follow up sputum culture  - SAT/SBT  - ABG    GI  #Cirrhosis  #Hepatic encephalopathy  #?SBP  History of chronic alcohol use presenting with hepatic ecephalopathy, ammonia 169 with distended abdomen, soft and non-tender and diffusely jaundice. Bedside POCUS with small pockets  - CTX 2g daily for SBP ppx  - follow up hepatitis panel, HIV    #Elevated Tbili  Tbili >25, direct bili >16. CTA a/p: normal biliary duct/gallbladder but has hepatic steatosis with hepatomegaly, and portal hypertension  - Trend tbili  - Hapto/ LDH    Nephro  #WILLIAM vs WILLIAM on CKD  #Hepatorenal syndrome  BUN 55, Cr 6.78 suspect heptorenal syndrome in setting of portal hypertension with splachnic arterial vasodilation resulting in decreased renal perfusion  - Albumin 25% IVPB 100mg q6h for 3 days  - Norepinephrine with goal MAP 70 (10 above presenting MAP of 60)  - Nephrology recs  - Strict I/O  - Avoid nephrotoxic med and renally dose meds  - CK    #Hyponatremia  Na 118, received 3L NS in ED  - 3% NS @ 25cc/hr  - Repeat BMP q4h with goal Na 122 by 6PM 9/15  - If Na 121 or higher can give dose of DDAVP 1mcg (can give additional dose 6 hours after if needed)  - Urine Na and osm with BMP  - Nephro recs    #NAGMA  Bicarb 18, normal AG. Had episode of loose/mucous stool  - Trend bicarb and AG  - cdiff and GI PCR    Heme  #Anemia  Hb 7.3, .5 likely in setting of alcohol use disorder. History of hemorrhoids. Has ecchymosis on flank however no active bleed and known history of anemia. Partner denies history of esophageal varices. INR 2.5  - Vitamin b12, folic acid 1mg daily  - f/u serum b12 and folate  - Type & Screen  - Transfuse for Hb <7 or <8 with active bleed  - FFP 1 unit and repeat coags  - LDH and haptoglobin  - Iron profile    #Thrombocytopenia  Platelet 115, likely 2/2 alcohol use disorder  - Trend platelets  - Transfuse <10, <20 with fever or <50 with bleeding    ID  #Sepsis  Met 2/4 SIRS criteria (leukocytosis, tachycardic) with suspected asp PNA vs SBP  - blood, urine, sputum cultures  - continue CTX 2g daily  - f/u cdiff in setting of mucous/loose stool (may be 2/2 lactulose)    Fluids: s/p 3L NS  Electrolytes: Caution in setting of suspected WILLIAM  Nutrition: NPO  DVT ppx: Heparin 5000 q8h (prothrombotic state)  GI ppx: Protonix  Code: Full code  Dispo: MICU

## 2023-09-14 NOTE — H&P ADULT - HISTORY OF PRESENT ILLNESS
53M Hx of EtOH abuse and cirrhosis, anemia, and GERD requiring transfusion in the past, brought in by EMS for altered mental status.  Patient's partner states that he had a mechanical fall last night, afterwards told her not to call EMS to take him to the hospital.  Today, patient has been less responsive than usual, so she called EMS to bring him in.  Partner states that patient developed extensive bruising to left abdomen and flank 2 to 3 days ago, prior to last night's fall. Pt normally drinks 1 bottle of vodka daily but has been cutting back to a few shots.     ED Course:   T HR 90 BP 97-38 RR 20 -100% 12L Nonrebreather CC: AMS  HPI: 53M Hx of EtOH abuse and cirrhosis, anemia, and GERD requiring transfusion in the past, brought in by EMS for altered mental status.  Patient's partner states that he had a mechanical fall last night, afterwards told her not to call EMS to take him to the hospital.  Today, patient has been less responsive than usual, so she called EMS to bring him in.  Partner states that patient developed extensive bruising to left abdomen and flank 2 to 3 days ago, prior to last night's fall. Pt normally drinks 1 bottle of vodka daily but has been cutting back to a few shots. Unable to obtain ROS as patient somnolent.      In the ED:    - VS: T HR 90 BP 97-38 RR 20 -100% 12L Nonrebreather    - Pertinent Labs: Hb 12.6, Hb 7.3, plt 115, INR 2.56, Na 118, Cl 85, bicarb 18, BUN 55, Cr 6.78, Tbili >25, direct bili >16, Alk felecia 173, , ALT 46, ammonia 169, lipase 265, BNP 2505, UA moderate LE, + nitrite, WBC 21, RBC 32    - Imaging: CXR: CT head: extra-axial hematoma right frontal region  CTA chest/a/p: No dissection/aneurysm, hepatic steatosis with hepatomegaly and signs of portal hypertension, borderline splenomegaly, trace ascites  EKG:     - Treatment/interventions: lactulose 20mg x1, keppra 1g, phytonadione 10mg x1, 3L NS    PMHx: EtOH abuse and cirrhosis, anemia, and GERD   PSHx:   Meds: See med rec  Allergies: NKDA  Social: see below

## 2023-09-14 NOTE — ED ADULT TRIAGE NOTE - CHIEF COMPLAINT QUOTE
Pt BIBA as a notification for AMS after being on the floor for approx 2 days. Pt has profuse ecchymosis on R side of body and back, pt also appears jaundiced. Per EMS, SpO2 was 80% on RA, kelsea to 100% on NRB. Pt A+Ox0 (not answering any questions but awake/alert), PMH of GERD and alcoholism.

## 2023-09-14 NOTE — ED PROVIDER NOTE - PROGRESS NOTE DETAILS
CT head with small intracranial bleed in R frontal lobe.  Pt given keppra.  Discussed w Dr. Alejandro-- depressed mental status not likely due to this small bleed.  Recommend IV vitamin K 10mg and repeat CT head in 4-6hrs.    Labs consistent w hepatorenal syn and elevated ammonia concerning for hepatic encephalopathy.  Pt given lactulose rectally.  Duron placed.  BP improved after 2L saline.  Pt given ceftriaxone for possible UTI.  Will continue IV hydration, admit to MICU at [facility].

## 2023-09-14 NOTE — PROVIDER CONTACT NOTE (EICU) - ASSESSMENT
53 year old male with PMH as mention above presents with AMS found to have:   1. Right extra axial hematoma   2. Acute renal failure  3. Anemia   4. Thrombocytopenia   5. Hyponatremia   6. Hepatic encephalopathy

## 2023-09-14 NOTE — PROVIDER CONTACT NOTE (EICU) - BACKGROUND
Pt awake but confused moving all extremities   VS: 123/57, 90bpm, 24RR and spo2 97% on room air   No distress or increase work of breathing noted

## 2023-09-14 NOTE — H&P ADULT - NSICDXPASTMEDICALHX_GEN_ALL_CORE_FT
PAST MEDICAL HISTORY:  Cirrhosis     GERD (gastroesophageal reflux disease)     History of alcohol use disorder

## 2023-09-14 NOTE — PROVIDER CONTACT NOTE (EICU) - SITUATION
53 year old male with PMH of EOTH abuse and liver cirrhosis and chronic anemia was brought in by EMS for altered mental status.  Accompanying partner reports recent fall prompting CT brain.  Pt found to have right frontal extra-axial hematoma. Further work up revealed ARF creatinine 6.78, Na 118, ammonia 169, pos UA and anemia with hgb 7.3.

## 2023-09-14 NOTE — H&P ADULT - CRITICAL CARE ATTENDING COMMENT
S/p fall (unclear if mechanical) days ago, acute decompensated cirrhotic encephalopathy, c/b HRS, and severe sepsis, requiring mechanical ventilation. Broad spectrum abx; culture data pending. Levophed and albumin for HRS; will most likely need CVVHD. Lactulose until goal 4 to 6 BMs per 24 hours is reached. Serologic workup for cirrhosis. Critically ill with poor prognosis. Rest of plan as per above.

## 2023-09-14 NOTE — ED PROVIDER NOTE - CLINICAL SUMMARY MEDICAL DECISION MAKING FREE TEXT BOX
Patient with history of EtOH abuse and cirrhosis brought in by EMS for altered mental status with a mechanical fall last night.  On exam, patient is borderline hypotensive, with extensive bruising to left abdomen and flank.  Opens eyes and moves all extremities to command but not speaking.  Differential includes abdominal trauma, AAA, dissection, hepatic encephalopathy, other.  Plan for labs, CT head and C-spine, CTA chest/abdomen/pelvis, IV hydration, reassess. Patient with history of EtOH abuse and cirrhosis brought in by EMS for altered mental status with a mechanical fall last night.  On exam, patient is borderline hypotensive, with extensive bruising to left abdomen and flank.  Opens eyes and moves all extremities to command but not speaking.  Bedside FAST neg.  Differential includes acute intraabd abdominal trauma, AAA, hepatic encephalopathy, other.  Plan for labs, CT head and C-spine, CTA chest/abdomen/pelvis, IV hydration, reassess.  Pt scanned w contrast prior to labs for emergent evaluation of trauma in unstable coagulopathic patient.

## 2023-09-14 NOTE — CHART NOTE - NSCHARTNOTEFT_GEN_A_CORE
Patient required blood transfusion, spoke with family member Chau Santana and was given consent for transfusion after discussion of risk/benefits.

## 2023-09-14 NOTE — CONSULT NOTE ADULT - ASSESSMENT
54 yo M w/ hx of etoh abuse and cirrhosis admitted 9/14 for acute encephalopathy. Renal consulted for WILLIAM Cr 6.7 and Na 118    #Chronic severe Hyponatremia-?Symptomatic  Na 118 on admission  Andrei<20, UOsm 290 s/p 2L of NS  S.Osm 276  Na now 121 s/p 2L of NS  Etiology multifactorial. S.Osm close to normal range may suggest underlying osmotically active agent eg, alcohol (though level wnl). Low Andrei points towards hypovolemic hyponatremia, especially given improvement with NS    Recommend:  Even though near normal S.Osm, but given pt with etoh use and cirrhosis, these are very high risk features for ODS, so will err on the side of caution and prevent Na rise more than 8 meq/24 hours  Check BMP, Andrei, UOsm, Q4-6 hours  Also check serial S.Osm with above labs  Goal Na to not exceed 126 by 9/15 2 PM  In case overcorrecting, will give D5w to relower. If elaborating very dilute urine, may also need ddavp  Check TSH, AM Cortisol    #Non-oliguric WILLIAM  BCr not known  Cr on admission 6.7  UA w/ proteinuria, RBCs  Andrei <20  Silverman in place  Etiology likely pre-renal disease as low Andrei and improvement in Cr with NS. However, may have intra-renal process too given active UA (though some of the UA findings may be due to silverman). Also if Cr does not improve much, will have to consider HRS    Recommend:  Optimize BP. Keep MAP>70 for adequate renal perfusion  Renal US  Repeat UA and urine lytes in AM  BMP per icu protocol  Strict I&Os    Check UPCR

## 2023-09-15 NOTE — PROGRESS NOTE ADULT - SUBJECTIVE AND OBJECTIVE BOX
53M Hx of EtOH abuse and cirrhosis, anemia, and GERD requiring transfusion in the past, brought in by EMS for altered mental status. Patient's partner states that he had a mechanical fall last night, afterwards told her not to call EMS to take him to the hospital.  Today, patient has been less responsive than usual, so she called EMS to bring him in.  Partner states that patient developed extensive bruising to left abdomen and flank 2 to 3 days ago, prior to last night's fall. Pt normally drinks 1 bottle of vodka daily but has been cutting back to a few shots. Unable to obtain ROS as patient somnolent.   Hospital course: 53M PMH of EtOH abuse and cirrhosis, anemia requiring transfusion in the past, and GERD, brought in by EMS for altered mental status. Patient's partner states that he had a mechanical fall nightprior, afterwards told her not to call EMS to take him to the hospital. On day of admission, patient was less responsive than usual, so she called EMS to bring him in.  Partner states that patient developed extensive bruising to left abdomen and flank 2 to 3 days ago, prior to the fall. Pt normally drinks 1 bottle of vodka daily but has been cutting back to a few shots. Admitted to MICU for intubation i/s/o AMS and airway protection. Found to have hepatorenal syndrome and likely hepatic encephalopathy 2/2 alcohol hepatitis and decompensated liver cirrhosis, a right frontal subdural vs epidural hematoma, WILLIAM with oliguria, and SIRS 2/2 aspiration PA vs SBP. On increasing Levophed drip.     INTERVAL HPI/OVERNIGHT EVENTS:  Minor bleeding from ET tube. Given protonix, 1 unit pRBC, 1 unit FFP, d/c Heparin.  Head CT showed no significant change in small right frontal subdural hematoma, increased opacification of paranasal sinuses.  CT abdomen notable for ascites and mild peripancreatic stranding; no retroperitoneal hematoma.  Sodium stable at 120.    SUBJECTIVE: Patient seen and examined at bedside. Intubated and sedated. On Levophed.    ROS: All negative except as listed above.    VITAL SIGNS:  ICU Vital Signs Last 24 Hrs  T(C): 35.7 (15 Sep 2023 11:00), Max: 36 (14 Sep 2023 22:34)  T(F): 96.3 (15 Sep 2023 11:00), Max: 96.8 (14 Sep 2023 22:34)  HR: 84 (15 Sep 2023 13:00) (78 - 104)  BP: 103/55 (15 Sep 2023 13:00) (90/44 - 150/106)  BP(mean): 75 (15 Sep 2023 13:00) (60 - 125)  RR: 15 (15 Sep 2023 13:00) (8 - 34)  SpO2: 99% (15 Sep 2023 13:00) (93% - 100%)    O2 Parameters below as of 15 Sep 2023 13:00  Patient On (Oxygen Delivery Method): ventilator    O2 Concentration (%): 40    Mode: AC/ CMV (Assist Control/ Continuous Mandatory Ventilation), RR (machine): 16, TV (machine): 450, FiO2: 37, PEEP: 5, ITime: 1, MAP: 8.3, PIP: 18  Plateau pressure:   P/F ratio:     09-14 @ 07:01  -  09-15 @ 07:00  --------------------------------------------------------  IN: 1358.8 mL / OUT: 230 mL / NET: 1128.8 mL    09-15 @ 07:01  -  09-15 @ 13:35  --------------------------------------------------------  IN: 612 mL / OUT: 0 mL / NET: 612 mL    CAPILLARY BLOOD GLUCOSE    POCT Blood Glucose.: 114 mg/dL (14 Sep 2023 13:45)    ECG: reviewed.    PHYSICAL EXAM:    GENERAL: ill appearing, jaundiced.  HEAD: Normocephalic, some bruising on scalp.  EYES: icteric sclera.  ENT: dry, bloody mucous membrane.  NECK: Supple, + JVD, right > left.  CHEST/LUNG: Clear to auscultation bilaterally; No rales, rhonchi, wheezing or rubs. Unlabored respirations.  HEART: Regular rate and rhythm; No murmurs, rubs, or gallops.  ABDOMEN: Reduced bowel sounds, abdomen large and distended but soft. No guarding or rigidity. Tachypneic on abdominal exam (i.e. tenderness).  EXTREMITIES: 2+ Peripheral Pulses, brisk capillary refill. No clubbing, cyanosis, or edema. Warm extremities, except for cool feet.  NERVOUS SYSTEM: Patient is intubated and sedated.  SKIN: diffuse jaundice throughout body, large ecchymosis on L posterior thorax.    MEDICATIONS:  MEDICATIONS  (STANDING):  albumin human 25% IVPB 100 milliLiter(s) IV Intermittent every 6 hours  cefTRIAXone   IVPB 2000 milliGRAM(s) IV Intermittent every 24 hours  chlorhexidine 0.12% Liquid 15 milliLiter(s) Oral Mucosa every 12 hours  chlorhexidine 2% Cloths 1 Application(s) Topical <User Schedule>  CRRT Treatment    <Continuous>  fentaNYL   Infusion. 0.5 MICROgram(s)/kG/Hr (4.4 mL/Hr) IV Continuous <Continuous>  folic acid 1 milliGRAM(s) Oral daily  heparin   Injectable 5000 Unit(s) SubCutaneous every 8 hours  lactulose Retention Enema 200 Gram(s) Rectal every 24 hours  lactulose Syrup 60 Gram(s) Oral every 4 hours  multivitamin 1 Tablet(s) Oral daily  norepinephrine Infusion 0.05 MICROgram(s)/kG/Min (8.25 mL/Hr) IV Continuous <Continuous>  pantoprazole  Injectable 40 milliGRAM(s) IV Push every 12 hours  prednisoLONE    3 mG/mL Solution (ORAPRED) 40 milliGRAM(s) Oral daily  propofol Infusion 10 MICROgram(s)/kG/Min (5.28 mL/Hr) IV Continuous <Continuous>  propofol Injectable 15 milliGRAM(s) IV Push once  PureFlow Dialysate RFP-400 (K 2 / Ca 3) 5000 milliLiter(s) (1300 mL/Hr) CRRT <Continuous>  rifAXIMin 550 milliGRAM(s) Oral two times a day  thiamine IVPB 500 milliGRAM(s) IV Intermittent daily    MEDICATIONS  (PRN):    ALLERGIES:  No Known Allergies    LABS:                        7.4    17.24 )-----------( 147      ( 15 Sep 2023 10:43 )             21.1     09-15    121<L>  |  87<L>  |  56<H>  ----------------------------<  124<H>  5.0   |  15<L>  |  6.32<H>    Ca    8.3<L>      15 Sep 2023 10:43  Phos  7.3     09-15  Mg     2.2     09-15    TPro  5.7<L>  /  Alb  2.9<L>  /  TBili  28.3<H>  /  DBili  see note  /  AST  119<H>  /  ALT  43  /  AlkPhos  153<H>  09-15    PT/INR - ( 15 Sep 2023 10:43 )   PT: 24.0 sec;   INR: 2.15     PTT - ( 15 Sep 2023 10:43 )  PTT:51.4 sec    Urinalysis Basic - ( 15 Sep 2023 10:43 )  Color: x / Appearance: x / SG: x / pH: x  Gluc: 124 mg/dL / Ketone: x  / Bili: x / Urobili: x   Blood: x / Protein: x / Nitrite: x   Leuk Esterase: x / RBC: x / WBC x   Sq Epi: x / Non Sq Epi: x / Bacteria: x    ABG:  pH, Arterial: 7.37 (09-14-23 @ 20:44)  pCO2, Arterial: 25 mmHg (09-14-23 @ 20:44)  pO2, Arterial: 87 mmHg (09-14-23 @ 20:44)  pH, Arterial: 7.34 (09-14-23 @ 19:13)  pCO2, Arterial: 27 mmHg (09-14-23 @ 19:13)  pO2, Arterial: 114 mmHg (09-14-23 @ 19:13)    vBG:  pH, Venous: 7.39 (09-14-23 @ 14:12)  pCO2, Venous: 28 mmHg (09-14-23 @ 14:12)  pO2, Venous: 45 mmHg (09-14-23 @ 14:12)    Micro:    Culture - Blood (collected 09-14-23 @ 19:17)  Source: .Blood Blood  Preliminary Report (09-15-23 @ 08:01):    No growth at 12 hours    Culture - Blood (collected 09-14-23 @ 19:17)  Source: .Blood Blood  Preliminary Report (09-15-23 @ 08:01):    No growth at 12 hours    Urinalysis with Rflx Culture (collected 09-14-23 @ 18:35)    RADIOLOGY & ADDITIONAL TESTS: Reviewed.

## 2023-09-15 NOTE — CHART NOTE - NSCHARTNOTEFT_GEN_A_CORE
ETT retracted 3 cm  OGT advanced  Dr. Dahl aware  Repeat CXr ordered s/p HD cath placement and tube adjustments

## 2023-09-15 NOTE — CONSULT NOTE ADULT - SUBJECTIVE AND OBJECTIVE BOX
CHIEF COMPLAINT/ REASON FOR CONSULTATION:      HPI:  CC: AMS  HPI: 53M Hx of EtOH abuse and cirrhosis, anemia, and GERD requiring transfusion in the past, brought in by EMS for altered mental status.  Patient's partner states that he had a mechanical fall last night, afterwards told her not to call EMS to take him to the hospital.  Today, patient has been less responsive than usual, so she called EMS to bring him in.  Partner states that patient developed extensive bruising to left abdomen and flank 2 to 3 days ago, prior to last night's fall. Pt normally drinks 1 bottle of vodka daily but has been cutting back to a few shots. Unable to obtain ROS as patient somnolent.      In the ED:    - VS: T HR 90 BP 97-38 RR 20 -100% 12L Nonrebreather    - Pertinent Labs: Hb 12.6, Hb 7.3, plt 115, INR 2.56, Na 118, Cl 85, bicarb 18, BUN 55, Cr 6.78, Tbili >25, direct bili >16, Alk felecia 173, , ALT 46, ammonia 169, lipase 265, BNP 2505, UA moderate LE, + nitrite, WBC 21, RBC 32    - Imaging: CXR: CT head: extra-axial hematoma right frontal region  CTA chest/a/p: No dissection/aneurysm, hepatic steatosis with hepatomegaly and signs of portal hypertension, borderline splenomegaly, trace ascites  EKG:     - Treatment/interventions: lactulose 20mg x1, keppra 1g, phytonadione 10mg x1, 3L NS    PMHx: EtOH abuse and cirrhosis, anemia, and GERD   PSHx:   Meds: See med rec  Allergies: NKDA  Social: see below   (14 Sep 2023 19:54)    Neurosurgery consulted for findings on CTH of extra-axial hematoma concern for SDH vs EDH         MEDICATIONS:  Antibiotics:  cefTRIAXone   IVPB 2000 milliGRAM(s) IV Intermittent every 24 hours  rifAXIMin 550 milliGRAM(s) Oral two times a day    Neuro:  fentaNYL   Infusion. 0.5 MICROgram(s)/kG/Hr IV Continuous <Continuous>  fentaNYL   Infusion... 0.5 MICROgram(s)/kG/Hr IV Continuous <Continuous>  propofol Infusion 10 MICROgram(s)/kG/Min IV Continuous <Continuous>  propofol Injectable 15 milliGRAM(s) IV Push once    Anticoagulation:    Other:  albumin human 25% IVPB 100 milliLiter(s) IV Intermittent every 6 hours  folic acid 1 milliGRAM(s) Oral daily  multivitamin 1 Tablet(s) Oral daily  norepinephrine Infusion 0.05 MICROgram(s)/kG/Min IV Continuous <Continuous>  pantoprazole  Injectable 40 milliGRAM(s) IV Push every 12 hours  prednisoLONE    3 mG/mL Solution (ORAPRED) 40 milliGRAM(s) Oral daily  thiamine IVPB 500 milliGRAM(s) IV Intermittent daily      SOCIAL HISTORY:   Occupation:   Marital Status:     FAMILY HISTORY:      REVIEW OF SYSTEMS: unable to obtain d/t altered mentation   General:  Eyes:  ENT:  Cardiac:  Respiratory:  GI:  Musculoskeletal:   Skin:  Neurologic:   Psychiatric:     PHYSICAL EXAMINATION:   T(C): 36 (09-15-23 @ 01:06), Max: 36 (09-14-23 @ 22:34)  HR: 86 (09-15-23 @ 01:00) (78 - 104)  BP: 99/51 (09-15-23 @ 01:00) (90/44 - 150/106)  RR: 16 (09-15-23 @ 01:00) (13 - 32)  SpO2: 96% (09-15-23 @ 01:00) (94% - 100%)  Wt(kg): --Height (cm): 172.7 (09-14 @ 20:12)  Weight (kg): 88 (09-14 @ 18:36)     PHYSICAL EXAM:  GENERAL: ill appearing, intubated, sedation held for exam   HEAD:  Atraumatic   EYES: icteric sclera  ENT: dry mucous membrane  NECK: Supple, No JVD  CHEST/LUNG: Clear to auscultation bilaterally; No rales, rhonchi, wheezing or rubs. Unlabored respirations  HEART: Regular rate and rhythm; No murmurs, rubs, or gallops  ABDOMEN: Bowel sounds present; large and distended but soft and non-tender. No guarding or rigidity    EXTREMITIES:  2+ Peripheral Pulses, brisk capillary refill. No clubbing, cyanosis, or edema  NERVOUS SYSTEM:  with sedation held patient does not open eyes, does not follow commands, pupils 2 mm reactive, does not track, +cough, corneals,  no withdrawal to deep noxious stimuli  SKIN: diffuse jaundice throughout body      LABS:                        7.0    14.64 )-----------( 122      ( 14 Sep 2023 21:48 )             19.0     09-14    120<LL>  |  86<L>  |  53<H>  ----------------------------<  120<H>  4.5   |  16<L>  |  5.87<H>    Ca    7.8<L>      14 Sep 2023 23:28  Phos  5.4     09-14< from: CT Head No Cont (09.14.23 @ 14:32) >  IMPRESSION: Extra-axial hematoma involving the right frontal region.    < end of copied text >    Mg     2.3     09-14    TPro  5.1<L>  /  Alb  2.1<L>  /  TBili  27.3<H>  /  DBili  x   /  AST  119<H>  /  ALT  41  /  AlkPhos  157<H>  09-14    PT/INR - ( 14 Sep 2023 23:28 )   PT: 26.3 sec;   INR: 2.37          PTT - ( 14 Sep 2023 23:28 )  PTT:51.4 sec  Urinalysis Basic - ( 14 Sep 2023 23:28 )    Color: x / Appearance: x / SG: x / pH: x  Gluc: 120 mg/dL / Ketone: x  / Bili: x / Urobili: x   Blood: x / Protein: x / Nitrite: x   Leuk Esterase: x / RBC: x / WBC x   Sq Epi: x / Non Sq Epi: x / Bacteria: x        RADIOLOGY & ADDITIONAL STUDIES:

## 2023-09-15 NOTE — DIETITIAN INITIAL EVALUATION ADULT - OTHER CALCULATIONS
Ideal body weight used to calculate energy needs due to pt's current body weight > 120% ideal body weight. Adjusted for critical illness  Ideal body weight used to calculate energy needs due to pt's current body weight > 120% ideal body weight. Adjusted for critical illness, cvvhd, BMI

## 2023-09-15 NOTE — PROGRESS NOTE ADULT - SUBJECTIVE AND OBJECTIVE BOX
Patient is a 53y Male seen and evaluated at bedside. Intubated, sedated, now anuric, with worsening kidney function. Discussed with Primary team to initiate CVVHD for clearance.       Meds:    albumin human 25% IVPB 100 every 6 hours  cefTRIAXone   IVPB 2000 every 24 hours  chlorhexidine 0.12% Liquid 15 every 12 hours  chlorhexidine 2% Cloths 1 <User Schedule>  CRRT Treatment  <Continuous>  cyanocobalamin Injectable 1000 every 24 hours  fentaNYL   Infusion. 0.5 <Continuous>  folic acid 1 daily  lactulose Retention Enema 200 every 24 hours  lactulose Syrup 60 every 4 hours  levETIRAcetam  IVPB 500 every 12 hours  multivitamin 1 daily  mupirocin 2% Nasal 1 two times a day  norepinephrine Infusion 0.05 <Continuous>  oxymetazoline 0.05% Nasal Spray 1 two times a day  pantoprazole  Injectable 40 every 12 hours  prednisoLONE    3 mG/mL Solution (ORAPRED) 40 daily  propofol Infusion 10 <Continuous>  propofol Injectable 15 once  PureFlow Dialysate RFP-400 (K 2 / Ca 3) 5000 <Continuous>  rifAXIMin 550 two times a day  thiamine IVPB 500 daily      T(C): , Max: 36.1 (09-15-23 @ 14:00)  T(F): , Max: 97 (09-15-23 @ 14:00)  HR: 95 (09-15-23 @ 16:00)  BP: 95/54 (09-15-23 @ 16:00)  BP(mean): 70 (09-15-23 @ 16:00)  RR: 16 (09-15-23 @ 16:00)  SpO2: 100% (09-15-23 @ 16:00)  Wt(kg): --    09-14 @ 07:01  -  09-15 @ 07:00  --------------------------------------------------------  IN: 1358.8 mL / OUT: 230 mL / NET: 1128.8 mL    09-15 @ 07:01  -  09-15 @ 16:51  --------------------------------------------------------  IN: 897.6 mL / OUT: 0 mL / NET: 897.6 mL      Height (cm): 172.7 (09-14 @ 20:12)  Weight (kg): 88 (09-14 @ 18:36)  BMI (kg/m2): 29.5 (09-14 @ 20:12)  BSA (m2): 2.02 (09-14 @ 20:12)    Review of Systems:  ROS negative except as per HPI      PHYSICAL EXAM:  GENERAL: ill appearing, jaundiced, Intubated   HEENT:  icteric sclera, dry, bloody mucous membrane.  CHEST/LUNG: Clear to auscultation bilaterally; No rales, rhonchi, wheezing or rubs. Unlabored respirations on MV   HEART: Regular rate and rhythm; No murmurs, rubs, or gallops.  ABDOMEN: abdomen large and distended but soft. No guarding or rigidity. Tachypneic on abdominal exam   EXTREMITIES: . No clubbing, cyanosis, or edema   NERVOUS SYSTEM:  sedated.  SKIN: diffuse jaundice  ecchymosis on L posterior thorax.      LABS:                        7.4    17.24 )-----------( 147      ( 15 Sep 2023 10:43 )             21.1     09-15    121<L>  |  87<L>  |  56<H>  ----------------------------<  124<H>  5.0   |  15<L>  |  6.32<H>    Ca    8.3<L>      15 Sep 2023 10:43  Phos  7.3     09-15  Mg     2.2     09-15    TPro  5.7<L>  /  Alb  2.9<L>  /  TBili  28.3<H>  /  DBili  see note  /  AST  119<H>  /  ALT  43  /  AlkPhos  153<H>  09-15    Hepatitis B Surface Antibody: Reactive *!* (09-14 @ 21:48)  Hepatitis C Virus S/CO Ratio: 0.04 S/CO (09-14 @ 17:45)    PT/INR - ( 15 Sep 2023 10:43 )   PT: 24.0 sec;   INR: 2.15          PTT - ( 15 Sep 2023 10:43 )  PTT:51.4 sec  Urinalysis Basic - ( 15 Sep 2023 10:43 )    Color: x / Appearance: x / SG: x / pH: x  Gluc: 124 mg/dL / Ketone: x  / Bili: x / Urobili: x   Blood: x / Protein: x / Nitrite: x   Leuk Esterase: x / RBC: x / WBC x   Sq Epi: x / Non Sq Epi: x / Bacteria: x      Osmolality, Random Urine: 290 mosm/kg (09-14 @ 18:35)  Sodium, Random Urine: <20 mmol/L (09-14 @ 18:35)        RADIOLOGY & ADDITIONAL STUDIES:

## 2023-09-15 NOTE — CHART NOTE - NSCHARTNOTEFT_GEN_A_CORE
Anticoagulation at any time has a risk of causing worsening subdural bleed and brain hemorrhage. A risk-benefit analysis must be performed prior to restarting AC. If risks outweigh the benefits, you may consider starting prophylactic SQL/SQH.    Discussed w/ Dr. Jackson

## 2023-09-15 NOTE — CONSULT NOTE ADULT - ATTENDING COMMENTS
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

## 2023-09-15 NOTE — DIETITIAN INITIAL EVALUATION ADULT - NSFNSGIIOFT_GEN_A_CORE
09-14-23 @ 07:01  -  09-15-23 @ 07:00  --------------------------------------------------------  OUT:    Nasogastric/Oral tube (mL): 200 mL  Total OUT: 200 mL    Total NET: -200 mL

## 2023-09-15 NOTE — CONSULT NOTE ADULT - SUBJECTIVE AND OBJECTIVE BOX
OTOLARYNGOLOGY (ENT) CONSULTATION NOTE    PATIENT: DEON VARGAS     MRN: [id number]       : 66  DATE OF ADMISSION:23  DATE OF SERVICE:  09-15-23 @ 14:26    CHIEF COMPLAINT: oral trauma post intubation     HISTORY OF PRESENT ILLNESS:  DEON VARGAS  is a 53 year old male with hepatic chirosis, alcoholism who presented with AMS, admitted for presumed hepatic encephalopathy, coagulapathy hepatorenal syndrome to the MICU for complex medical management. CTH for report of fall is concerning for R extra-axial hematoma.  Patient s/p traumatic intubation, patient noted to have a cuff leak the day after- now s/p another intubation that was slightly traumatic. Glide scope noted to have slight oozing during intubation.  Patient not currently on any anticoagulation No epistaxis reported.       PAST MEDICAL HISTORY:  Cirrhosis  History of alcohol use disorder  GERD (gastroesophageal reflux disease)      CURRENT MEDICATIONS   albumin human 25% IVPB 100 IV Intermittent  cefTRIAXone   IVPB 2000 IV Intermittent  chlorhexidine 0.12% Liquid 15 Oral Mucosa  chlorhexidine 2% Cloths 1 Topical  CRRT Treatment    fentaNYL   Infusion. 0.5 IV Continuous  folic acid 1 Oral  heparin   Injectable 5000 SubCutaneous  lactulose Retention Enema 200 Rectal  lactulose Syrup 60 Oral  multivitamin 1 Oral  norepinephrine Infusion 0.05 IV Continuous  pantoprazole  Injectable 40 IV Push  prednisoLONE    3 mG/mL Solution (ORAPRED) 40 Oral  propofol Infusion 10 IV Continuous  propofol Injectable 15 IV Push  PureFlow Dialysate RFP-400 (K 2 / Ca 3) 5000 CRRT  rifAXIMin 550 Oral  thiamine IVPB 500 IV Intermittent      HOME MEDICATIONS:    ALLERGIES:  No Known Allergies    SOCIAL HISTORY: Pertinent included in HPI   FAMILY HISTORY      REVIEW OF SYSTEMS: The patient was asked and responded to a review of systems regarding the following symptoms: constitutional, eye, ears, nose, mouth, throat, cardiovascular, respiratory. Pertinent factors have been included in the HPI.     PHYSICAL EXAMINATION:  The pertinent positive and negative examination findings were:    GENERAL: ill appearing, intubated, sedation held for exam   HEAD:  Atraumatic   EYES: icteric sclera  ENT: dry mucous membrane, orally intubated, oral city with dried blood and clots, mild bleed likely from posterior pharynx. unable to visualize site. Limited view due to ETT and patients cooperation. Oral pack placed , suctioned right nasal cavity causing a small anterior epistaxis that was controlled.   Nares: dried blood b/l nasal cavity   NECK: Supple,   CHEST/LUNG: on vent   ABDOMEN: large and distended but soft and non-tender.  NERVOUS SYSTEM:  with sedation held patient does not open eyes, does not follow commands, pupils 2 mm reactive, does not track,   SKIN: diffuse jaundice throughout body      Vital Signs:  T(C): 35.7 (09-15-23 @ 11:00), Max: 36 (23 @ 22:34)  HR: 84 (09-15-23 @ 13:00) (78 - 104)  BP: 103/55 (09-15-23 @ 13:00) (90/44 - 150/106)  RR: 15 (09-15-23 @ 13:00) (8 - 34)  SpO2: 99% (09-15-23 @ 13:00) (93% - 100%)                        7.4    17.24 )-----------( 147      ( 15 Sep 2023 10:43 )             21.1    09-15    121<L>  |  87<L>  |  56<H>  ----------------------------<  124<H>  5.0   |  15<L>  |  6.32<H>    Ca    8.3<L>      15 Sep 2023 10:43  Phos  7.3     09-15  Mg     2.2     09-15    TPro  5.7<L>  /  Alb  2.9<L>  /  TBili  28.3<H>  /  DBili  see note  /  AST  119<H>  /  ALT  43  /  AlkPhos  153<H>  09-15   PT/INR - ( 15 Sep 2023 10:43 )   PT: 24.0 sec;   INR: 2.15          PTT - ( 15 Sep 2023 10:43 )  PTT:51.4 [id number]    MICROBIOLOGY:    Culture - Blood (collected 23 @ 19:17)  Source: .Blood Blood  Preliminary Report (09-15-23 @ 08:01):    No growth at 12 hours    Culture - Blood (collected 23 @ 19:17)  Source: .Blood Blood  Preliminary Report (09-15-23 @ 08:01):    No growth at 12 hours    Culture - Urine (collected 23 @ 18:35)  Source: Catheterized None  Preliminary Report (09-15-23 @ 08:11):    No growth to date    Urinalysis with Rflx Culture (collected 23 @ 18:35)

## 2023-09-15 NOTE — PROGRESS NOTE ADULT - ASSESSMENT
53Y M now with anuric WILLIAM likley 2/2 HRS now requiring CVVHD for clearance      #Chronic severe Hyponatremia  Na 118 on admission, this am at 121   Andrei<20, UOsm 290 s/p 2L of NS  S.Osm 276  Etiology multifactorial, given normal sOsm uncertain if true hypotonic hyponatremia, will need further repeats if continues to stay with in normal limits okay to allow for faster correction     Recommend:  Even though near normal S.Osm, but given pt with etoh use and cirrhosis, these are very high risk features for ODS, so will err on the side of caution and prevent Na rise more than 8 meq/24 hours  Check BMP, Andrei, UOsm, Q4-6 hours  Also check serial S.Osm with above labs  Goal Na to not exceed 130 by 9/16 2 PM  In case overcorrecting, will give D5w to relower. If elaborating very dilute urine, may also need ddavp      Anuric WILLIAM 2/2 HRS now requiring CVVHD   BCr not known  Cr on admission 6.32        Recommend:  Optimize BP. Keep MAP>70 for adequate renal perfusion  Continue with Levo, Albumin   Repeat UA and urine lytes in AM  BMP per icu protocol  Strict I&Os  CVVHD as below for clearance   CRRT Treatment:   Modality: CVVHD, Filter: NxStage CAR-505, Target Blood Flow: 200 mL/Min  Target Fluid Balance: No fluid removal      53Y M now with anuric WILLIAM likley 2/2 HRS now requiring CVVHD for clearance      #Chronic severe Hyponatremia  Na 118 on admission, this am at 121   Andrei<20, UOsm 290 s/p 2L of NS  S.Osm 276  Etiology multifactorial, given normal sOsm uncertain if true hypotonic hyponatremia, will need further repeats if continues to stay with in normal limits okay to allow for faster correction     Recommend:  Even though near normal S.Osm, but given pt with etoh use and cirrhosis, these are very high risk features for ODS, so will err on the side of caution and prevent Na rise more than 8 meq/24 hours  Check BMP, Andrei, UOsm, Q4-6 hours  Also check serial S.Osm with above labs  Goal Na to not exceed 130 by 9/16 2 PM  In case overcorrecting, will give D5w to relower. If becomes  polyuric , may also need ddavp      Anuric WILLIAM 2/2 HRS now requiring CVVHD   BCr not known  Cr on admission 6.32        Recommend:  Optimize BP. Keep MAP>70 for adequate renal perfusion  Continue with Levo, Albumin   Repeat UA and urine lytes in AM  BMP per icu protocol  Strict I&Os  CVVHD as below for clearance   CRRT Treatment:   Modality: CVVHD, Filter: NxStage CAR-505, Target Blood Flow: 200 mL/Min  Target Fluid Balance: No fluid removal

## 2023-09-15 NOTE — DIETITIAN INITIAL EVALUATION ADULT - PERTINENT MEDS FT
MEDICATIONS  (STANDING):  albumin human 25% IVPB 100 milliLiter(s) IV Intermittent every 6 hours  cefTRIAXone   IVPB 2000 milliGRAM(s) IV Intermittent every 24 hours  chlorhexidine 0.12% Liquid 15 milliLiter(s) Oral Mucosa every 12 hours  chlorhexidine 2% Cloths 1 Application(s) Topical <User Schedule>  CRRT Treatment    <Continuous>  cyanocobalamin Injectable 1000 MICROGram(s) IntraMuscular every 24 hours  fentaNYL   Infusion. 0.5 MICROgram(s)/kG/Hr (4.4 mL/Hr) IV Continuous <Continuous>  folic acid 1 milliGRAM(s) Oral daily  heparin   Injectable 5000 Unit(s) SubCutaneous every 8 hours  lactulose Retention Enema 200 Gram(s) Rectal every 24 hours  lactulose Syrup 60 Gram(s) Oral every 4 hours  levETIRAcetam  IVPB 500 milliGRAM(s) IV Intermittent every 12 hours  multivitamin 1 Tablet(s) Oral daily  norepinephrine Infusion 0.05 MICROgram(s)/kG/Min (8.25 mL/Hr) IV Continuous <Continuous>  pantoprazole  Injectable 40 milliGRAM(s) IV Push every 12 hours  prednisoLONE    3 mG/mL Solution (ORAPRED) 40 milliGRAM(s) Oral daily  propofol Infusion 10 MICROgram(s)/kG/Min (5.28 mL/Hr) IV Continuous <Continuous>  propofol Injectable 15 milliGRAM(s) IV Push once  PureFlow Dialysate RFP-400 (K 2 / Ca 3) 5000 milliLiter(s) (1300 mL/Hr) CRRT <Continuous>  rifAXIMin 550 milliGRAM(s) Oral two times a day  thiamine IVPB 500 milliGRAM(s) IV Intermittent daily    MEDICATIONS  (PRN):

## 2023-09-15 NOTE — AIRWAY PLACEMENT NOTE ADULT - POST AIRWAY PLACEMENT ASSESSMENT:
breath sounds bilateral/positive end tidal CO2 noted/chest excursion noted
breath sounds bilateral/breath sounds equal/positive end tidal CO2 noted/CXR pending/chest excursion noted

## 2023-09-15 NOTE — DIETITIAN INITIAL EVALUATION ADULT - OTHER INFO
53M Hx of EtOH abuse and cirrhosis, anemia, and GERD requiring transfusion in the past, brought in by EMS for altered mental status. Intubated i/s/o AMS and airway protection. Admitted to MICU for further interventions.     Attempted to see pt x2 today however undergoing procedures at bedside. Defer assessment to EMR however limited nutrition assessment. Currently sedated on propofol. Fentanyl gtt, on one pressor (norepinephrine). Currently NPO. No wt hx to review per chart. Skin with ecchymosis, no PUs documented; no edema documented; partha scale 7. No nvdc noted; LBM today. Aggressive bowel regimen ordered. Plan for cvvhd. Nutrition related labs reviewed; low Na (121); high BUN (56); hig Cr (6.32); phosph (7.3). Recommend hold off feeds until further resuscitation. Receives daily multivitamin, folic acid, thiamine. Please view full recs below. RD to follow

## 2023-09-15 NOTE — CONSULT NOTE ADULT - SUBJECTIVE AND OBJECTIVE BOX
HPI:  53M h/o EtOH cirrhosis, anemia, GERD p/w AMS (s/p intubation) and acute renal failure.     Per chart review, pt fell 1 day PTA and subsequently became less responsive thus his partner called EMS. PMH regarding cirrhosis unclear. Daily drinker though has cut back from 1 vodka per day to a few shots per day.    CT head showed a small hematoma, CT A/P with hepatomegaly, trace ascites, and patent hepatic/portal veins. Labs notable for Hgb 7.3, Na 116, Cr 5.4, INR 2.5, and Tbili 29.4. No melena or hematochezia though pt is bleeding from oropharynx. Now anuric and being started on dialysis per primary team.    Allergies  No Known Allergies    Intolerances    Home Medications:    MEDICATIONS:  MEDICATIONS  (STANDING):  albumin human 25% IVPB 100 milliLiter(s) IV Intermittent every 6 hours  cefTRIAXone   IVPB 2000 milliGRAM(s) IV Intermittent every 24 hours  chlorhexidine 0.12% Liquid 15 milliLiter(s) Oral Mucosa every 12 hours  chlorhexidine 2% Cloths 1 Application(s) Topical <User Schedule>  CRRT Treatment    <Continuous>  cyanocobalamin Injectable 1000 MICROGram(s) IntraMuscular every 24 hours  fentaNYL   Infusion. 0.5 MICROgram(s)/kG/Hr (4.4 mL/Hr) IV Continuous <Continuous>  folic acid 1 milliGRAM(s) Oral daily  lactulose Retention Enema 200 Gram(s) Rectal every 24 hours  lactulose Syrup 60 Gram(s) Oral every 4 hours  levETIRAcetam  IVPB 500 milliGRAM(s) IV Intermittent every 12 hours  multivitamin 1 Tablet(s) Oral daily  mupirocin 2% Nasal 1 Application(s) Both Nostrils two times a day  norepinephrine Infusion 0.05 MICROgram(s)/kG/Min (8.25 mL/Hr) IV Continuous <Continuous>  oxymetazoline 0.05% Nasal Spray 1 Spray(s) Both Nostrils two times a day  pantoprazole  Injectable 40 milliGRAM(s) IV Push every 12 hours  prednisoLONE    3 mG/mL Solution (ORAPRED) 40 milliGRAM(s) Oral daily  propofol Infusion 10 MICROgram(s)/kG/Min (5.28 mL/Hr) IV Continuous <Continuous>  propofol Injectable 15 milliGRAM(s) IV Push once  PureFlow Dialysate RFP-400 (K 2 / Ca 3) 5000 milliLiter(s) (1300 mL/Hr) CRRT <Continuous>  rifAXIMin 550 milliGRAM(s) Oral two times a day  thiamine IVPB 500 milliGRAM(s) IV Intermittent daily    MEDICATIONS  (PRN):    PAST MEDICAL & SURGICAL HISTORY:  Cirrhosis  History of alcohol use disorder  GERD (gastroesophageal reflux disease)    Vital Signs Last 24 Hrs  T(C): 36.1 (15 Sep 2023 14:00), Max: 36.1 (15 Sep 2023 14:00)  T(F): 97 (15 Sep 2023 14:00), Max: 97 (15 Sep 2023 14:00)  HR: 90 (15 Sep 2023 16:54) (78 - 104)  BP: 95/54 (15 Sep 2023 16:00) (90/44 - 127/64)  BP(mean): 70 (15 Sep 2023 16:00) (60 - 89)  RR: 16 (15 Sep 2023 16:54) (8 - 34)  SpO2: 94% (15 Sep 2023 16:54) (93% - 100%)    Parameters below as of 15 Sep 2023 16:54  Patient On (Oxygen Delivery Method): ventilator    O2 Concentration (%): 40    09-14 @ 07:01  -  09-15 @ 07:00  --------------------------------------------------------  IN: 1358.8 mL / OUT: 230 mL / NET: 1128.8 mL    09-15 @ 07:01  -  09-15 @ 17:44  --------------------------------------------------------  IN: 897.6 mL / OUT: 0 mL / NET: 897.6 mL      Mode: AC/ CMV (Assist Control/ Continuous Mandatory Ventilation)  RR (machine): 16  TV (machine): 450  FiO2: 40  PEEP: 5  ITime: 1  MAP: 8.1  PIP: 18    PHYSICAL EXAM:  General: intubated, sedated  Lungs: bilateral chest rise  Abdomen: Nondistended, soft, nontender, No rebound or guarding  Extremities: No edema  Neurological: Moving all extremities spontaneously    LABS:                        7.4    17.24 )-----------( 147      ( 15 Sep 2023 10:43 )             21.1     09-15    x   |  x   |  x   ----------------------------<  182<H>  x    |  x   |  x     Ca    8.3<L>      15 Sep 2023 10:43  Phos  7.3     09-15  Mg     2.2     09-15    TPro  5.7<L>  /  Alb  2.9<L>  /  TBili  28.3<H>  /  DBili  see note  /  AST  119<H>  /  ALT  43  /  AlkPhos  153<H>  09-15      Culture Results:   No growth at 12 hours (09-14 @ 19:17)  Culture Results:   No growth at 12 hours (09-14 @ 19:17)  Culture Results:   No growth to date (09-14 @ 18:35)    PT/INR - ( 15 Sep 2023 10:43 )   PT: 24.0 sec;   INR: 2.15     PTT - ( 15 Sep 2023 10:43 )  PTT:51.4 sec    Culture - Sputum (collected 15 Sep 2023 15:26)  Source: ET Tube ET Tube  Gram Stain (15 Sep 2023 17:01):    No epithelial cells seen    Few WBC's    No organisms seen    Culture - Blood (collected 14 Sep 2023 19:17)  Source: .Blood Blood  Preliminary Report (15 Sep 2023 08:01):    No growth at 12 hours    Culture - Blood (collected 14 Sep 2023 19:17)  Source: .Blood Blood  Preliminary Report (15 Sep 2023 08:01):    No growth at 12 hours    Culture - Urine (collected 14 Sep 2023 18:35)  Source: Catheterized None  Preliminary Report (15 Sep 2023 08:11):    No growth to date    Urinalysis with Rflx Culture (collected 14 Sep 2023 18:35)    RADIOLOGY & ADDITIONAL STUDIES:   Reviewed.

## 2023-09-15 NOTE — DIETITIAN INITIAL EVALUATION ADULT - PERTINENT LABORATORY DATA
09-15    121<L>  |  87<L>  |  56<H>  ----------------------------<  124<H>  5.0   |  15<L>  |  6.32<H>    Ca    8.3<L>      15 Sep 2023 10:43  Phos  7.3     09-15  Mg     2.2     09-15    TPro  5.7<L>  /  Alb  2.9<L>  /  TBili  28.3<H>  /  DBili  see note  /  AST  119<H>  /  ALT  43  /  AlkPhos  153<H>  09-15

## 2023-09-15 NOTE — CONSULT NOTE ADULT - ASSESSMENT
53 year old male with hepatic chirosis, alcoholism who presented with AMS, admitted for presumed hepatic encephalopathy, coagulapathy hepatorenal syndrome to the MICU for complex medical management.  CTH for report of fall is concerning for R extra-axial hematoma.     - Please obtain short term follow up CTH when stable, ideally 4-6 hours from the first  - Please give Keppra 500mg BID  - Continue to correct coagulapathy  - Consider neurology consult for AMS      Case discussed with Dr. Margarito Mcguire and Dr. Jackson  53 year old male with hepatic chirosis, alcoholism who presented with AMS, admitted for presumed hepatic encephalopathy, coagulapathy hepatorenal syndrome to the MICU for complex medical management.  CTH for report of fall is concerning for R extra-axial hematoma.     - Please obtain short term follow up CTH when stable, ideally 4-6 hours from the first  - Please give Keppra 500mg BID  - Continue to correct coagulapathy  - Place EEG and consider neuro consult to workup AMS      Case discussed with Dr. Margarito Mcguire and Dr. Jackson

## 2023-09-15 NOTE — PROGRESS NOTE ADULT - ASSESSMENT
53M Hx of EtOH abuse and cirrhosis, anemia, and GERD requiring transfusion in the past, brought in by EMS for altered mental status now intubated and sedated pending repeat CTH and workup and management of hepatic encephalopathy and hepatorenal syndrome.    Neuro  #Acute metabolic encephalopathy  Intubated and sedated. Per wife AAOx3 at baseline. Likely in setting of hepatic encephalopathy  - SAT and SBT  - Start Lactulose 30mg q4h and rifaximin 550mg bid    #Intra-cranial bleed  CT head with extra-axial hematoma in right frontal lobe. ED at [facility] discussed with neurosurgery and suspect bleed to not be causing AMS. s/p vitamin K and keppra 1g  - FFP  - Repeat CT head and follow up with neurosurgery - DENNY at this time  - Cleared to start ppx anti-coag    Card  DENNY    Pulm  #Intubated  ET tube was found to have a leak today. Boogie was used to exchange ET tube.  - ENT consulted for bleeding in oropharynx      #Aspiration PNA  Found to be increasingly altered per wife and s/p unwitnessed fall yesterday. Received CTX 1g at [facility].  - Started CTX 2g daily 9/14  - follow up sputum culture  - SAT/SBT    GI  #Cirrhosis  #Hepatic encephalopathy  #?SBP  History of chronic alcohol use presenting with hepatic ecephalopathy, ammonia 169 --> with distended abdomen, soft and non-tender and diffusely jaundice. Bedside POCUS with small pockets  - CTX 2g daily for SBP ppx  - follow up hepatitis panel, HIV  - trend ammonia levels qd    #Elevated Tbili  Tbili >25, direct bili >16. CTA a/p: normal biliary duct/gallbladder but has hepatic steatosis with hepatomegaly, and portal hypertension  - Trend tbili  - Hapto/ LDH    Nephro  #WILLIAM vs WILLIAM on CKD  #Hepatorenal syndrome  BUN 55, Cr 6.78 suspect heptorenal syndrome in setting of portal hypertension with splachnic arterial vasodilation resulting in decreased renal perfusion  - Albumin 25% IVPB 100mg q6h for 3 days  - Norepinephrine with goal MAP 70 (10 above presenting MAP of 60)  - Nephrology recs  - Strict I/O  - Avoid nephrotoxic med and renally dose meds  - CK    #Hyponatremia  Na 118, received 3L NS in ED  - 3% NS @ 25cc/hr  - Repeat BMP q4h with goal Na 122 by 6PM 9/15  - If Na 121 or higher can give dose of DDAVP 1mcg (can give additional dose 6 hours after if needed)  - Urine Na and osm with BMP  - Nephro recs    #NAGMA  Bicarb 18, normal AG. Had episode of loose/mucous stool  - Trend bicarb and AG  - cdiff and GI PCR    Heme  #Anemia  Hb 7.3, .5 likely in setting of alcohol use disorder. History of hemorrhoids. Has ecchymosis on flank however no active bleed and known history of anemia. Partner denies history of esophageal varices. INR 2.5  - Vitamin b12, folic acid 1mg daily  - f/u serum b12 and folate  - Type & Screen  - Transfuse for Hb <7 or <8 with active bleed  - FFP 1 unit and repeat coags  - LDH and haptoglobin  - Iron profile    #Thrombocytopenia  Platelet 115, likely 2/2 alcohol use disorder  - Trend platelets  - Transfuse <10, <20 with fever or <50 with bleeding    ID  #Sepsis  Met 2/4 SIRS criteria (leukocytosis, tachycardic) with suspected asp PNA vs SBP  - blood, urine, sputum cultures  - continue CTX 2g daily  - f/u cdiff in setting of mucous/loose stool (may be 2/2 lactulose)    Fluids: s/p 3L NS  Electrolytes: Caution in setting of suspected WILLIAM  Nutrition: NPO  DVT ppx: Heparin 5000 q8h (prothrombotic state)  GI ppx: Protonix  Code: Full code  Dispo: MICU 53M Hx of EtOH abuse and cirrhosis, anemia requiring transfusion in the past, GERD, and a recent un-witnessed fall BIBEMS for altered mental status now intubated and sedated, admitted for further management of  hepatic encephalopathy and hepatorenal syndrome 2/2 alcohol hepatitis and decompensated liver cirrhosis (MELD 41), found to have right frontal hematoma (epidural vs subdural), and possible pancreatitis.    Neuro  #Hepatic encelphalopathy  Intubated and sedated. Per wife AAOx3 at baseline. AMS unlikely 2/2 SAH vs SDH seen on CT. Ammonia 169 on admission.  - Ammonia 152 today.  - SAT and SBT.  - On rifaximin, lactulose enema, and lactulose syrup via NGT.  - Monitor for increased stool output.  - Continue thiamine, folic acid.    #Intra-cranial bleed  CT head with extra-axial hematoma in right frontal lobe. ED at [facility] discussed with neurosurgery and suspect bleed to not be causing AMS. s/p vitamin K, keppra 1g, and FFP.  - Repeat CT head o/n: no significant change in the small right frontal subdural hematoma. Increased opacification of the paranasal sinuses likely due to intubation.  - Neurosurgery onboard: recommendations appreciated.  - Cleared to re-start Heparin.  - Continue to monitor.    Cardiovascular  On Levophed for Hepatorenal syndrome.  DENNY    Pulm  #Intubated, sedated  #Possible oropharyngeal blood  ET tube was found to have a leak today 9/15. Boogie was used to exchange ET tube. Findings of considerable blood in oropharynx requiring suctioning.   -On propofol, fentanyl drips.  -ENT consulted: recommendations appreciated.    #Aspiration PNA  Found to be increasingly altered per wife and s/p unwitnessed fall day prior to admission. Received CTX 1g at [facility].  - Continue CTX 2g daily (started 9/14)  - follow up sputum culture  - SAT/SBT    GI  #Decompensated Liver Cirrhosis  #Hepatic encephalopathy - see Neuro  #?SBP  #Alcohol Hepatitis  History of chronic alcohol use presenting with hepatic encephalopathy ammonia 169 -->159 today with distended abdomen, soft and non-tender and diffusely jaundice. Bedside POCUS with small pockets of fluid. Negative hepatitis panel, HIV.  - CTX 2g daily for SBP ppx  - Prednisolone for severe alcohol hepatitis  - Trend ammonia levels qd  - MELD score 41 today  - Maddrey score 200 today  - Child Ferguson score 13-15 (unable to assess mental status while sedated)  - Continue thiamine, folic acid  - Hepatology consulted for transplant: recommendations appreciated.    #Possible Pancreatitis - unlikely.  CT abdomen with some papito-pancreatic stranding. WBC increasing to 17 today. Pt had no abdominal pain on arrival to ED. Tachypneic to 30s-40s upon abdominal palpation. Lipase 265 on admission, improving to 133.   -Merna score 0 points  - low risk for mortality, unlikely to be severe pancreatitis.  -Re-assess at 48 hours (9/16 afternoon).  -Pt has hepatorenal syndrome - cannot give maintenance fluid although clinical suspicion is low at this time.  -On fentanyl drip    #Elevated T-bili  Tbili >25, direct bili >16on admission. CTA a/p: normal biliary duct/gallbladder but has hepatic steatosis with hepatomegaly, and portal hypertension.  - T bili increasing to 28.3 today  - Trend tbili  - LDH elevated at 338, Hapto WNL at 40.    Nephro  #WILLIAM vs WILLIAM on CKD with anuria  #Hepatorenal syndrome  BUN 55, Cr 6.78 suspect hepatorenal syndrome in setting of portal hypertension with splanchnic arterial vasodilation resulting in decreased renal perfusion. CK low at 24.  - UOP 25cc since admission.   - BUN/Cr 52/5.9 today. Phos 5.4. Mg WNL. Na 120.  - On Albumin 25% IVPB 100mg q6h for 3 days.  - Norepinephrine with goal MAP 70 (10 above presenting MAP of 60)  - Nephrology onboard: recommend CVVHD.  - Strict I/Os.  - Avoid nephrotoxic med and renally dose meds.    #Hyponatremia  Na 118 on admission, received 3L NS in ED. Started 3% NS @ 25cc/hr.  - Repeat BMP q4h with goal Na 122 by 6PM 9/15  - If Na 121 or higher can give dose of DDAVP 1mcg (can give additional dose 6 hours after if needed)  - Urine Na <20  - Urine osm 290 s/p 2L NS  - Serum osm 276  - Nephro onbaord: likely hypovolemic hyponatremia.    #NAGMA  Bicarb 18, normal AG. Pt met 2/4 SIRS criteria on admission. Had episode of loose/mucous stool.  - Trend bicarb and AG  -  Bicarb 14, AG normal today.  -  F/u C. difficile  - GI panel negative    Heme  #Anemia - stable.  Hb 7.3, .5 likely in setting of alcohol use disorder. History of hemorrhoids. Has ecchymosis on flank however no active bleed and known history of anemia. Partner denies history of esophageal varices. INR 2.5. S/p 1 unit pRBC and 1 unit FFP overnight, Hb 7.6 today.  - Continue Vitamin b12, folic acid 1mg daily.  - f/u serum b12 and folate.  - Type & Screen  - Transfuse for Hb <7 or <8 with active bleed  - LDH high, haptoglobin WNL  - Iron 89, TIBC low <17, ferritin elevated 3,417.   -PT/INR/PTT today: 24/2.15/51.4 although this is 2/2 liver dysfunction.    #Thrombocytopenia - stable.  Platelet 115, likely 2/2 alcohol use disorder. 123 today.  - Trend platelets  - Transfuse <10, <20 with fever or <50 with bleeding    ID  #Sepsis  Met 2/4 SIRS criteria (leukocytosis, tachycardic) with suspected asp PNA vs SBP. Blood and urine cultures negative at 12 hours.  - f/u sputum culture  - f/u blood, urine cultures at 48 hours  - continue CTX 2g daily  - f/u cdiff in setting of mucous/loose stool    Fluids: n/a  Electrolytes: Caution in setting of suspected WILLIAM  Nutrition: NPO  DVT ppx: Heparin 5000 q8h (prothrombotic state)  GI ppx: Protonix  Code: Full code  Dispo: MICU 53M Hx of EtOH abuse and cirrhosis, anemia requiring transfusion in the past, GERD, and a recent un-witnessed fall BIBEMS for altered mental status now intubated and sedated, admitted for further management of  hepatic encephalopathy and hepatorenal syndrome 2/2 alcohol hepatitis and decompensated liver cirrhosis (MELD 41), found to have right frontal hematoma (epidural vs subdural), and possible pancreatitis.    Neuro  #Hepatic encelphalopathy  Intubated and sedated. Per wife AAOx3 at baseline. AMS unlikely 2/2 SAH vs SDH seen on CT. Ammonia 169 on admission.  - Ammonia 152 today.  - SAT and SBT.  - On rifaximin, lactulose enema, and lactulose syrup via NGT.  - Monitor for increased stool output.  - Continue thiamine, folic acid.    #Intra-cranial bleed  CT head with extra-axial hematoma in right frontal lobe. ED at [facility] discussed with neurosurgery and suspect bleed to not be causing AMS. s/p vitamin K, keppra 1g, and FFP.  - Repeat CT head o/n: no significant change in the small right frontal subdural hematoma. Increased opacification of the paranasal sinuses likely due to intubation.  - Neurosurgery onboard: recommendations appreciated.  - Cleared to re-start Heparin.  - Continue to monitor.    Cardiovascular  On Levophed for Hepatorenal syndrome.  DENNY    Pulm  #Intubated, sedated  #Possible oropharyngeal blood  ET tube was found to have a leak today 9/15. Boogie was used to exchange ET tube. Findings of considerable blood in oropharynx requiring suctioning.   -On propofol, fentanyl drips.  -ENT consulted: recommendations appreciated.    #Aspiration PNA  Found to be increasingly altered per wife and s/p unwitnessed fall day prior to admission. Received CTX 1g at [facility].  - Continue CTX 2g daily (started 9/14)  - follow up sputum culture  - SAT/SBT    GI  #Decompensated Liver Cirrhosis  #Hepatic encephalopathy - see Neuro  #?SBP  #Alcohol Hepatitis  History of chronic alcohol use presenting with hepatic encephalopathy ammonia 169 -->159 today with distended abdomen, soft and non-tender and diffusely jaundice. Bedside POCUS with small pockets of fluid. Negative hepatitis panel, HIV.  - CTX 2g daily for SBP ppx  - Prednisolone for severe alcohol hepatitis  - Trend ammonia levels qd  - MELD score 41 today  - Maddrey score 200 today  - Child Ferguson score 13-15 (unable to assess mental status while sedated)  - Continue thiamine, folic acid  - Hepatology consulted for transplant: recommendations appreciated.    #Possible Pancreatitis - unlikely.  CT abdomen with some papito-pancreatic stranding. WBC increasing to 17 today. Pt had no abdominal pain on arrival to ED. Tachypneic to 30s-40s upon abdominal palpation. Lipase 265 on admission, improving to 133.   -Merna score 0 points  - low risk for mortality, unlikely to be severe pancreatitis.  -Re-assess at 48 hours (9/16 afternoon).  -Pt has hepatorenal syndrome - cannot give maintenance fluid although clinical suspicion is low at this time.  -On fentanyl drip    #Elevated T-bili  Tbili >25, direct bili >16on admission. CTA a/p: normal biliary duct/gallbladder but has hepatic steatosis with hepatomegaly, and portal hypertension.  - T bili increasing to 28.3 today  - Trend tbili  - LDH elevated at 338, Hapto WNL at 40.    Nephro  #WILLIAM vs WILLIAM on CKD with anuria  #Hepatorenal syndrome  BUN 55, Cr 6.78 suspect hepatorenal syndrome in setting of portal hypertension with splanchnic arterial vasodilation resulting in decreased renal perfusion. CK low at 24.  - UOP 25cc since admission.   - BUN/Cr 52/5.9 today. Phos 5.4. Mg WNL. Na 120.  - On Albumin 25% IVPB 100mg q6h for 3 days.  - Norepinephrine with goal MAP 70 (10 above presenting MAP of 60)  - Nephrology onboard: recommend CVVHD.  - Strict I/Os.  - Avoid nephrotoxic med and renally dose meds.    #Hyponatremia  Na 118 on admission, received 3L NS in ED. Started 3% NS @ 25cc/hr.  - Repeat BMP q4h with goal Na 122 by 6PM 9/15, goal Na 126 by 6PM 9/16  - If Na 126 or higher can give dose of DDAVP 1mcg (can give additional dose 6 hours after if needed)  - Urine Na <20  - Urine osm 290 s/p 2L NS  - Serum osm 276  - Nephro onbaord: likely hypovolemic hyponatremia.    #NAGMA  Bicarb 18, normal AG. Pt met 2/4 SIRS criteria on admission. Had episode of loose/mucous stool.  - Trend bicarb and AG  -  Bicarb 14, AG normal today.  -  F/u C. difficile  - GI panel negative    Heme  #Anemia - stable.  Hb 7.3, .5 likely in setting of alcohol use disorder. History of hemorrhoids. Has ecchymosis on flank however no active bleed and known history of anemia. Partner denies history of esophageal varices. INR 2.5. S/p 1 unit pRBC and 1 unit FFP overnight, Hb 7.6 today.  - Continue Vitamin b12, folic acid 1mg daily.  - f/u serum b12 and folate.  - Type & Screen  - Transfuse for Hb <7 or <8 with active bleed  - LDH high, haptoglobin WNL  - Iron 89, TIBC low <17, ferritin elevated 3,417.   -PT/INR/PTT today: 24/2.15/51.4 although this is 2/2 liver dysfunction.    #Thrombocytopenia - stable.  Platelet 115, likely 2/2 alcohol use disorder. 123 today.  - Trend platelets  - Transfuse <10, <20 with fever or <50 with bleeding    ID  #Sepsis  Met 2/4 SIRS criteria (leukocytosis, tachycardic) with suspected asp PNA vs SBP. Blood and urine cultures negative at 12 hours.  - f/u sputum culture  - f/u blood, urine cultures at 48 hours  - continue CTX 2g daily  - f/u cdiff in setting of mucous/loose stool    Fluids: n/a  Electrolytes: Caution in setting of suspected WILLIAM  Nutrition: NPO  DVT ppx: Heparin 5000 q8h (prothrombotic state)  GI ppx: Protonix  Code: Full code  Dispo: MICU

## 2023-09-15 NOTE — CONSULT NOTE ADULT - ASSESSMENT
-------------------------------  ASSESSMENT/PLAN:    IMPRESSION: DEON VARGAS  is a 53 year old male with hepatic chirosis, alcoholism who presented with AMS, admitted for presumed hepatic encephalopathy, coagulapathy hepatorenal syndrome to the MICU for complex medical management. CTH for report of fall is concerning for R extra-axial hematoma.  Patient s/p traumatic intubation, patient noted to have a cuff leak the day after- now s/p another intubation that was slightly traumatic. Glide scope noted to have slight oozing during intubation.  Patient not currently on any anticoagulation No epistaxis reported.  Patient suspected to have soft palate trauma from intubation.  Oral pack placed. Nasal endoscopy revealed no original site of bleeding.     RECOMMENDATIONS:  - abx while oral pack is in place   - ENT will change oral pack over the weekend.   - No additional ENT intervention needed at this time   - afrin to b/l nares BID   - mupirocin b/l nares BID   ---  Thank you for the kind referral and for allowing me to share in the care of DEON VARGAS If you have any questions, please do not hesitate to contact me.     Sincerely,  Jannet Jain PA-C  09-15-23 @ 14:26

## 2023-09-15 NOTE — CONSULT NOTE ADULT - ASSESSMENT
53M h/o EtOH cirrhosis, anemia, GERD p/w AMS (s/p intubation) and acute renal failure.     Unclear baseline labs, prior workup, or prior history of decompensation. No acute hepatitis. No tappable pocket, no PVT. Renal failure in cirrhosis portends poor prognosis.    Recommendations:  - Agree with broad infectious workup, empiric abx, and would initiate fungal coverage  - Continue norepi / albumin and can add vasopressin for hepatorenal syndrome treatment, but not a candidate for terlipressin given Cr >5 and vent support  - Trend CBC and agree with IV PPI for now. Please start octreotide gtt and call GI if evidence of GI bleed  - Continue treatment of HE with PO lactulose + rifaximin  - Would NOT give steroids in setting of renal failure    We will continue to follow. Please see attending addendum for final recommendations.     Simon (Kishan) MD Phillip  Gastroenterology Fellow  Pager (M-F 7a-5p): [phone]  Pager (after hours): Please call  for on-call fellow

## 2023-09-15 NOTE — DIETITIAN INITIAL EVALUATION ADULT - ADD RECOMMEND
1. Continue with NPO status  > hold off feeds until resuscitation   2. when able to resume po, recommend DASH/TLC with consistency per MD discretion   3. If unable to resume feeds, recommend obtaining EN access  *if NPO > 5 days, consider TPN   4. Monitor GI fxn, adjust bowel regimen prn to promote optimal stooling   5. Monitor labs, skin integrity, wts   6. Defer fluids to team  7. Continue with daily multivitamin, folic acid, thiamine   8. Monitor propofol/pressors; RD to adjust nutrition plan prn   9. RD to follow

## 2023-09-15 NOTE — CONSULT NOTE ADULT - SUBJECTIVE AND OBJECTIVE BOX
54yo Male of EtOH abuse w/ cirrhosis, anemia, and GERD, requiring transfusion in the past, brought in by EMS for AMS s/p unwitness fall. Patient's partner states that he had a mechanical fall on 9/13 evening, afterwards told her not to call EMS to take him to the hospital.  Today, patient has been less responsive than usual, so she called EMS to bring him in.  Patient developed extensive bruising to left abdomen and flank 2 to 3 days ago, prior to last night's fall. Pt normally drinks 1 bottle of vodka daily but has been cutting back to a few shots. General surgery was consulted for hemoperitoneum vs ascites noted on CTAP, in setting of large L. flank hematoma.     Currently, patient is intubated and sedated. VS in appropriate range on levo 0.19 (up from 0.9 earlier in the night), anuric overnight (5cc total). On exam, Visibly jaundiced. Patients abdomen is soft, mildly distended, tympanic to percussion. Large left side area of ecchymosis encompassing entire left flank. Majority appears superficial, with smaller area of swelling within. Labs notable for WBC 17 (14), Hgb 7.6 (7.0), Na 120 (120), Cr 5.89 (5.87). T bili 27 (29) Mild ALP, AST elevation. CT head prelim read notable for Trace interval increase in right frontal region extra-axial hematoma. No acute intracranial hemorrhage, mass effect, or midline shift. CTAP prelim read notable for small to moderate volume free fluid in the paracolic gutters, right greater than left, right lower quadrant, and dependent pelvis. Some inflammation adjacent to the pancreas and anterior pararenal fascia suspicious for acute pancreatitis. Final read with increased small volume ascites, could be related to history of peritoneal lavage or ascites related to portal hypertension. No evidence of hemoperitoneum.    T(C): 36 (09-15-23 @ 06:03), Max: 36 (09-14-23 @ 22:34)  HR: 89 (09-15-23 @ 05:00) (78 - 104)  BP: 98/44 (09-15-23 @ 05:00) (90/44 - 150/106)  RR: 16 (09-15-23 @ 05:00) (13 - 32)  SpO2: 94% (09-15-23 @ 05:00) (94% - 100%)    Physical Exam  General: Intubated, sedated. Jaundiced throughout.   Cardio: RRR.   Pulm: Intubated on VCAC settings 40/450/16/5. No inc wob.   Abdomen: soft, Mildly distended, tympanic throughout. NGT in place to suction w/ some bloody appearing fluid in cannister. Left flank with large area of ecchymosis w/ a smaller area within it w/ swelling.   : Duron with minimal dark urine  Extremities: WWP, no edema. L. knee with ecchymosis. R. shoulder with ecchymosis.       LABS:                        7.6    17.09 )-----------( 123      ( 15 Sep 2023 06:08 )             21.5     09-15    120<LL>  |  87<L>  |  52<H>  ----------------------------<  117<H>  4.7   |  17<L>  |  5.89<H>    Ca    7.8<L>      15 Sep 2023 01:37  Phos  5.4     09-14  Mg     2.3     09-14    TPro  5.1<L>  /  Alb  2.1<L>  /  TBili  27.3<H>  /  DBili  x   /  AST  119<H>  /  ALT  41  /  AlkPhos  157<H>  09-14    PT/INR - ( 14 Sep 2023 23:28 )   PT: 26.3 sec;   INR: 2.37          PTT - ( 14 Sep 2023 23:28 )  PTT:51.4 sec    Assessment:  54yo Male of EtOH abuse w/ cirrhosis, anemia, and GERD, requiring transfusion in the past, brought in by EMS for AMS s/p unwitness fall. Patient's partner states that he had a mechanical fall on 9/13 evening, afterwards told her not to call EMS to take him to the hospital.  Today, patient has been less responsive than usual, so she called EMS to bring him in.  Patient developed extensive bruising to left abdomen and flank 2 to 3 days ago, prior to last night's fall. Pt normally drinks 1 bottle of vodka daily but has been cutting back to a few shots. General surgery was consulted for hemoperitoneum vs ascites noted on CTAP, in setting of large L. flank hematoma. Final CTAP read with increased small volume ascites, could be related to history of peritoneal lavage or ascites related to portal hypertension. No evidence of hemoperitoneum.      Recommendations:  - No surgical intervention warranted at this time.  - Patient is not a surgical candidate at this time.  - Surgery Team 4C will sign off, please re-consult with any further questions or if patient's clinical status changes.  - Discussed with Dr. Jenkins.

## 2023-09-15 NOTE — PROCEDURE NOTE - NSBRONCHPROCDETAILS_GEN_A_CORE_FT
Bronchoscope inserted through 8.0 ETT. Airway evaluation revealed Sharp Larisa, edematous airways, old blood that was suctioned. Instilled 3 cc lidocaine 1% topical. RUL, RML, RLL, LY, and LLL evaluation revealed edematous, erythematous airways with dried, bloody secretions which were suctioned. No evidence of DAH on sequential lavage. BAL performed in RML with infusion of 120 cc saline with 60 cc return. ETT noted to be in good position. Bronchoscope then withdrawn from ETT. Minimal bleeding noted  Bronchoscope inserted through 8.0 ETT. Airway evaluation revealed Sharp Larisa, edematous airways, old blood that was suctioned. Instilled 3 cc lidocaine 1% topical. RUL, RML, RLL, LY, and LLL evaluation revealed edematous, erythematous airways with bloody secretions which were suctioned. No evidence of DAH on sequential lavage. BAL performed in RML with infusion of 120 cc saline with 60 cc return. ETT noted to be in good position. Bronchoscope then withdrawn from ETT. Minimal bleeding noted.

## 2023-09-16 NOTE — PROGRESS NOTE ADULT - ASSESSMENT
53M h/o EtOH cirrhosis, anemia, GERD p/w AMS (s/p intubation) and acute renal failure.     Unclear baseline labs, prior workup, or prior history of decompensation. No acute hepatitis. No tappable pocket, no PVT. Renal failure in cirrhosis portends poor prognosis.    Suspect low likelihood of transplant candidacy though he has not been formally evaluated.    Recommendations:  - Agree with broad infectious workup, empiric abx, and would initiate fungal coverage  - Continue norepi / albumin and can add vasopressin for hepatorenal syndrome treatment, but not a candidate for terlipressin given Cr >5 and vent support  - Trend CBC and agree with IV PPI for now. Please start octreotide gtt and call GI if evidence of GI bleed  - Continue treatment of HE with PO lactulose + rifaximin  - Would be cautious of steroids given renal failure and bleeding, and if continue would watch very closely for bleeding or infection    We will continue to follow. Please see attending addendum for final recommendations.     Simon (Kishan) MD Phillip  Gastroenterology Fellow  Pager (M-F 7a-5p): [phone]  Pager (after hours): Please call  for on-call fellow

## 2023-09-16 NOTE — PATIENT PROFILE ADULT - FALL HARM RISK - HARM RISK INTERVENTIONS

## 2023-09-16 NOTE — PROGRESS NOTE ADULT - SUBJECTIVE AND OBJECTIVE BOX
HEPATOLOGY PROGRESS NOTE    INTERVAL/SUBJECTIVE:  - Remains intubated and on CRRT  - Has good family support per primary team, though has been actively drinking    Allergies  No Known Allergies    Intolerances    MEDICATIONS:  MEDICATIONS  (STANDING):  albumin human 25% IVPB 100 milliLiter(s) IV Intermittent every 6 hours  chlorhexidine 0.12% Liquid 15 milliLiter(s) Oral Mucosa every 12 hours  chlorhexidine 2% Cloths 1 Application(s) Topical <User Schedule>  CRRT Treatment    <Continuous>  cyanocobalamin Injectable 1000 MICROGram(s) IntraMuscular every 24 hours  fentaNYL   Infusion. 0.5 MICROgram(s)/kG/Hr (4.4 mL/Hr) IV Continuous <Continuous>  folic acid 1 milliGRAM(s) Oral daily  heparin   Injectable 5000 Unit(s) SubCutaneous every 8 hours  lactulose Retention Enema 200 Gram(s) Rectal every 24 hours  lactulose Syrup 60 Gram(s) Oral every 4 hours  levETIRAcetam  IVPB 500 milliGRAM(s) IV Intermittent every 12 hours  multivitamin 1 Tablet(s) Oral daily  mupirocin 2% Nasal 1 Application(s) Both Nostrils two times a day  norepinephrine Infusion 0.05 MICROgram(s)/kG/Min (8.25 mL/Hr) IV Continuous <Continuous>  oxymetazoline 0.05% Nasal Spray 1 Spray(s) Both Nostrils two times a day  pantoprazole  Injectable 40 milliGRAM(s) IV Push every 12 hours  piperacillin/tazobactam IVPB.. 4.5 Gram(s) IV Intermittent every 12 hours  prednisoLONE    3 mG/mL Solution (ORAPRED) 40 milliGRAM(s) Oral daily  propofol Infusion 10 MICROgram(s)/kG/Min (5.28 mL/Hr) IV Continuous <Continuous>  PureFlow Dialysate RFP-400 (K 2 / Ca 3) 5000 milliLiter(s) (1300 mL/Hr) CRRT <Continuous>  rifAXIMin 550 milliGRAM(s) Oral two times a day  thiamine IVPB 500 milliGRAM(s) IV Intermittent daily    MEDICATIONS  (PRN):  sodium chloride 0.9% lock flush 10 milliLiter(s) IV Push every 1 hour PRN Pre/post blood products, medications, blood draw, and to maintain line patency    Vital Signs Last 24 Hrs  T(C): 35.7 (16 Sep 2023 18:37), Max: 36.3 (16 Sep 2023 16:00)  T(F): 96.3 (16 Sep 2023 18:37), Max: 97.3 (16 Sep 2023 16:00)  HR: 84 (16 Sep 2023 18:00) (83 - 96)  BP: 108/57 (16 Sep 2023 18:00) (88/51 - 131/71)  BP(mean): 76 (16 Sep 2023 18:00) (62 - 90)  RR: 16 (16 Sep 2023 18:00) (12 - 19)  SpO2: 97% (16 Sep 2023 18:00) (95% - 99%)    Parameters below as of 16 Sep 2023 18:00  Patient On (Oxygen Delivery Method): ventilator    O2 Concentration (%): 50    09-15 @ 07:01  -  09-16 @ 07:00  --------------------------------------------------------  IN: 2347.5 mL / OUT: 946 mL / NET: 1401.5 mL    09-16 @ 07:01  -  09-16 @ 19:00  --------------------------------------------------------  IN: 1873.1 mL / OUT: 1394 mL / NET: 479.1 mL      PHYSICAL EXAM:  General: Alert, no acute distress  Lungs: Normal respiratory effort  Abdomen: Nondistended, soft, nontender, No rebound or guarding  Extremities: No edema  Neurological: Moving all extremities spontaneously    LABS:                        6.8    12.78 )-----------( 107      ( 16 Sep 2023 10:29 )             18.9     09-16    128<L>  |  92<L>  |  47<H>  ----------------------------<  164<H>  4.3   |  18<L>  |  5.31<H>    Ca    8.0<L>      16 Sep 2023 05:34  Phos  6.0     09-16  Mg     2.2     09-16    TPro  5.4<L>  /  Alb  3.1<L>  /  TBili  28.0<H>  /  DBili  x   /  AST  102<H>  /  ALT  40  /  AlkPhos  134<H>  09-16    PT/INR - ( 16 Sep 2023 05:34 )   PT: 22.7 sec;   INR: 2.03     PTT - ( 16 Sep 2023 05:34 )  PTT:46.6 sec      Culture - Bronchial (collected 15 Sep 2023 16:45)  Source: Lavage RML BAL  Gram Stain (15 Sep 2023 19:18):    No epithelial cells seen    Rare to few White blood cells    No organisms seen  Preliminary Report (16 Sep 2023 10:02):    No growth to date    Culture - Sputum (collected 15 Sep 2023 15:26)  Source: ET Tube ET Tube  Gram Stain (15 Sep 2023 17:01):    No epithelial cells seen    Few WBC's    No organisms seen  Preliminary Report (16 Sep 2023 12:44):    Moderate Staphylococcus aureus Presumptive Methicillin susceptible    Confirmation to follow within 24 hrs.    Susceptibility to follow.    Accompanied by normal respiratory floresita    Culture - Blood (collected 14 Sep 2023 19:17)  Source: .Blood Blood  Preliminary Report (15 Sep 2023 20:00):    No growth at 1 day.    Culture - Blood (collected 14 Sep 2023 19:17)  Source: .Blood Blood  Preliminary Report (15 Sep 2023 20:00):    No growth at 1 day.    Urinalysis with Rflx Culture (collected 14 Sep 2023 18:35)    Culture - Urine (collected 14 Sep 2023 18:35)  Source: Catheterized None  Final Report (16 Sep 2023 11:13):    No growth      RADIOLOGY & ADDITIONAL STUDIES:  Reviewed

## 2023-09-16 NOTE — PROGRESS NOTE ADULT - NUTRITIONAL ASSESSMENT
I:  Intubated. on CVVHD. Hypotensive.   A: WILLIAM. Shock.   P: Keep MAP > 65. Continue CVVHD. Transfuse PRN. I:  Intubated. on CVVHD. Hypotensive.   A: WILLIAM. Shock.   P: Keep MAP > 65. Continue CVVHD. Transfuse PRN. Outcome is poor. Do not feel that giving him D5W to lower serum sodium will affect outcome in any way.

## 2023-09-16 NOTE — PROGRESS NOTE ADULT - SUBJECTIVE AND OBJECTIVE BOX
Hospital course: 53M PMH of EtOH abuse and cirrhosis, anemia requiring transfusion in the past, and GERD, brought in by EMS for altered mental status. Patient's partner states that he had a mechanical fall nightprior, afterwards told her not to call EMS to take him to the hospital. On day of admission, patient was less responsive than usual, so she called EMS to bring him in.  Partner states that patient developed extensive bruising to left abdomen and flank 2 to 3 days ago, prior to the fall. Pt normally drinks 1 bottle of vodka daily but has been cutting back to a few shots. Admitted to MICU for intubation i/s/o AMS and airway protection. Found to have hepatorenal syndrome and likely hepatic encephalopathy 2/2 alcohol hepatitis and decompensated liver cirrhosis, a right frontal subdural vs epidural hematoma, WILLIAM with oliguria, and SIRS 2/2 aspiration PA vs SBP. On increasing Levophed drip.     INTERVAL HPI/OVERNIGHT EVENTS:  Minor bleeding from ET tube. Given protonix, 1 unit pRBC, 1 unit FFP, d/c Heparin.  Head CT showed no significant change in small right frontal subdural hematoma, increased opacification of paranasal sinuses.  CT abdomen notable for ascites and mild peripancreatic stranding; no retroperitoneal hematoma.  Sodium stable at 120.    SUBJECTIVE: Patient seen and examined at bedside. Intubated and sedated. On Levophed.    ROS: All negative except as listed above.    VITAL SIGNS:  ICU Vital Signs Last 24 Hrs  T(C): 35.7 (15 Sep 2023 11:00), Max: 36 (14 Sep 2023 22:34)  T(F): 96.3 (15 Sep 2023 11:00), Max: 96.8 (14 Sep 2023 22:34)  HR: 84 (15 Sep 2023 13:00) (78 - 104)  BP: 103/55 (15 Sep 2023 13:00) (90/44 - 150/106)  BP(mean): 75 (15 Sep 2023 13:00) (60 - 125)  RR: 15 (15 Sep 2023 13:00) (8 - 34)  SpO2: 99% (15 Sep 2023 13:00) (93% - 100%)    O2 Parameters below as of 15 Sep 2023 13:00  Patient On (Oxygen Delivery Method): ventilator    O2 Concentration (%): 40    Mode: AC/ CMV (Assist Control/ Continuous Mandatory Ventilation), RR (machine): 16, TV (machine): 450, FiO2: 37, PEEP: 5, ITime: 1, MAP: 8.3, PIP: 18  Plateau pressure:   P/F ratio:     09-14 @ 07:01  -  09-15 @ 07:00  --------------------------------------------------------  IN: 1358.8 mL / OUT: 230 mL / NET: 1128.8 mL    09-15 @ 07:01  -  09-15 @ 13:35  --------------------------------------------------------  IN: 612 mL / OUT: 0 mL / NET: 612 mL    CAPILLARY BLOOD GLUCOSE    POCT Blood Glucose.: 114 mg/dL (14 Sep 2023 13:45)    ECG: reviewed.    PHYSICAL EXAM:    GENERAL: ill appearing, jaundiced.  HEAD: Normocephalic, some bruising on scalp.  EYES: icteric sclera.  ENT: dry, bloody mucous membrane.  NECK: Supple, + JVD, right > left.  CHEST/LUNG: Clear to auscultation bilaterally; No rales, rhonchi, wheezing or rubs. Unlabored respirations.  HEART: Regular rate and rhythm; No murmurs, rubs, or gallops.  ABDOMEN: Reduced bowel sounds, abdomen large and distended but soft. No guarding or rigidity. Tachypneic on abdominal exam (i.e. tenderness).  EXTREMITIES: 2+ Peripheral Pulses, brisk capillary refill. No clubbing, cyanosis, or edema. Warm extremities, except for cool feet.  NERVOUS SYSTEM: Patient is intubated and sedated.  SKIN: diffuse jaundice throughout body, large ecchymosis on L posterior thorax.    MEDICATIONS:  MEDICATIONS  (STANDING):  albumin human 25% IVPB 100 milliLiter(s) IV Intermittent every 6 hours  cefTRIAXone   IVPB 2000 milliGRAM(s) IV Intermittent every 24 hours  chlorhexidine 0.12% Liquid 15 milliLiter(s) Oral Mucosa every 12 hours  chlorhexidine 2% Cloths 1 Application(s) Topical <User Schedule>  CRRT Treatment    <Continuous>  fentaNYL   Infusion. 0.5 MICROgram(s)/kG/Hr (4.4 mL/Hr) IV Continuous <Continuous>  folic acid 1 milliGRAM(s) Oral daily  heparin   Injectable 5000 Unit(s) SubCutaneous every 8 hours  lactulose Retention Enema 200 Gram(s) Rectal every 24 hours  lactulose Syrup 60 Gram(s) Oral every 4 hours  multivitamin 1 Tablet(s) Oral daily  norepinephrine Infusion 0.05 MICROgram(s)/kG/Min (8.25 mL/Hr) IV Continuous <Continuous>  pantoprazole  Injectable 40 milliGRAM(s) IV Push every 12 hours  prednisoLONE    3 mG/mL Solution (ORAPRED) 40 milliGRAM(s) Oral daily  propofol Infusion 10 MICROgram(s)/kG/Min (5.28 mL/Hr) IV Continuous <Continuous>  propofol Injectable 15 milliGRAM(s) IV Push once  PureFlow Dialysate RFP-400 (K 2 / Ca 3) 5000 milliLiter(s) (1300 mL/Hr) CRRT <Continuous>  rifAXIMin 550 milliGRAM(s) Oral two times a day  thiamine IVPB 500 milliGRAM(s) IV Intermittent daily    MEDICATIONS  (PRN):    ALLERGIES:  No Known Allergies    LABS:                        7.4    17.24 )-----------( 147      ( 15 Sep 2023 10:43 )             21.1     09-15    121<L>  |  87<L>  |  56<H>  ----------------------------<  124<H>  5.0   |  15<L>  |  6.32<H>    Ca    8.3<L>      15 Sep 2023 10:43  Phos  7.3     09-15  Mg     2.2     09-15    TPro  5.7<L>  /  Alb  2.9<L>  /  TBili  28.3<H>  /  DBili  see note  /  AST  119<H>  /  ALT  43  /  AlkPhos  153<H>  09-15    PT/INR - ( 15 Sep 2023 10:43 )   PT: 24.0 sec;   INR: 2.15     PTT - ( 15 Sep 2023 10:43 )  PTT:51.4 sec    Urinalysis Basic - ( 15 Sep 2023 10:43 )  Color: x / Appearance: x / SG: x / pH: x  Gluc: 124 mg/dL / Ketone: x  / Bili: x / Urobili: x   Blood: x / Protein: x / Nitrite: x   Leuk Esterase: x / RBC: x / WBC x   Sq Epi: x / Non Sq Epi: x / Bacteria: x    ABG:  pH, Arterial: 7.37 (09-14-23 @ 20:44)  pCO2, Arterial: 25 mmHg (09-14-23 @ 20:44)  pO2, Arterial: 87 mmHg (09-14-23 @ 20:44)  pH, Arterial: 7.34 (09-14-23 @ 19:13)  pCO2, Arterial: 27 mmHg (09-14-23 @ 19:13)  pO2, Arterial: 114 mmHg (09-14-23 @ 19:13)    vBG:  pH, Venous: 7.39 (09-14-23 @ 14:12)  pCO2, Venous: 28 mmHg (09-14-23 @ 14:12)  pO2, Venous: 45 mmHg (09-14-23 @ 14:12)    Micro:    Culture - Blood (collected 09-14-23 @ 19:17)  Source: .Blood Blood  Preliminary Report (09-15-23 @ 08:01):    No growth at 12 hours    Culture - Blood (collected 09-14-23 @ 19:17)  Source: .Blood Blood  Preliminary Report (09-15-23 @ 08:01):    No growth at 12 hours    Urinalysis with Rflx Culture (collected 09-14-23 @ 18:35)    RADIOLOGY & ADDITIONAL TESTS: Reviewed. Hospital course: 53M PMH of EtOH abuse and cirrhosis, anemia requiring transfusion in the past, and GERD, brought in by EMS for altered mental status. Patient's partner states that he had a mechanical fall nightprior, afterwards told her not to call EMS to take him to the hospital. On day of admission, patient was less responsive than usual, so she called EMS to bring him in.  Partner states that patient developed extensive bruising to left abdomen and flank 2 to 3 days ago, prior to the fall. Pt normally drinks 1 bottle of vodka daily but has been cutting back to a few shots. Admitted to MICU for intubation i/s/o AMS and airway protection. Found to have hepatorenal syndrome and likely hepatic encephalopathy 2/2 alcohol hepatitis and decompensated liver cirrhosis, a right frontal subdural vs epidural hematoma, WILLIAM with oliguria, and SIRS 2/2 aspiration PA vs SBP. On stable Levophed drip .2mcg/kg.     INTERVAL HPI/OVERNIGHT EVENTS:  Significant bleeding from nares, ENT came in the AM to pack the nose  CT abdomen notable for ascites and mild peripancreatic stranding  Sodium increased to 128    SUBJECTIVE: Patient seen and examined at bedside. Intubated and sedated. On Levophed .2mcg/kg. Pt no longer sedated and somnolent but able to be aroused.    ICU Vital Signs Last 24 Hrs  T(C): 35.8 (16 Sep 2023 14:32), Max: 36.1 (16 Sep 2023 00:01)  T(F): 96.4 (16 Sep 2023 14:32), Max: 97 (16 Sep 2023 00:01)  HR: 87 (16 Sep 2023 15:00) (83 - 96)  BP: 102/52 (16 Sep 2023 15:00) (88/51 - 131/71)  BP(mean): 72 (16 Sep 2023 15:00) (62 - 90)  RR: 16 (16 Sep 2023 15:00) (12 - 19)  SpO2: 95% (16 Sep 2023 15:00) (92% - 100%)    O2 Parameters below as of 16 Sep 2023 15:00  Patient On (Oxygen Delivery Method): ventilator    O2 Concentration (%): 50    PHYSICAL EXAM:  GENERAL: ill appearing, jaundiced.  NEURO: SPt is intubated but somnolent but able to be aroused briefly  HEAD: Normocephalic, some bruising on scalp.  EYES: icteric sclera.  ENT: Large clot covering the opening of the nares w/ bloody mucous membrane.  NECK: Supple, + JVD, right > left.  CHEST/LUNG: Clear to auscultation bilaterally; No rales, rhonchi, wheezing or rubs. Unlabored respirations.  HEART: Regular rate and rhythm; No murmurs, rubs, or gallops.  ABDOMEN: Reduced bowel sounds, abdomen large and distended but soft. No guarding or rigidity. Tachypneic on abdominal exam (i.e. tenderness).  EXTREMITIES: 2+ Peripheral Pulses, brisk capillary refill. No clubbing, cyanosis, or edema. Warm extremities, except for cool feet.  SKIN: diffuse jaundice throughout body, large ecchymosis on L posterior thorax.    I&O's Summary    15 Sep 2023 07:01  -  16 Sep 2023 07:00  --------------------------------------------------------  IN: 2347.5 mL / OUT: 946 mL / NET: 1401.5 mL    16 Sep 2023 07:01  -  16 Sep 2023 15:47  --------------------------------------------------------  IN: 1320 mL / OUT: 1038 mL / NET: 282 mL      Mode: AC/ CMV (Assist Control/ Continuous Mandatory Ventilation)  RR (machine): 16  TV (machine): 450  FiO2: 50  PEEP: 5  ITime: 1  MAP: 8  PIP: 18    LABS:                        6.8    12.78 )-----------( 107      ( 16 Sep 2023 10:29 )             18.9     09-16    128<L>  |  92<L>  |  47<H>  ----------------------------<  164<H>  4.3   |  18<L>  |  5.31<H>    Ca    8.0<L>      16 Sep 2023 05:34  Phos  6.0     09-16  Mg     2.2     09-16    TPro  5.4<L>  /  Alb  3.1<L>  /  TBili  28.0<H>  /  DBili  x   /  AST  102<H>  /  ALT  40  /  AlkPhos  134<H>  09-16    PT/INR - ( 16 Sep 2023 05:34 )   PT: 22.7 sec;   INR: 2.03     PTT - ( 16 Sep 2023 05:34 )  PTT:46.6 sec  Urinalysis Basic - ( 16 Sep 2023 05:34 )    Color: x / Appearance: x / SG: x / pH: x  Gluc: 164 mg/dL / Ketone: x  / Bili: x / Urobili: x   Blood: x / Protein: x / Nitrite: x   Leuk Esterase: x / RBC: x / WBC x   Sq Epi: x / Non Sq Epi: x / Bacteria: x    CAPILLARY BLOOD GLUCOSE    ABG - ( 14 Sep 2023 20:44 )  pH, Arterial: 7.37  pH, Blood: x     /  pCO2: 25    /  pO2: 87    / HCO3: 14    / Base Excess: -9.0  /  SaO2: 99.4      Consultant(s) Notes Reviewed:  [x ] YES  [ ] NO    MEDICATIONS  (STANDING):  albumin human 25% IVPB 100 milliLiter(s) IV Intermittent every 6 hours  chlorhexidine 0.12% Liquid 15 milliLiter(s) Oral Mucosa every 12 hours  chlorhexidine 2% Cloths 1 Application(s) Topical <User Schedule>  chlorhexidine 4% Liquid 1 Application(s) Topical <User Schedule>  CRRT Treatment    <Continuous>  cyanocobalamin Injectable 1000 MICROGram(s) IntraMuscular every 24 hours  fentaNYL   Infusion. 0.5 MICROgram(s)/kG/Hr (4.4 mL/Hr) IV Continuous <Continuous>  folic acid 1 milliGRAM(s) Oral daily  heparin   Injectable 5000 Unit(s) SubCutaneous every 8 hours  lactulose Retention Enema 200 Gram(s) Rectal every 24 hours  lactulose Syrup 60 Gram(s) Oral every 4 hours  levETIRAcetam  IVPB 500 milliGRAM(s) IV Intermittent every 12 hours  multivitamin 1 Tablet(s) Oral daily  mupirocin 2% Nasal 1 Application(s) Both Nostrils two times a day  norepinephrine Infusion 0.05 MICROgram(s)/kG/Min (8.25 mL/Hr) IV Continuous <Continuous>  oxymetazoline 0.05% Nasal Spray 1 Spray(s) Both Nostrils two times a day  pantoprazole  Injectable 40 milliGRAM(s) IV Push every 12 hours  piperacillin/tazobactam IVPB.. 4.5 Gram(s) IV Intermittent every 12 hours  prednisoLONE    3 mG/mL Solution (ORAPRED) 40 milliGRAM(s) Oral daily  propofol Infusion 10 MICROgram(s)/kG/Min (5.28 mL/Hr) IV Continuous <Continuous>  PureFlow Dialysate RFP-400 (K 2 / Ca 3) 5000 milliLiter(s) (1300 mL/Hr) CRRT <Continuous>  rifAXIMin 550 milliGRAM(s) Oral two times a day  thiamine IVPB 500 milliGRAM(s) IV Intermittent daily    MEDICATIONS  (PRN):  sodium chloride 0.9% lock flush 10 milliLiter(s) IV Push every 1 hour PRN Pre/post blood products, medications, blood draw, and to maintain line patency      Care Discussed with Consultants/Other Providers [ x] YES  [ ] NO    RADIOLOGY & ADDITIONAL TESTS:

## 2023-09-16 NOTE — PROGRESS NOTE ADULT - SUBJECTIVE AND OBJECTIVE BOX
Patient is a 53y Male seen and evaluated at bedside. Intubated, anuric, tolerating CVVHD, no complications overnight.       Meds:    albumin human 25% IVPB 100 every 6 hours  chlorhexidine 0.12% Liquid 15 every 12 hours  chlorhexidine 2% Cloths 1 <User Schedule>  CRRT Treatment  <Continuous>  cyanocobalamin Injectable 1000 every 24 hours  fentaNYL   Infusion. 0.5 <Continuous>  folic acid 1 daily  heparin   Injectable 5000 every 8 hours  lactulose Retention Enema 200 every 24 hours  lactulose Syrup 60 every 4 hours  levETIRAcetam  IVPB 500 every 12 hours  multivitamin 1 daily  mupirocin 2% Nasal 1 two times a day  norepinephrine Infusion 0.05 <Continuous>  oxymetazoline 0.05% Nasal Spray 1 two times a day  pantoprazole  Injectable 40 every 12 hours  piperacillin/tazobactam IVPB. 4.5 once  prednisoLONE    3 mG/mL Solution (ORAPRED) 40 daily  propofol Infusion 10 <Continuous>  PureFlow Dialysate RFP-400 (K 2 / Ca 3) 5000 <Continuous>  rifAXIMin 550 two times a day  thiamine IVPB 500 daily      T(C): , Max: 36.1 (09-15-23 @ 14:00)  T(F): , Max: 97 (09-15-23 @ 14:00)  HR: 91 (09-16-23 @ 11:00)  BP: 113/69 (09-16-23 @ 11:00)  BP(mean): 81 (09-16-23 @ 11:00)  RR: 12 (09-16-23 @ 11:00)  SpO2: 95% (09-16-23 @ 11:00)  Wt(kg): --    09-15 @ 07:01  -  09-16 @ 07:00  --------------------------------------------------------  IN: 2347.5 mL / OUT: 946 mL / NET: 1401.5 mL    09-16 @ 07:01  -  09-16 @ 11:57  --------------------------------------------------------  IN: 438.4 mL / OUT: 320 mL / NET: 118.4 mL          Review of Systems:  ROS negative except as per HPI      PHYSICAL EXAM:  GENERAL: ill appearing, jaundiced, Intubated, tolerating CVVHD   HEENT:  icteric sclera, blood   CHEST/LUNG: Clear to auscultation bilaterally; No rales, rhonchi, wheezing or rubs. on MV  HEART: Regular rate and rhythm   ABDOMEN: abdomen large and distended but soft.  EXTREMITIES: . No clubbing, cyanosis, or edema   NERVOUS SYSTEM:  sedated.  SKIN: diffuse jaundice  ecchymosis on L posterior thorax.  Access: [facility] HD cath accessed       LABS:                        7.5    22.29 )-----------( 148      ( 16 Sep 2023 07:40 )             21.2     09-16    128<L>  |  92<L>  |  47<H>  ----------------------------<  164<H>  4.3   |  18<L>  |  5.31<H>    Ca    8.0<L>      16 Sep 2023 05:34  Phos  6.0     09-16  Mg     2.2     09-16    TPro  5.4<L>  /  Alb  3.1<L>  /  TBili  28.0<H>  /  DBili  x   /  AST  102<H>  /  ALT  40  /  AlkPhos  134<H>  09-16      PT/INR - ( 16 Sep 2023 05:34 )   PT: 22.7 sec;   INR: 2.03          PTT - ( 16 Sep 2023 05:34 )  PTT:46.6 sec  Urinalysis Basic - ( 16 Sep 2023 05:34 )    Color: x / Appearance: x / SG: x / pH: x  Gluc: 164 mg/dL / Ketone: x  / Bili: x / Urobili: x   Blood: x / Protein: x / Nitrite: x   Leuk Esterase: x / RBC: x / WBC x   Sq Epi: x / Non Sq Epi: x / Bacteria: x      Osmolality, Random Urine: 290 mosm/kg (09-14 @ 18:35)  Sodium, Random Urine: <20 mmol/L (09-14 @ 18:35)        RADIOLOGY & ADDITIONAL STUDIES:

## 2023-09-16 NOTE — PROCEDURE NOTE - A LINES
Right Upper Lobe/Right Middle Lobe/Left Upper Lobe
Right Upper Lobe/Right Middle Lobe/Right Lower Lobe/Left Upper Lobe/Left Lower Lobe
Right Upper Lobe/Right Middle Lobe/Right Lower Lobe/Left Upper Lobe/Left Lower Lobe

## 2023-09-16 NOTE — PROGRESS NOTE ADULT - ASSESSMENT
53Y M now with anuric WILLIAM likley 2/2 HRS now requiring CVVHD for clearance , no signs of recovery, continue RRT for clearance       Anuric WILLIAM 2/2 HRS now requiring CVVHD   BCr not known  Cr on admission 6.32  No signs of renal recovery       Recommend:  Optimize BP. Keep MAP>70 for adequate renal perfusion  Continue with Levo, Albumin   Repeat UA and urine lytes in AM  BMP per icu protocol  Strict I&Os  CVVHD as below for clearance   CRRT Treatment:   Modality: CVVHD, Filter: NxStage CAR-505, Target Blood Flow: 200 mL/Min  Target Fluid Balance: No fluid removal

## 2023-09-16 NOTE — PROGRESS NOTE ADULT - SUBJECTIVE AND OBJECTIVE BOX
09-16 Patient seen at bedside. Had additional bleeding from mouth and nose last night with large clots pooling outside nose. Bleeding also noted from dialysis port and rectum. Hg 7.5-> 6.8. Primary team concerned for DIC, labs sent. Throat pack remains in place. Patient taken off sedation and more awake today.    ALLERGIES  No Known Allergies    MEDICATIONS  (STANDING):  albumin human 25% IVPB 100 milliLiter(s) IV Intermittent every 6 hours  chlorhexidine 0.12% Liquid 15 milliLiter(s) Oral Mucosa every 12 hours  chlorhexidine 2% Cloths 1 Application(s) Topical <User Schedule>  chlorhexidine 4% Liquid 1 Application(s) Topical <User Schedule>  CRRT Treatment    <Continuous>  cyanocobalamin Injectable 1000 MICROGram(s) IntraMuscular every 24 hours  fentaNYL   Infusion. 0.5 MICROgram(s)/kG/Hr (4.4 mL/Hr) IV Continuous <Continuous>  folic acid 1 milliGRAM(s) Oral daily  heparin   Injectable 5000 Unit(s) SubCutaneous every 8 hours  lactulose Retention Enema 200 Gram(s) Rectal every 24 hours  lactulose Syrup 60 Gram(s) Oral every 4 hours  levETIRAcetam  IVPB 500 milliGRAM(s) IV Intermittent every 12 hours  multivitamin 1 Tablet(s) Oral daily  mupirocin 2% Nasal 1 Application(s) Both Nostrils two times a day  norepinephrine Infusion 0.05 MICROgram(s)/kG/Min (8.25 mL/Hr) IV Continuous <Continuous>  oxymetazoline 0.05% Nasal Spray 1 Spray(s) Both Nostrils two times a day  pantoprazole  Injectable 40 milliGRAM(s) IV Push every 12 hours  piperacillin/tazobactam IVPB.. 4.5 Gram(s) IV Intermittent every 12 hours  prednisoLONE    3 mG/mL Solution (ORAPRED) 40 milliGRAM(s) Oral daily  propofol Infusion 10 MICROgram(s)/kG/Min (5.28 mL/Hr) IV Continuous <Continuous>  PureFlow Dialysate RFP-400 (K 2 / Ca 3) 5000 milliLiter(s) (1300 mL/Hr) CRRT <Continuous>  rifAXIMin 550 milliGRAM(s) Oral two times a day  thiamine IVPB 500 milliGRAM(s) IV Intermittent daily    MEDICATIONS  (PRN):  sodium chloride 0.9% lock flush 10 milliLiter(s) IV Push every 1 hour PRN Pre/post blood products, medications, blood draw, and to maintain line patency    LABS                         6.8    12.78 )-----------( 107      ( 16 Sep 2023 10:29 )             18.9    09-16    128<L>  |  92<L>  |  47<H>  ----------------------------<  164<H>  4.3   |  18<L>  |  5.31<H>    Ca    8.0<L>      16 Sep 2023 05:34  Phos  6.0     09-16  Mg     2.2     09-16    TPro  5.4<L>  /  Alb  3.1<L>  /  TBili  28.0<H>  /  DBili  x   /  AST  102<H>  /  ALT  40  /  AlkPhos  134<H>  09-16    LIVER FUNCTIONS - ( 16 Sep 2023 05:34 )  Alb: 3.1 g/dL / Pro: 5.4 g/dL / ALK PHOS: 134 U/L / ALT: 40 U/L / AST: 102 U/L / GGT: x           PT/INR - ( 16 Sep 2023 05:34 )   PT: 22.7 sec;   INR: 2.03          PTT - ( 16 Sep 2023 05:34 )  PTT:46.6 sec    Culture - Bronchial (collected 09-15 @ 16:45)  Source: Lavage RML BAL  Gram Stain (09-15 @ 19:18):    No epithelial cells seen    Rare to few White blood cells    No organisms seen  Preliminary Report (09-16 @ 10:02):    No growth to date    INs & OUTs  Drains:       VITAL SIGNS:  ICU Vital Signs Last 24 Hrs  T(C): 35.8 (16 Sep 2023 14:32), Max: 36.1 (16 Sep 2023 00:01)  T(F): 96.4 (16 Sep 2023 14:32), Max: 97 (16 Sep 2023 00:01)  HR: 87 (16 Sep 2023 15:00) (83 - 96)  BP: 102/52 (16 Sep 2023 15:00) (88/51 - 131/71)  BP(mean): 72 (16 Sep 2023 15:00) (62 - 90)  ABP: --  ABP(mean): --  RR: 16 (16 Sep 2023 15:00) (12 - 19)  SpO2: 95% (16 Sep 2023 15:00) (92% - 100%)    O2 Parameters below as of 16 Sep 2023 15:00  Patient On (Oxygen Delivery Method): ventilator    O2 Concentration (%): 50    PHYSICAL EXAM:  GENERAL: ill appearing, intubated, sedation held for exam   HEAD:  Atraumatic   EYES: icteric sclera  ENT: oral intubation tube and gastric tube, throat packing in place, stained red, large blood clot pooled outside nose.   Nares: dried blood b/l nasal cavity   NECK: Supple,   CHEST/LUNG: on vent   ABDOMEN: large and distended but soft and non-tender.  NERVOUS SYSTEM:  patient sedation weaned, patient awake with eyes open and responds to physical stimuli, does not follow commands.   SKIN: diffuse jaundice throughout body    BEDSIDE PROCEDURE: control of nose bleed and change of throat packing  Consent was not obtained as procedure done on urgent basis to assess and control source of bleeding. Propofol was started by the primary team to bring patient to RASS-4/-5. Patient was maintained in supine position on bed. Large clot was suctioned from external nose and nasal cavity bilaterally. No active bleeding noted anteriorly and no blood appeared to be pooling in posterior nasal cavity. 5.5cm merocels (nonabsorbable) covered in bacitracin placed bilaterally in nasal cavity. Afrin used to expand the Merocels.  Large left septal spur prevented Merocel from sliding along nasal floor on left side, so left side additionally packed with 2x6cm piece of surgicel (absorbable) inferiorly along floor of nose. Some minor oozing from left nose noted after placement of merocel from possible disruption of prior clot. Next, the throat pack was removed which was intermittently saturated with old clot. The posterior oropharynx was noted to have some blood tinged secretions, but no saleem bleeding noted and no obvious mucosal injuries noted on soft palate or within oral cavity. A new Afrin-soaked Kerlix was used to pack the oropharynx and oral cavity. Approximately 2/3 of Kerlix roll used.     ASSESSMENT/PLAN:    IMPRESSION: DEON VARGAS  is a 53 year old male with hepatic chirosis, alcoholism who presented with AMS, admitted for presumed hepatic encephalopathy, coagulapathy hepatorenal syndrome to the MICU for complex medical management. CTH for report of fall is concerning for R extra-axial hematoma.  Patient s/p traumatic intubation, patient noted to have a cuff leak the day after- now s/p another intubation that was slightly traumatic. Glide scope noted to have slight oozing during intubation.  Patient not currently on any anticoagulation. Source of bleeding indeterminate at this time but patient appears to be in a coagulopathic state with multiple sites of bleeding suspected. Nasal bleeding seemed more diffuse from raw mucosa. No soft palate injury directly visualized on limited exam but could likewise have diffuse mucosal bleeding or possible laceration from intubation trauma anywhere within pharynx and larynx. Throat and nose packing in place.     RECOMMENDATIONS:  - Abx with staphylococcal coverage while throat and/or nasal pack is in place   - Packing to be changed by ENT as needed  - Please keep patient in deep sedation while throat packing is in place  - Afrin to b/l nares BID   - Mupirocin b/l nares BID  - ENT will continue to follow.      09-16 Patient seen at bedside. Had additional bleeding from mouth and nose last night with large clots pooling outside nose. Bleeding also noted from dialysis port and ETT.. Hg 7.5-> 6.8. Primary team concerned for DIC, labs sent. Throat pack remains in place. Patient taken off sedation and more awake today.    ALLERGIES  No Known Allergies    MEDICATIONS  (STANDING):  albumin human 25% IVPB 100 milliLiter(s) IV Intermittent every 6 hours  chlorhexidine 0.12% Liquid 15 milliLiter(s) Oral Mucosa every 12 hours  chlorhexidine 2% Cloths 1 Application(s) Topical <User Schedule>  chlorhexidine 4% Liquid 1 Application(s) Topical <User Schedule>  CRRT Treatment    <Continuous>  cyanocobalamin Injectable 1000 MICROGram(s) IntraMuscular every 24 hours  fentaNYL   Infusion. 0.5 MICROgram(s)/kG/Hr (4.4 mL/Hr) IV Continuous <Continuous>  folic acid 1 milliGRAM(s) Oral daily  heparin   Injectable 5000 Unit(s) SubCutaneous every 8 hours  lactulose Retention Enema 200 Gram(s) Rectal every 24 hours  lactulose Syrup 60 Gram(s) Oral every 4 hours  levETIRAcetam  IVPB 500 milliGRAM(s) IV Intermittent every 12 hours  multivitamin 1 Tablet(s) Oral daily  mupirocin 2% Nasal 1 Application(s) Both Nostrils two times a day  norepinephrine Infusion 0.05 MICROgram(s)/kG/Min (8.25 mL/Hr) IV Continuous <Continuous>  oxymetazoline 0.05% Nasal Spray 1 Spray(s) Both Nostrils two times a day  pantoprazole  Injectable 40 milliGRAM(s) IV Push every 12 hours  piperacillin/tazobactam IVPB.. 4.5 Gram(s) IV Intermittent every 12 hours  prednisoLONE    3 mG/mL Solution (ORAPRED) 40 milliGRAM(s) Oral daily  propofol Infusion 10 MICROgram(s)/kG/Min (5.28 mL/Hr) IV Continuous <Continuous>  PureFlow Dialysate RFP-400 (K 2 / Ca 3) 5000 milliLiter(s) (1300 mL/Hr) CRRT <Continuous>  rifAXIMin 550 milliGRAM(s) Oral two times a day  thiamine IVPB 500 milliGRAM(s) IV Intermittent daily    MEDICATIONS  (PRN):  sodium chloride 0.9% lock flush 10 milliLiter(s) IV Push every 1 hour PRN Pre/post blood products, medications, blood draw, and to maintain line patency    LABS                         6.8    12.78 )-----------( 107      ( 16 Sep 2023 10:29 )             18.9    09-16    128<L>  |  92<L>  |  47<H>  ----------------------------<  164<H>  4.3   |  18<L>  |  5.31<H>    Ca    8.0<L>      16 Sep 2023 05:34  Phos  6.0     09-16  Mg     2.2     09-16    TPro  5.4<L>  /  Alb  3.1<L>  /  TBili  28.0<H>  /  DBili  x   /  AST  102<H>  /  ALT  40  /  AlkPhos  134<H>  09-16    LIVER FUNCTIONS - ( 16 Sep 2023 05:34 )  Alb: 3.1 g/dL / Pro: 5.4 g/dL / ALK PHOS: 134 U/L / ALT: 40 U/L / AST: 102 U/L / GGT: x           PT/INR - ( 16 Sep 2023 05:34 )   PT: 22.7 sec;   INR: 2.03          PTT - ( 16 Sep 2023 05:34 )  PTT:46.6 sec    Culture - Bronchial (collected 09-15 @ 16:45)  Source: Lavage RML BAL  Gram Stain (09-15 @ 19:18):    No epithelial cells seen    Rare to few White blood cells    No organisms seen  Preliminary Report (09-16 @ 10:02):    No growth to date    INs & OUTs  Drains:       VITAL SIGNS:  ICU Vital Signs Last 24 Hrs  T(C): 35.8 (16 Sep 2023 14:32), Max: 36.1 (16 Sep 2023 00:01)  T(F): 96.4 (16 Sep 2023 14:32), Max: 97 (16 Sep 2023 00:01)  HR: 87 (16 Sep 2023 15:00) (83 - 96)  BP: 102/52 (16 Sep 2023 15:00) (88/51 - 131/71)  BP(mean): 72 (16 Sep 2023 15:00) (62 - 90)  ABP: --  ABP(mean): --  RR: 16 (16 Sep 2023 15:00) (12 - 19)  SpO2: 95% (16 Sep 2023 15:00) (92% - 100%)    O2 Parameters below as of 16 Sep 2023 15:00  Patient On (Oxygen Delivery Method): ventilator    O2 Concentration (%): 50    PHYSICAL EXAM:  GENERAL: ill appearing, intubated, sedation held for exam   HEAD:  Atraumatic   EYES: icteric sclera  ENT: oral intubation tube and gastric tube, throat packing in place, stained red, large blood clot pooled outside nose.   Nares: dried blood b/l nasal cavity   NECK: Supple,   CHEST/LUNG: on vent   ABDOMEN: large and distended but soft and non-tender.  NERVOUS SYSTEM:  patient sedation weaned, patient awake with eyes open and responds to physical stimuli, does not follow commands.   SKIN: diffuse jaundice throughout body    BEDSIDE PROCEDURE: control of nose bleed and change of throat packing  Consent was not obtained as procedure done on urgent basis to assess and control source of bleeding. Propofol was started by the primary team to bring patient to RASS-4/-5. Patient was maintained in supine position on bed. Large clot was suctioned from external nose and nasal cavity bilaterally. No active bleeding noted anteriorly and no blood appeared to be pooling in posterior nasal cavity. 5.5cm merocels (nonabsorbable) covered in bacitracin placed bilaterally in nasal cavity. Afrin used to expand the Merocels.  Large left septal spur prevented Merocel from sliding along nasal floor on left side, so left side additionally packed with 2x6cm piece of surgicel (absorbable) inferiorly along floor of nose. Some minor oozing from left nose noted after placement of merocel from possible disruption of prior clot. Next, the throat pack was removed which was intermittently saturated with old clot. The posterior oropharynx was noted to have some blood tinged secretions, but no saleem bleeding noted and no obvious mucosal injuries noted on soft palate or within oral cavity. A new Afrin-soaked Kerlix was used to pack the oropharynx and oral cavity. Approximately 2/3 of Kerlix roll used.     ASSESSMENT/PLAN:    IMPRESSION: DEON VARGAS  is a 53 year old male with hepatic chirosis, alcoholism who presented with AMS, admitted for presumed hepatic encephalopathy, coagulapathy hepatorenal syndrome to the MICU for complex medical management. CTH for report of fall is concerning for R extra-axial hematoma.  Patient s/p traumatic intubation, patient noted to have a cuff leak the day after- now s/p another intubation that was slightly traumatic. Glide scope noted to have slight oozing during intubation.  Patient not currently on any anticoagulation. Source of bleeding indeterminate at this time but patient appears to be in a coagulopathic state with multiple sites of bleeding suspected. Nasal bleeding seemed more diffuse from raw mucosa. No soft palate injury directly visualized on limited exam but could likewise have diffuse mucosal bleeding or possible laceration from intubation trauma anywhere within pharynx and larynx. Throat and nose packing in place.     RECOMMENDATIONS:  - Abx with staphylococcal coverage while throat and/or nasal pack is in place   - Packing to be changed by ENT as needed  - Please keep patient in deep sedation while throat packing is in place  - Afrin to b/l nares BID   - Mupirocin b/l nares BID  - ENT will continue to follow.

## 2023-09-16 NOTE — PROGRESS NOTE ADULT - ASSESSMENT
53M Hx of EtOH abuse and cirrhosis, anemia requiring transfusion in the past, GERD, and a recent un-witnessed fall BIBEMS for altered mental status now intubated and sedated, admitted for further management of  hepatic encephalopathy and hepatorenal syndrome 2/2 alcohol hepatitis and decompensated liver cirrhosis (MELD 41), found to have right frontal hematoma (epidural vs subdural), and possible pancreatitis.    Neuro  #Hepatic encelphalopathy  Intubated and sedated. Per wife AAOx3 at baseline. AMS unlikely 2/2 SAH vs SDH seen on CT. Ammonia 169 on admission.  - Ammonia 152 today.  - SAT and SBT.  - On rifaximin, lactulose enema, and lactulose syrup via NGT.  - Monitor for increased stool output.  - Continue thiamine, folic acid.    #Intra-cranial bleed  CT head with extra-axial hematoma in right frontal lobe. ED at [facility] discussed with neurosurgery and suspect bleed to not be causing AMS. s/p vitamin K, keppra 1g, and FFP.  - Repeat CT head o/n: no significant change in the small right frontal subdural hematoma. Increased opacification of the paranasal sinuses likely due to intubation.  - Neurosurgery onboard: recommendations appreciated.  - Cleared to re-start Heparin.  - Continue to monitor.    Cardiovascular  On Levophed for Hepatorenal syndrome.  DENNY    Pulm  #Intubated, sedated  #Possible oropharyngeal blood  ET tube was found to have a leak today 9/15. Boogie was used to exchange ET tube. Findings of considerable blood in oropharynx requiring suctioning.   -On propofol, fentanyl drips.  -ENT consulted: recommendations appreciated.    #Aspiration PNA  Found to be increasingly altered per wife and s/p unwitnessed fall day prior to admission. Received CTX 1g at [facility].  - Continue CTX 2g daily (started 9/14)  - follow up sputum culture  - SAT/SBT    GI  #Decompensated Liver Cirrhosis  #Hepatic encephalopathy - see Neuro  #?SBP  #Alcohol Hepatitis  History of chronic alcohol use presenting with hepatic encephalopathy ammonia 169 -->159 today with distended abdomen, soft and non-tender and diffusely jaundice. Bedside POCUS with small pockets of fluid. Negative hepatitis panel, HIV.  - CTX 2g daily for SBP ppx  - Prednisolone for severe alcohol hepatitis  - Trend ammonia levels qd  - MELD score 41 today  - Maddrey score 200 today  - Child Ferguson score 13-15 (unable to assess mental status while sedated)  - Continue thiamine, folic acid  - Hepatology consulted for transplant: recommendations appreciated.    #Possible Pancreatitis - unlikely.  CT abdomen with some papito-pancreatic stranding. WBC increasing to 17 today. Pt had no abdominal pain on arrival to ED. Tachypneic to 30s-40s upon abdominal palpation. Lipase 265 on admission, improving to 133.   -Merna score 0 points  - low risk for mortality, unlikely to be severe pancreatitis.  -Re-assess at 48 hours (9/16 afternoon).  -Pt has hepatorenal syndrome - cannot give maintenance fluid although clinical suspicion is low at this time.  -On fentanyl drip    #Elevated T-bili  Tbili >25, direct bili >16on admission. CTA a/p: normal biliary duct/gallbladder but has hepatic steatosis with hepatomegaly, and portal hypertension.  - T bili increasing to 28.3 today  - Trend tbili  - LDH elevated at 338, Hapto WNL at 40.    Nephro  #WILLIAM vs WILLIAM on CKD with anuria  #Hepatorenal syndrome  BUN 55, Cr 6.78 suspect hepatorenal syndrome in setting of portal hypertension with splanchnic arterial vasodilation resulting in decreased renal perfusion. CK low at 24.  - UOP 25cc since admission.   - BUN/Cr 52/5.9 today. Phos 5.4. Mg WNL. Na 120.  - On Albumin 25% IVPB 100mg q6h for 3 days.  - Norepinephrine with goal MAP 70 (10 above presenting MAP of 60)  - Nephrology onboard: recommend CVVHD.  - Strict I/Os.  - Avoid nephrotoxic med and renally dose meds.    #Hyponatremia  Na 118 on admission, received 3L NS in ED. Started 3% NS @ 25cc/hr.  - Repeat BMP q4h with goal Na 122 by 6PM 9/15, goal Na 126 by 6PM 9/16  - If Na 126 or higher can give dose of DDAVP 1mcg (can give additional dose 6 hours after if needed)  - Urine Na <20  - Urine osm 290 s/p 2L NS  - Serum osm 276  - Nephro onbaord: likely hypovolemic hyponatremia.    #NAGMA  Bicarb 18, normal AG. Pt met 2/4 SIRS criteria on admission. Had episode of loose/mucous stool.  - Trend bicarb and AG  -  Bicarb 14, AG normal today.  -  F/u C. difficile  - GI panel negative    Heme  #Anemia - stable.  Hb 7.3, .5 likely in setting of alcohol use disorder. History of hemorrhoids. Has ecchymosis on flank however no active bleed and known history of anemia. Partner denies history of esophageal varices. INR 2.5. S/p 1 unit pRBC and 1 unit FFP overnight, Hb 7.6 today.  - Continue Vitamin b12, folic acid 1mg daily.  - f/u serum b12 and folate.  - Type & Screen  - Transfuse for Hb <7 or <8 with active bleed  - LDH high, haptoglobin WNL  - Iron 89, TIBC low <17, ferritin elevated 3,417.   -PT/INR/PTT today: 24/2.15/51.4 although this is 2/2 liver dysfunction.    #Thrombocytopenia - stable.  Platelet 115, likely 2/2 alcohol use disorder. 123 today.  - Trend platelets  - Transfuse <10, <20 with fever or <50 with bleeding    ID  #Sepsis  Met 2/4 SIRS criteria (leukocytosis, tachycardic) with suspected asp PNA vs SBP. Blood and urine cultures negative at 12 hours.  - f/u sputum culture  - f/u blood, urine cultures at 48 hours  - continue CTX 2g daily  - f/u cdiff in setting of mucous/loose stool    Fluids: n/a  Electrolytes: Caution in setting of suspected WILLIAM  Nutrition: NPO  DVT ppx: Heparin 5000 q8h (prothrombotic state)  GI ppx: Protonix  Code: Full code  Dispo: MICU 53M Hx of EtOH abuse and cirrhosis, anemia requiring transfusion in the past, GERD, and a recent un-witnessed fall BIBEMS for altered mental status now intubated and sedated, admitted for further management of  hepatic encephalopathy and hepatorenal syndrome 2/2 alcohol hepatitis and decompensated liver cirrhosis (MELD 41), found to have right frontal hematoma (epidural vs subdural), and possible pancreatitis.    Neuro  #Hepatic encelphalopathy  Intubated and sedated. Per wife AAOx3 at baseline. AMS unlikely 2/2 SAH vs SDH seen on CT. Ammonia started at 169 9/14 --> 182 9/16.  - Ammonia 182 today.  - SAT and SBT.  - On rifaximin, lactulose enema, and lactulose syrup via NGT.  - Pt still has had no BM, and delayed peristalsis in colon visualized via POCUS  - Monitor for increased stool output.  - Continue thiamine, folic acid.    #Intra-cranial bleed  CT head with extra-axial hematoma in right frontal lobe. ED at [facility] discussed with neurosurgery and suspect bleed to not be causing AMS. s/p vitamin K, Keppra 1g, and FFP.  - Repeat CT head o/n: no significant change in the small right frontal subdural hematoma. Increased opacification of the paranasal sinuses likely due to intubation.  - Neurosurgery onboard: recommendations appreciated.  - Re-start Heparin.  - Continue to monitor.    ENT  #Epistaxis  -Significant bleeding at the nares w/ a large clot covering the opening of the nares, Hgb decreased to 6.8. ENT came to pack the nares.     Cardiovascular  On Levophed for Hepatorenal syndrome.  DENNY    Pulm  #Intubated, sedated  #Possible oropharyngeal blood  Findings of considerable blood in oropharynx and nasopharynx requiring suctioning.   -Restarted propofol, fentanyl drips for ENT procedure  -ENT consulted: recommendations appreciated.    #Aspiration PNA  Found to be increasingly altered per wife and s/p unwitnessed fall day prior to admission. Received CTX 1g at [facility].  - Continue CTX 2g daily (started 9/14)  - follow up sputum culture  - SAT/SBT    GI  #Decompensated Liver Cirrhosis  #Hepatic encephalopathy - see Neuro  #?SBP  #Alcohol Hepatitis  History of chronic alcohol use presenting with hepatic encephalopathy ammonia 159-->186 today with distended abdomen, soft and non-tender and diffusely jaundice. Bedside POCUS with small pockets of fluid. Negative hepatitis panel, HIV. Hepatology noted that risks outweigh benefits.  - CTX 2g daily for SBP ppx  - Prednisolone for severe alcohol hepatitis  - Trend ammonia levels qd  - MELD score 41 today  - Maddrey score 200 today  - Child Ferguson score 13-15 (unable to assess mental status while sedated)  - Continue thiamine, folic acid    #Pancreatitis   CT abdomen with some papito-pancreatic stranding. WBC decreasing to 12.8 today. Pt had no abdominal pain on arrival to ED. Tachypneic to 30s-40s upon abdominal palpation. Lipase 1586  -Norfolk score 0 points  - low risk for mortality, unlikely to be severe pancreatitis.  -Re-assess at 48 hours (9/16 afternoon).  -Pt has hepatorenal syndrome - cannot give maintenance fluid although clinical suspicion is low at this time.  -On fentanyl drip    #Elevated T-bili  Tbili >25, direct bili >16on admission. CTA a/p: normal biliary duct/gallbladder but has hepatic steatosis with hepatomegaly, and portal hypertension.  - T bili stable at 28 today  - Trend tbili  - LDH elevated at 338, Hapto WNL at 40.    Nephro  #WILLIAM vs WILLIAM on CKD with anuria  #Hepatorenal syndrome  BUN 55, Cr 6.78 suspect hepatorenal syndrome in setting of portal hypertension with splanchnic arterial vasodilation resulting in decreased renal perfusion. CK low at 24.  - Markedly low UOP @ 25cc since admission.   - Stable BUN/Cr  - On Albumin 25% IVPB 100mg q6h for 3 days. Stop 9/17  - Norepinephrine with goal MAP 70 (10 above presenting MAP of 60)  - Nephrology onboard: recommend CVVHD.  - Strict I/Os.  - Avoid nephrotoxic med and renally dose meds.    #Hyponatremia  Na 118 on admission, received 3L NS in ED. Started 3% NS @ 25cc/hr.  - Repeat BMP q4h with goal Na 122 by 6PM 9/15, goal Na 126 by 6PM 9/16  - If Na 128 or higher can give dose of DDAVP 1mcg (can give additional dose 6 hours after if needed)  - Urine Na <20  - Urine osm 290 s/p 2L NS  - Serum osm 276  - Nephro onbaord: likely hypovolemic hyponatremia.    #NAGMA  Bicarb 18, increased AG 18. Pt met 2/4 SIRS criteria on admission. Had episode of loose/mucous stool.  - Trend bicarb and AG  -  Bicarb 14, AG normal today.  -  F/u C. difficile  - GI panel negative    Heme  #Anemia - stable.  Hb 7.3, .5 likely in setting of alcohol use disorder. History of hemorrhoids. Has ecchymosis on flank however no active bleed and known history of anemia. Partner denies history of esophageal varices. INR 2.5. S/p 1 unit pRBC and 1 unit FFP overnight, Hb 7.6 today.  - Continue Vitamin b12, folic acid 1mg daily.  - f/u serum b12 and folate.  - Type & Screen  - Transfuse for Hb <7 or <8 with active bleed  - LDH high, haptoglobin WNL  - Iron 89, TIBC low <17, ferritin elevated 3,417.   - Coag dysfunction 2/2 liver dysfunction.  - Start Heparin AC    #Thrombocytopenia - stable.  Platelet 115, likely 2/2 alcohol use disorder. 123 today.  - Trend platelets  - Transfuse <10, <20 with fever or <50 with bleeding    ID  #Sepsis  Met 2/4 SIRS criteria (leukocytosis, tachycardic) with suspected asp PNA vs SBP. Blood and urine cultures negative at 12 hours.  - f/u sputum culture  - f/u blood, urine cultures at 48 hours  - continue CTX 2g daily  - f/u cdiff in setting of mucous/loose stool    Fluids: n/a  Electrolytes: Caution in setting of suspected WILLIAM  Nutrition: NPO  DVT ppx: Heparin 5000 q8h (prothrombotic state)  GI ppx: Protonix  Code: Full code  Dispo: MICU

## 2023-09-17 NOTE — PROGRESS NOTE ADULT - ASSESSMENT
53Y M now with anuric WILLIAM likley 2/2 HRS now requiring CVVHD for clearance , with  signs of recovery, continue RRT for clearance with no fluid removal      Anuric WILLIAM 2/2 HRS now requiring CVVHD   BCr not known  Cr on admission 6.32  No signs of renal recovery       Recommend:  Optimize BP. Keep MAP>70 for adequate renal perfusion  Continue with Levo, Albumin   Repeat UA and urine lytes in AM  BMP per icu protocol  Strict I&Os  CVVHD as below for clearance and with no fluid removal   CRRT Treatment:   Modality: CVVHD, Filter: NxStage CAR-505, Target Blood Flow: 200 mL/Min  Target Fluid Balance: No fluid removal

## 2023-09-17 NOTE — GOALS OF CARE CONVERSATION - ADVANCED CARE PLANNING - CONVERSATION DETAILS
Spoke extensively with patient's long term partner at the bedside Francois along with sister Bronwyn on the phone in [place] two friends at the bedside. Discussed the patient's poor prognosis. Discussed that we are still providing all medical treatments for his liver failure and multiorgan failure. Discussed code status and DNR. After extensive discussion, would like to make sure the patient is not in pain. Would like to see how the next few days go in terms of response to medical treatment. They do not want the patient to be resuscitated, and DNR order placed and MOLST filled. Will consult palliative for assistance with further management, and they would like to fill out paperwork for HCP.

## 2023-09-17 NOTE — PROGRESS NOTE ADULT - SUBJECTIVE AND OBJECTIVE BOX
Hospital course:   53M PMH of EtOH abuse and cirrhosis, anemia requiring transfusion in the past, and GERD, brought in by EMS for altered mental status. Patient's partner states that he had a mechanical fall nightprior, afterwards told her not to call EMS to take him to the hospital. On day of admission, patient was less responsive than usual, so she called EMS to bring him in.  Partner states that patient developed extensive bruising to left abdomen and flank 2 to 3 days ago, prior to the fall. Pt normally drinks 1 bottle of vodka daily but has been cutting back to a few shots. Admitted to MICU for intubation i/s/o AMS and airway protection. Found to have hepatorenal syndrome and likely hepatic encephalopathy 2/2 alcohol hepatitis and decompensated liver cirrhosis, a right frontal subdural vs epidural hematoma, WILLIAM with oliguria, and SIRS 2/2 aspiration PA vs SBP. On stable Levophed drip .2mcg/kg.    Hospital course:   53M PMH of EtOH abuse and cirrhosis, anemia requiring transfusion in the past, and GERD, brought in by EMS for altered mental status. Patient's partner states that he had a mechanical fall nightprior, afterwards told her not to call EMS to take him to the hospital. On day of admission, patient was less responsive than usual, so she called EMS to bring him in.  Partner states that patient developed extensive bruising to left abdomen and flank 2 to 3 days ago, prior to the fall. Pt normally drinks 1 bottle of vodka daily but has been cutting back to a few shots. Admitted to MICU for intubation i/s/o AMS and airway protection. Found to have hepatorenal syndrome and likely hepatic encephalopathy 2/2 alcohol hepatitis and decompensated liver cirrhosis, a right frontal subdural vs epidural hematoma, WILLIAM with oliguria, and SIRS 2/2 aspiration PA vs SBP. On Levophed drip.    INTERVAL HPI/OVERNIGHT EVENTS:   Overnight, about 500cc of dark blood removed from OGT. K 3.3 s/p Kcl 10meq x3. Deferred intermittent suction given nasal/throat packing. Repeat Hgb 7.4. HD site bleeding, stools darker. AM Hgb 7.2. AM K 3.3, repleted.    Subjective: Patient seen and examined at bedside. HD cath site has considerable bleeding. Large clots externally on the nares. 3L stool overnight --> 5L total    ICU Vital Signs Last 24 Hrs  T(C): 36.6 (17 Sep 2023 09:45), Max: 36.6 (17 Sep 2023 09:45)  T(F): 97.9 (17 Sep 2023 09:45), Max: 97.9 (17 Sep 2023 09:45)  HR: 107 (17 Sep 2023 11:00) (78 - 107)  BP: 118/63 (17 Sep 2023 11:00) (94/52 - 118/63)  BP(mean): 84 (17 Sep 2023 11:00) (67 - 84)  RR: 12 (17 Sep 2023 11:00) (12 - 21)  SpO2: 94% (17 Sep 2023 11:00) (94% - 98%)    O2 Parameters below as of 17 Sep 2023 11:00  Patient On (Oxygen Delivery Method): ventilator, CPAP    O2 Concentration (%): 50      I&O's Summary    16 Sep 2023 07:01  -  17 Sep 2023 07:00  --------------------------------------------------------  IN: 3308.2 mL / OUT: 8324 mL / NET: -5015.8 mL    17 Sep 2023 07:01  -  17 Sep 2023 12:15  --------------------------------------------------------  IN: 462 mL / OUT: 155 mL / NET: 307 mL      Mode: CPAP with PS  FiO2: 50  PEEP: 5  PS: 5  MAP: 6.6  PIP: 11      PHYSICAL EXAM:  GENERAL: ill appearing, intubated, sedation weaned  HEAD:  Atraumatic   EYES: icteric sclera  ENT: throat and nasal packing   Nares: nasal packing, dried blood externally  NECK: Supple,   CHEST/LUNG: on vent   ABDOMEN: large and distended but soft and non-tender.  NERVOUS SYSTEM:  patient sedation weaned, patient awake with eyes open and responds to physical stimuli, does not follow commands.   SKIN: diffuse jaundice throughout body    LABS:                        7.2    9.86  )-----------( 100      ( 17 Sep 2023 05:15 )             20.0     09-17    139  |  98  |  31<H>  ----------------------------<  144<H>  3.2<L>   |  22  |  3.56<H>    Ca    8.4      17 Sep 2023 05:15  Phos  2.8     09-17  Mg     2.1     09-17    TPro  7.0  /  Alb  5.2<H>  /  TBili  26.8<H>  /  DBili  x   /  AST  99<H>  /  ALT  40  /  AlkPhos  105  09-17    PT/INR - ( 17 Sep 2023 05:15 )   PT: 25.0 sec;   INR: 2.25          PTT - ( 17 Sep 2023 05:15 )  PTT:54.6 sec  Urinalysis Basic - ( 17 Sep 2023 05:15 )    Color: x / Appearance: x / SG: x / pH: x  Gluc: 144 mg/dL / Ketone: x  / Bili: x / Urobili: x   Blood: x / Protein: x / Nitrite: x   Leuk Esterase: x / RBC: x / WBC x   Sq Epi: x / Non Sq Epi: x / Bacteria: x    CAPILLARY BLOOD GLUCOSE    Consultant(s) Notes Reviewed:  [x ] YES  [ ] NO    MEDICATIONS  (STANDING):  albumin human 25% IVPB 100 milliLiter(s) IV Intermittent every 6 hours  chlorhexidine 0.12% Liquid 15 milliLiter(s) Oral Mucosa every 12 hours  chlorhexidine 2% Cloths 1 Application(s) Topical <User Schedule>  CRRT Treatment    <Continuous>  cyanocobalamin Injectable 1000 MICROGram(s) IntraMuscular every 24 hours  folic acid 1 milliGRAM(s) Oral daily  heparin   Injectable 5000 Unit(s) SubCutaneous every 8 hours  lactulose Syrup 30 Gram(s) Oral every 4 hours  levETIRAcetam  IVPB 500 milliGRAM(s) IV Intermittent every 12 hours  multivitamin 1 Tablet(s) Oral daily  mupirocin 2% Nasal 1 Application(s) Both Nostrils two times a day  nafcillin  IVPB      nafcillin  IVPB 2 Gram(s) IV Intermittent every 4 hours  norepinephrine Infusion 0.05 MICROgram(s)/kG/Min (8.25 mL/Hr) IV Continuous <Continuous>  oxymetazoline 0.05% Nasal Spray 1 Spray(s) Both Nostrils two times a day  pantoprazole  Injectable 40 milliGRAM(s) IV Push every 12 hours  Phoxillum Filtration BK 4 / 2.5 5000 milliLiter(s) (1300 mL/Hr) CRRT <Continuous>  piperacillin/tazobactam IVPB.. 4.5 Gram(s) IV Intermittent once  piperacillin/tazobactam IVPB.. 4.5 Gram(s) IV Intermittent every 12 hours  prednisoLONE    3 mG/mL Solution (ORAPRED) 40 milliGRAM(s) Oral daily  propofol Infusion 10 MICROgram(s)/kG/Min (5.28 mL/Hr) IV Continuous <Continuous>  rifAXIMin 550 milliGRAM(s) Oral two times a day    MEDICATIONS  (PRN):  sodium chloride 0.9% lock flush 10 milliLiter(s) IV Push every 1 hour PRN Pre/post blood products, medications, blood draw, and to maintain line patency      Care Discussed with Consultants/Other Providers [ x] YES  [ ] NO    RADIOLOGY & ADDITIONAL TESTS:  < from: Xray Abdomen 1 View PORTABLE -Urgent (Xray Abdomen 1 View PORTABLE -Urgent .) (09.16.23 @ 20:14) >  IMPRESSION: Mild to moderate colonic distention.Clinical correlation and   follow-up recommended       Hospital course:   53M PMH of EtOH abuse and cirrhosis, anemia requiring transfusion in the past, and GERD, brought in by EMS for altered mental status. Patient's partner states that he had a mechanical fall nightprior, afterwards told her not to call EMS to take him to the hospital. On day of admission, patient was less responsive than usual, so she called EMS to bring him in.  Partner states that patient developed extensive bruising to left abdomen and flank 2 to 3 days ago, prior to the fall. Pt normally drinks 1 bottle of vodka daily but has been cutting back to a few shots. Admitted to MICU for intubation i/s/o AMS and airway protection. Found to have hepatorenal syndrome and likely hepatic encephalopathy 2/2 alcohol hepatitis and decompensated liver cirrhosis, a right frontal subdural vs epidural hematoma, WILLIAM with oliguria, and SIRS 2/2 aspiration PA vs SBP. On Levophed drip.    INTERVAL HPI/OVERNIGHT EVENTS:   Overnight, about 500cc of dark blood removed from OGT. K 3.3 s/p Kcl 10meq x3. Deferred intermittent suction given nasal/throat packing. Repeat Hgb 7.4. HD site bleeding, stools darker. AM Hgb 7.2. AM K 3.3, repleted.    Subjective: Patient seen and examined at bedside. HD cath site has considerable bleeding. Large clots externally on the nares. 3L stool overnight --> 5L total. Goals of care discussion - Patient DNR/Intubate    ICU Vital Signs Last 24 Hrs  T(C): 36.6 (17 Sep 2023 09:45), Max: 36.6 (17 Sep 2023 09:45)  T(F): 97.9 (17 Sep 2023 09:45), Max: 97.9 (17 Sep 2023 09:45)  HR: 107 (17 Sep 2023 11:00) (78 - 107)  BP: 118/63 (17 Sep 2023 11:00) (94/52 - 118/63)  BP(mean): 84 (17 Sep 2023 11:00) (67 - 84)  RR: 12 (17 Sep 2023 11:00) (12 - 21)  SpO2: 94% (17 Sep 2023 11:00) (94% - 98%)    O2 Parameters below as of 17 Sep 2023 11:00  Patient On (Oxygen Delivery Method): ventilator, CPAP    O2 Concentration (%): 50      I&O's Summary    16 Sep 2023 07:01  -  17 Sep 2023 07:00  --------------------------------------------------------  IN: 3308.2 mL / OUT: 8324 mL / NET: -5015.8 mL    17 Sep 2023 07:01  -  17 Sep 2023 12:15  --------------------------------------------------------  IN: 462 mL / OUT: 155 mL / NET: 307 mL      Mode: CPAP with PS  FiO2: 50  PEEP: 5  PS: 5  MAP: 6.6  PIP: 11      PHYSICAL EXAM:  GENERAL: ill appearing, intubated, sedation weaned  HEAD:  Atraumatic   EYES: icteric sclera  ENT: throat and nasal packing   Nares: nasal packing, dried blood externally  NECK: Supple,   CHEST/LUNG: on vent   ABDOMEN: large and distended but soft and non-tender.  NERVOUS SYSTEM:  patient sedation weaned, patient awake with eyes open and responds to physical stimuli, does not follow commands.   SKIN: diffuse jaundice throughout body    LABS:                        7.2    9.86  )-----------( 100      ( 17 Sep 2023 05:15 )             20.0     09-17    139  |  98  |  31<H>  ----------------------------<  144<H>  3.2<L>   |  22  |  3.56<H>    Ca    8.4      17 Sep 2023 05:15  Phos  2.8     09-17  Mg     2.1     09-17    TPro  7.0  /  Alb  5.2<H>  /  TBili  26.8<H>  /  DBili  x   /  AST  99<H>  /  ALT  40  /  AlkPhos  105  09-17    PT/INR - ( 17 Sep 2023 05:15 )   PT: 25.0 sec;   INR: 2.25          PTT - ( 17 Sep 2023 05:15 )  PTT:54.6 sec  Urinalysis Basic - ( 17 Sep 2023 05:15 )    Color: x / Appearance: x / SG: x / pH: x  Gluc: 144 mg/dL / Ketone: x  / Bili: x / Urobili: x   Blood: x / Protein: x / Nitrite: x   Leuk Esterase: x / RBC: x / WBC x   Sq Epi: x / Non Sq Epi: x / Bacteria: x    CAPILLARY BLOOD GLUCOSE    Consultant(s) Notes Reviewed:  [x ] YES  [ ] NO    MEDICATIONS  (STANDING):  albumin human 25% IVPB 100 milliLiter(s) IV Intermittent every 6 hours  chlorhexidine 0.12% Liquid 15 milliLiter(s) Oral Mucosa every 12 hours  chlorhexidine 2% Cloths 1 Application(s) Topical <User Schedule>  CRRT Treatment    <Continuous>  cyanocobalamin Injectable 1000 MICROGram(s) IntraMuscular every 24 hours  folic acid 1 milliGRAM(s) Oral daily  heparin   Injectable 5000 Unit(s) SubCutaneous every 8 hours  lactulose Syrup 30 Gram(s) Oral every 4 hours  levETIRAcetam  IVPB 500 milliGRAM(s) IV Intermittent every 12 hours  multivitamin 1 Tablet(s) Oral daily  mupirocin 2% Nasal 1 Application(s) Both Nostrils two times a day  nafcillin  IVPB      nafcillin  IVPB 2 Gram(s) IV Intermittent every 4 hours  norepinephrine Infusion 0.05 MICROgram(s)/kG/Min (8.25 mL/Hr) IV Continuous <Continuous>  oxymetazoline 0.05% Nasal Spray 1 Spray(s) Both Nostrils two times a day  pantoprazole  Injectable 40 milliGRAM(s) IV Push every 12 hours  Phoxillum Filtration BK 4 / 2.5 5000 milliLiter(s) (1300 mL/Hr) CRRT <Continuous>  piperacillin/tazobactam IVPB.. 4.5 Gram(s) IV Intermittent once  piperacillin/tazobactam IVPB.. 4.5 Gram(s) IV Intermittent every 12 hours  prednisoLONE    3 mG/mL Solution (ORAPRED) 40 milliGRAM(s) Oral daily  propofol Infusion 10 MICROgram(s)/kG/Min (5.28 mL/Hr) IV Continuous <Continuous>  rifAXIMin 550 milliGRAM(s) Oral two times a day    MEDICATIONS  (PRN):  sodium chloride 0.9% lock flush 10 milliLiter(s) IV Push every 1 hour PRN Pre/post blood products, medications, blood draw, and to maintain line patency      Care Discussed with Consultants/Other Providers [ x] YES  [ ] NO    RADIOLOGY & ADDITIONAL TESTS:  < from: Xray Abdomen 1 View PORTABLE -Urgent (Xray Abdomen 1 View PORTABLE -Urgent .) (09.16.23 @ 20:14) >  IMPRESSION: Mild to moderate colonic distention.Clinical correlation and   follow-up recommended

## 2023-09-17 NOTE — PROGRESS NOTE ADULT - SUBJECTIVE AND OBJECTIVE BOX
*** DRAFT NOTE ***    HEPATOLOGY PROGRESS NOTE    INTERVAL/SUBJECTIVE:  - Pressor requirement decreasing  - Sputum Cx growing MSSA    Allergies  No Known Allergies    Intolerances    MEDICATIONS:  MEDICATIONS  (STANDING):  albumin human 25% IVPB 100 milliLiter(s) IV Intermittent every 6 hours  chlorhexidine 0.12% Liquid 15 milliLiter(s) Oral Mucosa every 12 hours  chlorhexidine 2% Cloths 1 Application(s) Topical <User Schedule>  CRRT Treatment    <Continuous>  cyanocobalamin Injectable 1000 MICROGram(s) IntraMuscular every 24 hours  folic acid 1 milliGRAM(s) Oral daily  heparin   Injectable 5000 Unit(s) SubCutaneous every 8 hours  lactulose Syrup 30 Gram(s) Oral every 4 hours  levETIRAcetam  IVPB 500 milliGRAM(s) IV Intermittent every 12 hours  multivitamin 1 Tablet(s) Oral daily  mupirocin 2% Nasal 1 Application(s) Both Nostrils two times a day  nafcillin  IVPB 2 Gram(s) IV Intermittent every 4 hours  nafcillin  IVPB      nafcillin  IVPB 2 Gram(s) IV Intermittent once  norepinephrine Infusion 0.05 MICROgram(s)/kG/Min (8.25 mL/Hr) IV Continuous <Continuous>  oxymetazoline 0.05% Nasal Spray 1 Spray(s) Both Nostrils two times a day  pantoprazole  Injectable 40 milliGRAM(s) IV Push every 12 hours  Phoxillum Filtration BK 4 / 2.5 5000 milliLiter(s) (1300 mL/Hr) CRRT <Continuous>  piperacillin/tazobactam IVPB.. 4.5 Gram(s) IV Intermittent once  piperacillin/tazobactam IVPB.. 4.5 Gram(s) IV Intermittent every 12 hours  prednisoLONE    3 mG/mL Solution (ORAPRED) 40 milliGRAM(s) Oral daily  propofol Infusion 10 MICROgram(s)/kG/Min (5.28 mL/Hr) IV Continuous <Continuous>  rifAXIMin 550 milliGRAM(s) Oral two times a day  thiamine IVPB 500 milliGRAM(s) IV Intermittent daily    MEDICATIONS  (PRN):  sodium chloride 0.9% lock flush 10 milliLiter(s) IV Push every 1 hour PRN Pre/post blood products, medications, blood draw, and to maintain line patency    Vital Signs Last 24 Hrs  T(C): 36.6 (17 Sep 2023 09:45), Max: 36.6 (17 Sep 2023 09:45)  T(F): 97.9 (17 Sep 2023 09:45), Max: 97.9 (17 Sep 2023 09:45)  HR: 106 (17 Sep 2023 10:12) (78 - 107)  BP: 99/58 (17 Sep 2023 10:00) (88/51 - 116/55)  BP(mean): 73 (17 Sep 2023 10:00) (62 - 81)  RR: 16 (17 Sep 2023 10:12) (14 - 21)  SpO2: 94% (17 Sep 2023 10:12) (94% - 98%)    Parameters below as of 17 Sep 2023 10:12  Patient On (Oxygen Delivery Method): ventilator    O2 Concentration (%): 50    09-16 @ 07:01  -  09-17 @ 07:00  --------------------------------------------------------  IN: 3308.2 mL / OUT: 8324 mL / NET: -5015.8 mL    09-17 @ 07:01  -  09-17 @ 11:12  --------------------------------------------------------  IN: 50.7 mL / OUT: 26 mL / NET: 24.7 mL    PHYSICAL EXAM:  General: intubated, sedated  Lungs: bilateral chest rise  Abdomen: nondistended, soft, nontender, No rebound or guarding  Extremities: no edema  Neurological: pupils symmetric and reactive, does not withdraw to nailbed pressure    LABS:                        7.2    9.86  )-----------( 100      ( 17 Sep 2023 05:15 )             20.0     09-17    139  |  98  |  31<H>  ----------------------------<  144<H>  3.2<L>   |  22  |  3.56<H>    Ca    8.4      17 Sep 2023 05:15  Phos  2.8     09-17  Mg     2.1     09-17    TPro  7.0  /  Alb  5.2<H>  /  TBili  26.8<H>  /  DBili  x   /  AST  99<H>  /  ALT  40  /  AlkPhos  105  09-17    PT/INR - ( 17 Sep 2023 05:15 )   PT: 25.0 sec;   INR: 2.25          PTT - ( 17 Sep 2023 05:15 )  PTT:54.6 sec      RADIOLOGY & ADDITIONAL STUDIES:  Reviewed HEPATOLOGY PROGRESS NOTE    INTERVAL/SUBJECTIVE:  - Pressor requirement decreasing  - Sputum Cx growing MSSA    Allergies  No Known Allergies    Intolerances    MEDICATIONS:  MEDICATIONS  (STANDING):  albumin human 25% IVPB 100 milliLiter(s) IV Intermittent every 6 hours  chlorhexidine 0.12% Liquid 15 milliLiter(s) Oral Mucosa every 12 hours  chlorhexidine 2% Cloths 1 Application(s) Topical <User Schedule>  CRRT Treatment    <Continuous>  cyanocobalamin Injectable 1000 MICROGram(s) IntraMuscular every 24 hours  folic acid 1 milliGRAM(s) Oral daily  heparin   Injectable 5000 Unit(s) SubCutaneous every 8 hours  lactulose Syrup 30 Gram(s) Oral every 4 hours  levETIRAcetam  IVPB 500 milliGRAM(s) IV Intermittent every 12 hours  multivitamin 1 Tablet(s) Oral daily  mupirocin 2% Nasal 1 Application(s) Both Nostrils two times a day  nafcillin  IVPB 2 Gram(s) IV Intermittent every 4 hours  nafcillin  IVPB      nafcillin  IVPB 2 Gram(s) IV Intermittent once  norepinephrine Infusion 0.05 MICROgram(s)/kG/Min (8.25 mL/Hr) IV Continuous <Continuous>  oxymetazoline 0.05% Nasal Spray 1 Spray(s) Both Nostrils two times a day  pantoprazole  Injectable 40 milliGRAM(s) IV Push every 12 hours  Phoxillum Filtration BK 4 / 2.5 5000 milliLiter(s) (1300 mL/Hr) CRRT <Continuous>  piperacillin/tazobactam IVPB.. 4.5 Gram(s) IV Intermittent once  piperacillin/tazobactam IVPB.. 4.5 Gram(s) IV Intermittent every 12 hours  prednisoLONE    3 mG/mL Solution (ORAPRED) 40 milliGRAM(s) Oral daily  propofol Infusion 10 MICROgram(s)/kG/Min (5.28 mL/Hr) IV Continuous <Continuous>  rifAXIMin 550 milliGRAM(s) Oral two times a day  thiamine IVPB 500 milliGRAM(s) IV Intermittent daily    MEDICATIONS  (PRN):  sodium chloride 0.9% lock flush 10 milliLiter(s) IV Push every 1 hour PRN Pre/post blood products, medications, blood draw, and to maintain line patency    Vital Signs Last 24 Hrs  T(C): 36.6 (17 Sep 2023 09:45), Max: 36.6 (17 Sep 2023 09:45)  T(F): 97.9 (17 Sep 2023 09:45), Max: 97.9 (17 Sep 2023 09:45)  HR: 106 (17 Sep 2023 10:12) (78 - 107)  BP: 99/58 (17 Sep 2023 10:00) (88/51 - 116/55)  BP(mean): 73 (17 Sep 2023 10:00) (62 - 81)  RR: 16 (17 Sep 2023 10:12) (14 - 21)  SpO2: 94% (17 Sep 2023 10:12) (94% - 98%)    Parameters below as of 17 Sep 2023 10:12  Patient On (Oxygen Delivery Method): ventilator    O2 Concentration (%): 50    09-16 @ 07:01  -  09-17 @ 07:00  --------------------------------------------------------  IN: 3308.2 mL / OUT: 8324 mL / NET: -5015.8 mL    09-17 @ 07:01  -  09-17 @ 11:12  --------------------------------------------------------  IN: 50.7 mL / OUT: 26 mL / NET: 24.7 mL    PHYSICAL EXAM:  General: intubated, sedated  Lungs: bilateral chest rise  Abdomen: nondistended, soft, nontender, No rebound or guarding  Extremities: no edema  Neurological: pupils symmetric and reactive, does not withdraw to nailbed pressure    LABS:                        7.2    9.86  )-----------( 100      ( 17 Sep 2023 05:15 )             20.0     09-17    139  |  98  |  31<H>  ----------------------------<  144<H>  3.2<L>   |  22  |  3.56<H>    Ca    8.4      17 Sep 2023 05:15  Phos  2.8     09-17  Mg     2.1     09-17    TPro  7.0  /  Alb  5.2<H>  /  TBili  26.8<H>  /  DBili  x   /  AST  99<H>  /  ALT  40  /  AlkPhos  105  09-17    PT/INR - ( 17 Sep 2023 05:15 )   PT: 25.0 sec;   INR: 2.25          PTT - ( 17 Sep 2023 05:15 )  PTT:54.6 sec      RADIOLOGY & ADDITIONAL STUDIES:  Reviewed

## 2023-09-17 NOTE — PROGRESS NOTE ADULT - ASSESSMENT
53M h/o EtOH cirrhosis, anemia, GERD p/w AMS (s/p intubation), found with acute renal failure, coagulopathy, and MSSA PNA.    Unclear baseline labs, prior workup, or prior history of decompensation. No acute hepatitis. No tappable pocket, no PVT. Renal failure in cirrhosis portends poor prognosis though currently with some signs of improvement with treatment of PNA.    Transplant candidacy TBD -- to discuss with Dr. Holloway.    Recommendations:  - Agree with broad infectious workup, empiric abx, and would initiate fungal coverage  - Continue norepi / albumin and can add vasopressin for hepatorenal syndrome treatment, but not a candidate for terlipressin given Cr >5 and vent support  - Trend CBC and agree with IV PPI for now. Please start octreotide gtt and call GI if evidence of GI bleed  - Continue treatment of HE with PO lactulose + rifaximin  - Would be cautious of steroids given renal failure and bleeding, and if continue would watch very closely for bleeding or infection    We will continue to follow.    Simon (Kishan) MD [facility]  Gastroenterology Fellow  Pager (M-F 7a-5p): [phone]  Pager (after hours): Please call  for on-call fellow 53M h/o EtOH cirrhosis, anemia, GERD p/w AMS (s/p intubation), found with acute renal failure, coagulopathy, and MSSA PNA.    Unclear baseline labs, prior workup, or prior history of decompensation. No acute hepatitis. No tappable pocket, no PVT. Renal failure in cirrhosis portends poor prognosis though currently with some signs of improvement with treatment of PNA.    While pt has anemia requiring transfusion, variceal bleed appears less likely given stable/decreasing pressor requirements.    Transplant candidacy TBD -- to discuss with Dr. Holloway.    Recommendations:  - Agree with broad infectious workup, empiric abx, and would initiate fungal coverage  - Continue norepi / albumin and can add vasopressin for hepatorenal syndrome treatment, but not a candidate for terlipressin given Cr >5 and vent support  - Trend CBC, agree with IV PPI BID, and can start octreotide gtt. Call GI if evidence of GI bleed  - Continue treatment of HE with PO lactulose + rifaximin  - Would be cautious of steroids given renal failure and bleeding, and if continue would watch very closely for bleeding or infection    We will continue to follow.    Simon (Kishan) MD [facility]  Gastroenterology Fellow  Pager (M-F 7a-5p): [phone]  Pager (after hours): Please call  for on-call fellow

## 2023-09-17 NOTE — PROGRESS NOTE ADULT - SUBJECTIVE AND OBJECTIVE BOX
Patient is a 53y Male seen and evaluated at bedside. Intubated, tolerating CVVHD, 5L of diarrhea overnight, continue CVVHD.      Meds:    albumin human 25% IVPB 100 every 6 hours  chlorhexidine 0.12% Liquid 15 every 12 hours  chlorhexidine 2% Cloths 1 <User Schedule>  CRRT Treatment  <Continuous>  cyanocobalamin Injectable 1000 every 24 hours  folic acid 1 daily  heparin   Injectable 5000 every 8 hours  lactulose Syrup 30 every 4 hours  levETIRAcetam  IVPB 500 every 12 hours  multivitamin 1 daily  mupirocin 2% Nasal 1 two times a day  norepinephrine Infusion 0.05 <Continuous>  oxymetazoline 0.05% Nasal Spray 1 two times a day  pantoprazole  Injectable 40 every 12 hours  piperacillin/tazobactam IVPB.. 4.5 once  piperacillin/tazobactam IVPB.. 4.5 every 12 hours  prednisoLONE    3 mG/mL Solution (ORAPRED) 40 daily  propofol Infusion 10 <Continuous>  PureFlow Dialysate RFP-400 (K 2 / Ca 3) 5000 <Continuous>  rifAXIMin 550 two times a day  sodium chloride 0.9% lock flush 10 every 1 hour PRN  thiamine IVPB 500 daily      T(C): , Max: 36.6 (09-17-23 @ 09:45)  T(F): , Max: 97.9 (09-17-23 @ 09:45)  HR: 107 (09-17-23 @ 10:00)  BP: 99/58 (09-17-23 @ 10:00)  BP(mean): 73 (09-17-23 @ 10:00)  RR: 14 (09-17-23 @ 10:00)  SpO2: 94% (09-17-23 @ 10:00)  Wt(kg): --    09-16 @ 07:01  -  09-17 @ 07:00  --------------------------------------------------------  IN: 3308.2 mL / OUT: 8324 mL / NET: -5015.8 mL    09-17 @ 07:01  -  09-17 @ 10:11  --------------------------------------------------------  IN: 50.7 mL / OUT: 26 mL / NET: 24.7 mL          Review of Systems:  ROS negative except as per HPI      PHYSICAL EXAM:  GENERAL: ill appearing, jaundiced, Intubated, tolerating CVVHD   HEENT:  icteric sclera, blood   CHEST/LUNG: Clear to auscultation bilaterally; No rales, rhonchi, wheezing or rubs. on MV  HEART: Regular rate and rhythm   ABDOMEN: abdomen large and distended but soft.  EXTREMITIES: . No clubbing, cyanosis, or edema   NERVOUS SYSTEM:  sedated.  SKIN: diffuse jaundice  ecchymosis on L posterior thorax.  Access: [facility] HD cath accessed     LABS:                        7.2    9.86  )-----------( 100      ( 17 Sep 2023 05:15 )             20.0     09-17    139  |  98  |  31<H>  ----------------------------<  144<H>  3.2<L>   |  22  |  3.56<H>    Ca    8.4      17 Sep 2023 05:15  Phos  2.8     09-17  Mg     2.1     09-17    TPro  7.0  /  Alb  5.2<H>  /  TBili  26.8<H>  /  DBili  x   /  AST  99<H>  /  ALT  40  /  AlkPhos  105  09-17      PT/INR - ( 17 Sep 2023 05:15 )   PT: 25.0 sec;   INR: 2.25          PTT - ( 17 Sep 2023 05:15 )  PTT:54.6 sec  Urinalysis Basic - ( 17 Sep 2023 05:15 )    Color: x / Appearance: x / SG: x / pH: x  Gluc: 144 mg/dL / Ketone: x  / Bili: x / Urobili: x   Blood: x / Protein: x / Nitrite: x   Leuk Esterase: x / RBC: x / WBC x   Sq Epi: x / Non Sq Epi: x / Bacteria: x            RADIOLOGY & ADDITIONAL STUDIES:

## 2023-09-17 NOTE — PROGRESS NOTE ADULT - SUBJECTIVE AND OBJECTIVE BOX
09-16 Patient seen at bedside. Had additional bleeding from mouth and nose last night with large clots pooling outside nose. Bleeding also noted from dialysis port and ETT.. Hg 7.5-> 6.8. Primary team concerned for DIC, labs sent. Throat pack remains in place. Patient taken off sedation and more awake today.  09-17 Patient seen at bedside. Throat pack replaced and nasal cavity packed with Merocels yesterday. Some additional oozing from nose on exam this morning with large dried clot externally. Throat pack seems dry. Per team, GOC discussion to be had today    ALLERGIES  No Known Allergies    MEDICATIONS  (STANDING):  albumin human 25% IVPB 100 milliLiter(s) IV Intermittent every 6 hours  chlorhexidine 0.12% Liquid 15 milliLiter(s) Oral Mucosa every 12 hours  chlorhexidine 2% Cloths 1 Application(s) Topical <User Schedule>  CRRT Treatment    <Continuous>  cyanocobalamin Injectable 1000 MICROGram(s) IntraMuscular every 24 hours  folic acid 1 milliGRAM(s) Oral daily  heparin   Injectable 5000 Unit(s) SubCutaneous every 8 hours  lactulose Syrup 30 Gram(s) Oral every 4 hours  levETIRAcetam  IVPB 500 milliGRAM(s) IV Intermittent every 12 hours  multivitamin 1 Tablet(s) Oral daily  mupirocin 2% Nasal 1 Application(s) Both Nostrils two times a day  nafcillin  IVPB 2 Gram(s) IV Intermittent every 4 hours  nafcillin  IVPB 2 Gram(s) IV Intermittent once  nafcillin  IVPB      norepinephrine Infusion 0.05 MICROgram(s)/kG/Min (8.25 mL/Hr) IV Continuous <Continuous>  oxymetazoline 0.05% Nasal Spray 1 Spray(s) Both Nostrils two times a day  pantoprazole  Injectable 40 milliGRAM(s) IV Push every 12 hours  Phoxillum Filtration BK 4 / 2.5 5000 milliLiter(s) (1300 mL/Hr) CRRT <Continuous>  piperacillin/tazobactam IVPB.. 4.5 Gram(s) IV Intermittent once  piperacillin/tazobactam IVPB.. 4.5 Gram(s) IV Intermittent every 12 hours  prednisoLONE    3 mG/mL Solution (ORAPRED) 40 milliGRAM(s) Oral daily  propofol Infusion 10 MICROgram(s)/kG/Min (5.28 mL/Hr) IV Continuous <Continuous>  rifAXIMin 550 milliGRAM(s) Oral two times a day  thiamine IVPB 500 milliGRAM(s) IV Intermittent daily    MEDICATIONS  (PRN):  sodium chloride 0.9% lock flush 10 milliLiter(s) IV Push every 1 hour PRN Pre/post blood products, medications, blood draw, and to maintain line patency    LABS                         7.2    9.86  )-----------( 100      ( 17 Sep 2023 05:15 )             20.0    09-17    139  |  98  |  31<H>  ----------------------------<  144<H>  3.2<L>   |  22  |  3.56<H>    Ca    8.4      17 Sep 2023 05:15  Phos  2.8     09-17  Mg     2.1     09-17    TPro  7.0  /  Alb  5.2<H>  /  TBili  26.8<H>  /  DBili  x   /  AST  99<H>  /  ALT  40  /  AlkPhos  105  09-17    LIVER FUNCTIONS - ( 17 Sep 2023 05:15 )  Alb: 5.2 g/dL / Pro: 7.0 g/dL / ALK PHOS: 105 U/L / ALT: 40 U/L / AST: 99 U/L / GGT: x           PT/INR - ( 17 Sep 2023 05:15 )   PT: 25.0 sec;   INR: 2.25          PTT - ( 17 Sep 2023 05:15 )  PTT:54.6 sec        INs & OUTs  Drains:       VITAL SIGNS:  ICU Vital Signs Last 24 Hrs  T(C): 36.6 (17 Sep 2023 09:45), Max: 36.6 (17 Sep 2023 09:45)  T(F): 97.9 (17 Sep 2023 09:45), Max: 97.9 (17 Sep 2023 09:45)  HR: 106 (17 Sep 2023 10:12) (78 - 107)  BP: 99/58 (17 Sep 2023 10:00) (88/51 - 116/55)  BP(mean): 73 (17 Sep 2023 10:00) (62 - 81)  ABP: --  ABP(mean): --  RR: 16 (17 Sep 2023 10:12) (14 - 21)  SpO2: 94% (17 Sep 2023 10:12) (94% - 98%)    O2 Parameters below as of 17 Sep 2023 10:12  Patient On (Oxygen Delivery Method): ventilator    O2 Concentration (%): 50    PHYSICAL EXAM:  GENERAL: ill appearing, intubated, sedation held for exam   HEAD:  Atraumatic   EYES: icteric sclera  ENT: throat and nasal packing   Nares: nasal packing, dried blood externally  NECK: Supple,   CHEST/LUNG: on vent   ABDOMEN: large and distended but soft and non-tender.  NERVOUS SYSTEM:  patient sedation weaned, patient awake with eyes open and responds to physical stimuli, does not follow commands.   SKIN: diffuse jaundice throughout body    ASSESSMENT/PLAN:    IMPRESSION: DEON VARGAS  is a 53 year old male with hepatic chirosis, alcoholism who presented with AMS, admitted for presumed hepatic encephalopathy, coagulapathy hepatorenal syndrome to the MICU for complex medical management. CTH for report of fall is concerning for R extra-axial hematoma.  Patient s/p traumatic intubation, patient noted to have a cuff leak the day after- now s/p another intubation that was slightly traumatic. Glide scope noted to have slight oozing during intubation.  Patient not currently on any anticoagulation. Source of bleeding indeterminate at this time but patient appears to be in a coagulopathic state with multiple sites of bleeding suspected. Nasal bleeding seemed more diffuse from raw mucosa. No soft palate injury directly visualized on limited exam but could likewise have diffuse mucosal bleeding or possible laceration from intubation trauma anywhere within pharynx and larynx. Throat and nose packing in place.     RECOMMENDATIONS:  - Abx with staphylococcal coverage while throat and/or nasal pack is in place   - Packing to be changed by ENT as needed - will keep another 24hr  - Please keep patient in deep sedation while throat packing is in place  - Afrin to b/l nares BID   - Mupirocin b/l nares BID  - ENT will continue to follow.

## 2023-09-17 NOTE — PROGRESS NOTE ADULT - ASSESSMENT
53M Hx of EtOH abuse and cirrhosis, anemia requiring transfusion in the past, GERD, and a recent un-witnessed fall BIBEMS for altered mental status now intubated and sedated, admitted for further management of  hepatic encephalopathy and hepatorenal syndrome 2/2 alcohol hepatitis and decompensated liver cirrhosis (MELD 41), found to have right frontal hematoma (epidural vs subdural), and possible pancreatitis.    Neuro  #Hepatic encelphalopathy  Intubated and sedated. Per wife AAOx3 at baseline. AMS unlikely 2/2 SAH vs SDH seen on CT. Ammonia started at 169 9/14 --> 182 9/16.  - Ammonia 120 --> No need to trend.  - Patient is on sedation holiday  - On rifaximin and lactulose syrup via NGT.  - Pt still has had no BM, and delayed peristalsis in colon visualized via POCUS  - Monitor for increased stool output.  - Continue thiamine, folic acid.    #Intra-cranial bleed  CT head with extra-axial hematoma in right frontal lobe. ED at [facility] discussed with neurosurgery and suspect bleed to not be causing AMS. s/p vitamin K, Keppra 1g, and FFP.  - Repeat CT head o/n: no significant change in the small right frontal subdural hematoma. Increased opacification of the paranasal sinuses likely due to intubation.  - Neurosurgery onboard: recommendations appreciated.  - Re-start Heparin.  - Continue to monitor.    ENT  #Epistaxis  -Significant bleeding at the nares w/ a large clot covering the opening of the nares, Hgb decreased to 6.8. ENT came to pack the nares.     Cardiovascular  On Levophed for Hepatorenal syndrome.  DENNY    Pulm  #Intubated, sedated  #Possible oropharyngeal blood  Findings of considerable blood in oropharynx and nasopharynx requiring suctioning.   -Restarted propofol, fentanyl drips for ENT procedure  -ENT consulted: recommendations appreciated.    #Aspiration PNA  Found to be increasingly altered per wife and s/p unwitnessed fall day prior to admission. Received CTX 1g at [facility].  - Continue CTX 2g daily (started 9/14)  - follow up sputum culture  - SAT/SBT    GI  #Decompensated Liver Cirrhosis  #Hepatic encephalopathy - see Neuro  #?SBP  #Alcohol Hepatitis  History of chronic alcohol use presenting with hepatic encephalopathy ammonia 159-->186 today with distended abdomen, soft and non-tender and diffusely jaundice. Bedside POCUS with small pockets of fluid. Negative hepatitis panel, HIV. Hepatology noted that risks outweigh benefits.  - CTX 2g daily for SBP ppx  - Prednisolone for severe alcohol hepatitis  - Trend ammonia levels qd  - MELD score 41 today  - Maddrey score 200 today  - Child Ferguson score 13-15 (unable to assess mental status while sedated)  - Continue thiamine, folic acid    #Pancreatitis   CT abdomen with some papito-pancreatic stranding. WBC decreasing to 12.8 today. Pt had no abdominal pain on arrival to ED. Tachypneic to 30s-40s upon abdominal palpation. Lipase 1586  -[place] score 0 points  - low risk for mortality, unlikely to be severe pancreatitis.  -Re-assess at 48 hours (9/16 afternoon).  -Pt has hepatorenal syndrome - cannot give maintenance fluid although clinical suspicion is low at this time.  -On fentanyl drip    #Elevated T-bili  Tbili >25, direct bili >16on admission. CTA a/p: normal biliary duct/gallbladder but has hepatic steatosis with hepatomegaly, and portal hypertension.  - T bili stable at 28 today  - Trend tbili  - LDH elevated at 338, Hapto WNL at 40.    Nephro  #WILLIAM vs WILLIAM on CKD with anuria  #Hepatorenal syndrome  BUN 55, Cr 6.78 suspect hepatorenal syndrome in setting of portal hypertension with splanchnic arterial vasodilation resulting in decreased renal perfusion. CK low at 24.  - Markedly low UOP @ 25cc since admission.   - Stable BUN/Cr  - On Albumin 25% IVPB 100mg q6h for 3 days. Stop 9/17  - Norepinephrine with goal MAP 70 (10 above presenting MAP of 60)  - Nephrology onboard: recommend CVVHD.  - Strict I/Os.  - Avoid nephrotoxic med and renally dose meds.    #Hyponatremia  Na 118 on admission, received 3L NS in ED. Started 3% NS @ 25cc/hr.  - Repeat BMP q4h with goal Na 122 by 6PM 9/15, goal Na 126 by 6PM 9/16  - If Na 128 or higher can give dose of DDAVP 1mcg (can give additional dose 6 hours after if needed)  - Urine Na <20  - Urine osm 290 s/p 2L NS  - Serum osm 276  - Nephro onbaord: likely hypovolemic hyponatremia.    #NAGMA  Bicarb 18, increased AG 18. Pt met 2/4 SIRS criteria on admission. Had episode of loose/mucous stool.  - Trend bicarb and AG  -  Bicarb 14, AG normal today.  -  F/u C. difficile  - GI panel negative    Heme  #Anemia - stable.  Hb 7.3, .5 likely in setting of alcohol use disorder. History of hemorrhoids. Has ecchymosis on flank however no active bleed and known history of anemia. Partner denies history of esophageal varices. INR 2.5. S/p 1 unit pRBC and 1 unit FFP overnight, Hb 7.6 today.  - Continue Vitamin b12, folic acid 1mg daily.  - f/u serum b12 and folate.  - Type & Screen  - Transfuse for Hb <7 or <8 with active bleed  - LDH high, haptoglobin WNL  - Iron 89, TIBC low <17, ferritin elevated 3,417.   - Coag dysfunction 2/2 liver dysfunction.  - Start Heparin AC    #Thrombocytopenia - stable.  Platelet 115, likely 2/2 alcohol use disorder. 123 today.  - Trend platelets  - Transfuse <10, <20 with fever or <50 with bleeding    ID  #Sepsis  Met 2/4 SIRS criteria (leukocytosis, tachycardic) with suspected asp PNA vs SBP. Blood and urine cultures negative at 12 hours.  - f/u sputum culture  - f/u blood, urine cultures at 48 hours  - continue CTX 2g daily  - f/u cdiff in setting of mucous/loose stool    Fluids: n/a  Electrolytes: Caution in setting of suspected WILLIAM  Nutrition: NPO  DVT ppx: Heparin 5000 q8h (prothrombotic state)  GI ppx: Protonix  Code: Full code  Dispo: MICU   53M Hx of EtOH abuse and cirrhosis, anemia requiring transfusion in the past, GERD, and a recent un-witnessed fall BIBEMS for altered mental status now intubated and sedated, admitted for further management of  hepatic encephalopathy and hepatorenal syndrome 2/2 alcohol hepatitis and decompensated liver cirrhosis (MELD 41), found to have right frontal hematoma (epidural vs subdural), and possible pancreatitis. Patient DNR/Intubate    Neuro  #Hepatic encelphalopathy  Intubated and sedated. Per wife AAOx3 at baseline. AMS unlikely 2/2 SAH vs SDH seen on CT. Ammonia started at 169 9/14 --> 182 9/16.  - Ammonia 120 --> No need to trend.  - Patient is on sedation holiday  - On rifaximin and lactulose syrup via NGT. D/c'ed lactulose enema  - Monitor stool output.  - Continue thiamine, folic acid.    #Intra-cranial bleed  CT head with extra-axial hematoma in right frontal lobe. ED at [facility] discussed with neurosurgery and suspect bleed to not be causing AMS. s/p vitamin K, Keppra 1g, and FFP.  - Repeat CT head o/n: no significant change in the small right frontal subdural hematoma. Increased opacification of the paranasal sinuses likely due to intubation.  - Neurosurgery onboard: recommendations appreciated.  - c/w Heparin.  - Continue to monitor.    ENT  #Epistaxis  -Significant bleeding at the nares w/ a large clot covering the opening of the nares, Hgb decreased to 6.8. ENT came to pack the nares.  - ENT recs appreciated    Cardiovascular  On Levophed for Hepatorenal syndrome.  DENNY    Pulm  #Intubated  #Possible oropharyngeal blood  Findings of considerable blood in oropharynx and nasopharynx requiring suctioning.   -Restarted propofol, fentanyl drips for ENT procedure  -ENT consulted: recommendations appreciated.    #Aspiration PNA  Found to be increasingly altered per wife and s/p unwitnessed fall day prior to admission. Received CTX 1g at [facility].  - Continue Zosyn daily  - sputum culture positive for MSSA - start nafcillin    GI  #Decompensated Liver Cirrhosis  #Hepatic encephalopathy - see Neuro  #?SBP  #Alcohol Hepatitis  History of chronic alcohol use presenting with hepatic encephalopathy ammonia 159-->186 today with distended abdomen, soft and non-tender and diffusely jaundice. Bedside POCUS with small pockets of fluid. Negative hepatitis panel, HIV. Hepatology noted that risks outweigh benefits.  - Prednisolone for severe alcohol hepatitis  - MELD score 41 today  - Maddrey score 200 today  - Child Ferguson score 13-15 (unable to assess mental status while sedated)  - Continue thiamine, folic acid  - GI consult for varices - request EGD    #Pancreatitis   CT abdomen with some papito-pancreatic stranding. WBC decreasing to 12.8 today. Pt had no abdominal pain on arrival to ED. Tachypneic to 30s-40s upon abdominal palpation. Lipase 1586  -Merna score 0 points  - low risk for mortality, unlikely to be severe pancreatitis.  -Re-assess at 48 hours (9/16 afternoon).  -Pt has hepatorenal syndrome - cannot give maintenance fluid although clinical suspicion is low at this time.  -On fentanyl drip    #Elevated T-bili  Tbili >25, direct bili >16on admission. CTA a/p: normal biliary duct/gallbladder but has hepatic steatosis with hepatomegaly, and portal hypertension.  - T bili stable at 26.8 today  - Trend tbili  - LDH elevated at 248, Hapto WNL at 40.    Nephro  #WILLIAM vs WILLIAM on CKD with anuria  #Hepatorenal syndrome  BUN 55, Cr 6.78 suspect hepatorenal syndrome in setting of portal hypertension with splanchnic arterial vasodilation resulting in decreased renal perfusion. CK low at 24.  - Markedly low UOP @ 25cc since admission.   - Stable BUN/Cr  - On Albumin 25% IVPB 100mg q6h for 3 days. Last day today 9/17  - Norepinephrine with goal MAP 70 (10 above presenting MAP of 60)  - Nephrology onboard: recommend CVVHD.  - Strict I/Os.  - Avoid nephrotoxic med and renally dose meds.    #Hyponatremia - RESOLVED  Na 118 on admission, received 3L NS in ED. Started 3% NS @ 25cc/hr.  - Repeat BMP q4h with goal Na 122 by 6PM 9/15, goal Na 126 by 6PM 9/16  - If Na 128 or higher can give dose of DDAVP 1mcg (can give additional dose 6 hours after if needed)  - Urine Na <20  - Urine osm 290 s/p 2L NS  - Serum osm 276  - Nephro onbaord: likely hypovolemic hyponatremia.    #NAGMA - now HAHMA  Bicarb 18, increased AG 18. Pt met 2/4 SIRS criteria on admission. Had episode of loose/mucous stool.  - Trend bicarb and AG  -  Bicarb 22, AG 19 today.  -  F/u C. difficile  - GI panel negative    Heme  #Anemia - stable.  Hb 7.3, .5 likely in setting of alcohol use disorder. History of hemorrhoids. Has ecchymosis on flank however no active bleed and known history of anemia. Partner denies history of esophageal varices. INR 2.5. S/p 1 unit pRBC and 1 unit FFP overnight, Hb 7.6 today.  - Continue Vitamin b12, folic acid 1mg daily.  - f/u serum b12 and folate.  - Type & Screen  - Transfuse for Hb <7 or <8 with active bleed  - LDH high, haptoglobin WNL  - Iron 89, TIBC low <17, ferritin elevated 3,417.   - Coag dysfunction 2/2 liver dysfunction.  - Start Heparin AC    #Thrombocytopenia - stable.  Platelet 115, likely 2/2 alcohol use disorder. 123 today.  - Trend platelets  - Transfuse <10, <20 with fever or <50 with bleeding    ID  #Sepsis  Met 2/4 SIRS criteria (leukocytosis, tachycardic) with suspected asp PNA vs SBP. Blood and urine cultures negative at 12 hours.  Sputum culture positive for MSSA  - f/u blood, urine cultures at 48 hours  - continue nafcillin  - continue zosyn  - f/u cdiff in setting of mucous/loose stool    Fluids: n/a  Electrolytes: Caution in setting of suspected WILLIAM  Nutrition: NPO  DVT ppx: Heparin 5000 q8h (prothrombotic state)  GI ppx: Protonix  Code: DNR/Intubate  Dispo: MICU

## 2023-09-18 NOTE — PROGRESS NOTE ADULT - SUBJECTIVE AND OBJECTIVE BOX
Patient is evaluated at bedside. Intubated, tolerating CVVHD.     Review of Systems:  Unable to obtain.     PHYSICAL EXAM:  GENERAL: Sedated, ETT/MV, requiring pressors, critically ill.   HEENT:  icteric sclera, dry blood around his mouth.   CHEST/LUNG: Clear to auscultation bilaterally; no rales, rhonchi, wheezing or rubs.   HEART: Regular rate and rhythm, no murmurs.   ABDOMEN: distended, tympanic but soft.  EXTREMITIES: . No clubbing, cyanosis, or edema   NERVOUS SYSTEM:  RAAS -3, sedated.   SKIN: diffuse jaundice  Access: [facility] HD cath accessed, placed on 9/15.    Allergies:  No Known Allergies    Hospital Medications:   MEDICATIONS  (STANDING):  chlorhexidine 0.12% Liquid 15 milliLiter(s) Oral Mucosa every 12 hours  chlorhexidine 2% Cloths 1 Application(s) Topical <User Schedule>  CRRT Treatment    <Continuous>  cyanocobalamin Injectable 1000 MICROGram(s) IntraMuscular every 24 hours  folic acid 1 milliGRAM(s) Oral daily  heparin   Injectable 5000 Unit(s) SubCutaneous every 8 hours  lactulose Syrup 30 Gram(s) Oral every 4 hours  levETIRAcetam  IVPB 500 milliGRAM(s) IV Intermittent every 12 hours  multivitamin 1 Tablet(s) Oral daily  mupirocin 2% Nasal 1 Application(s) Both Nostrils two times a day  nafcillin  IVPB 2 Gram(s) IV Intermittent every 4 hours  nafcillin  IVPB      norepinephrine Infusion 0.05 MICROgram(s)/kG/Min (8.25 mL/Hr) IV Continuous <Continuous>  octreotide  Infusion 50 MICROgram(s)/Hr (10 mL/Hr) IV Continuous <Continuous>  oxymetazoline 0.05% Nasal Spray 1 Spray(s) Both Nostrils two times a day  pantoprazole  Injectable 40 milliGRAM(s) IV Push every 12 hours  Phoxillum Filtration BK 4 / 2.5 5000 milliLiter(s) (1300 mL/Hr) CRRT <Continuous>  piperacillin/tazobactam IVPB.. 4.5 Gram(s) IV Intermittent every 8 hours  prednisoLONE    3 mG/mL Solution (ORAPRED) 40 milliGRAM(s) Oral daily  propofol Infusion 10 MICROgram(s)/kG/Min (5.28 mL/Hr) IV Continuous <Continuous>  rifAXIMin 550 milliGRAM(s) Oral two times a day    VITALS:  T(F): 96.8 (09-18-23 @ 10:01), Max: 99.1 (09-17-23 @ 14:09)  HR: 101 (09-18-23 @ 11:00)  BP: 102/61 (09-18-23 @ 11:00)  RR: 11 (09-18-23 @ 11:00)  SpO2: 96% (09-18-23 @ 11:00)  Wt(kg): --    09-16 @ 07:01  -  09-17 @ 07:00  --------------------------------------------------------  IN: 3308.2 mL / OUT: 8324 mL / NET: -5015.8 mL    09-17 @ 07:01  -  09-18 @ 07:00  --------------------------------------------------------  IN: 3004.4 mL / OUT: 8615 mL / NET: -5610.6 mL    09-18 @ 07:01  -  09-18 @ 11:30  --------------------------------------------------------  IN: 476 mL / OUT: 299 mL / NET: 177 mL      LABS:  09-18    139  |  102  |  27<H>  ----------------------------<  113<H>  3.7   |  23  |  3.09<H>    Ca    9.0      18 Sep 2023 05:00  Phos  2.2     09-18  Mg     1.9     09-18    TPro  5.7<L>  /  Alb  4.0  /  TBili  27.4<H>  /  DBili      /  AST  105<H>  /  ALT  42  /  AlkPhos  97  09-18                          8.3    12.13 )-----------( 73       ( 18 Sep 2023 05:00 )             23.7

## 2023-09-18 NOTE — PROCEDURE NOTE - NSUSDIAGNOSIS_ED_ALL
Grossly Normal Function
Grossly Normal Function
Pleural Effusion Right
Grossly Normal Function
Grossly Normal Function

## 2023-09-18 NOTE — PROGRESS NOTE ADULT - ASSESSMENT
53M h/o EtOH cirrhosis, anemia, GERD p/w AMS (s/p intubation), found with acute renal failure, coagulopathy, and MSSA PNA.    Unclear baseline labs, prior workup, or prior history of decompensation. No acute hepatitis. No tappable pocket, no PVT. Renal failure in cirrhosis portends poor prognosis though currently with some signs of improvement with treatment of PNA.    While pt has anemia requiring transfusion, variceal bleed appears less likely given stable/decreasing pressor requirements. More likely due to ongoing oozing from nasal region and oropharynx.     Can further evaluate transplant candidacy if he is extubated.    Recommendations:  - Agree with broad infectious workup, empiric abx, and would initiate fungal coverage  - Continue norepi / albumin and can add vasopressin for hepatorenal syndrome treatment, but not a candidate for terlipressin given Cr >5 and vent support  - Trend CBC, agree with IV PPI BID, and OK with octreotide gtt. Call GI if evidence of GI bleed  - Continue treatment of HE with PO lactulose + rifaximin  - Would be cautious of steroids given renal failure and bleeding, and if continue would watch very closely for bleeding or infection    We will continue to follow. Please see attending addendum for final recommendations.    Simon (Kishan) MD Phillip  Gastroenterology Fellow  Pager (M-F 7a-5p): [phone]  Pager (after hours): Please call  for on-call fellow

## 2023-09-18 NOTE — PROGRESS NOTE ADULT - ASSESSMENT
53M Hx of EtOH abuse and cirrhosis, anemia requiring transfusion in the past, GERD, and a recent un-witnessed fall BIBEMS for altered mental status now intubated and sedated, admitted for further management of  hepatic encephalopathy and hepatorenal syndrome 2/2 alcohol hepatitis and decompensated liver cirrhosis (MELD 41), found to have right frontal hematoma (epidural vs subdural), and possible pancreatitis. Patient DNR/Intubate    Neuro  #Hepatic encelphalopathy  Intubated and sedated. Per wife AAOx3 at baseline. AMS unlikely 2/2 SAH vs SDH seen on CT. Ammonia started at 169 9/14 --> 182 9/16. No need to trend  - Patient is on sedation holiday --> Evaluate mental status while off sedation  - On rifaximin and lactulose syrup via NGT.  - Monitor stool output.  - Continue thiamine, folic acid.    #Intra-cranial bleed  CT head with extra-axial hematoma in right frontal lobe. ED at [facility] discussed with neurosurgery and suspect bleed to not be causing AMS. s/p vitamin K, Keppra 1g, and FFP.  - Repeat CT head o/n: no significant change in the small right frontal subdural hematoma. Increased opacification of the paranasal sinuses likely due to intubation.  - Neurosurgery onboard: DENNY  - c/w Heparin.  - Continue to monitor.    HEENT  #Epistaxis  No additional bleeding. Mouth unpacked. Continued bleeding at the nares rocket inserted, Hgb decreased to 8.3 - 3u pRBC given thus far. ENT came to unpack the nares and throat.   - Plan to wean sedation  - Plan to extubate tomorrow - ENT to unpack nose and mouth as tolerated    Cardiovascular  On Levophed for Hepatorenal syndrome. Slightly increased requirements low suspicion for shock.  DENNY    Pulm  #Intubated  #Possible oropharyngeal blood  Findings of considerable blood in oropharynx and nasopharynx requiring suctioning. No additional bleeding. Mouth unpacked. Continued bleeding at the nares rocket inserted, Hgb decreased to 8.3 - 3u pRBC given thus far. ENT came to unpack the nares and throat.   - Plan to wean sedation  - Plan to extubate tomorrow - ENT to unpack nose and mouth as tolerated  - Restarted propofol, fentanyl drips for ENT procedure      #Aspiration PNA  Found to be increasingly altered per wife and s/p unwitnessed fall day prior to admission. Received CTX 1g at [facility].  - Continue Zosyn 4.5g daily (7 days through 9/23)  - sputum culture positive for MSSA - start nafcillin    GI  #Decompensated Liver Cirrhosis  #Hepatic encephalopathy - see Neuro  #?SBP  #Alcohol Hepatitis  History of chronic alcohol use presenting with hepatic encephalopathy ammonia 159-->186 today with distended abdomen, soft and non-tender and diffusely jaundice. Bedside POCUS with small pockets of fluid. Negative hepatitis panel, HIV. Hepatology noted that risks outweigh benefits.  - Prednisolone for severe alcohol hepatitis  - MELD score 41 today  - Maddrey score 200 today  - Child Ferguson score 13-15 (unable to assess mental status while sedated)  - Continue thiamine, folic acid  - GI consult for varices - request EGD    #Pancreatitis   CT abdomen with some papito-pancreatic stranding. WBC decreasing to 12.8 today. Pt had no abdominal pain on arrival to ED. Tachypneic to 30s-40s upon abdominal palpation. Lipase 1586  -P[place] score 0 points  - low risk for mortality, unlikely to be severe pancreatitis.  -Re-assess at 48 hours (9/16 afternoon).  -Pt has hepatorenal syndrome - cannot give maintenance fluid although clinical suspicion is low at this time.  -On fentanyl drip    #Elevated T-bili  Tbili >25, direct bili >16on admission. CTA a/p: normal biliary duct/gallbladder but has hepatic steatosis with hepatomegaly, and portal hypertension.  - T bili stable   - Trend tbili  - LDH elevated at 248, Hapto WNL at 40.    Nephro  #WILLIAM vs WILLIAM on CKD with anuria  #Hepatorenal syndrome  BUN 55, Cr 6.78 suspect hepatorenal syndrome in setting of portal hypertension with splanchnic arterial vasodilation resulting in decreased renal perfusion. CK low at 24.  - Markedly low UOP @ 25cc since admission.   - Stable BUN/Cr on CVVHD no urine in bladder on POCUS  - On Albumin 25% IVPB 100mg q6h for 3 days. Last day today 9/17  - Norepinephrine with goal MAP 65  - Nephrology onboard: recommend CVVHD.  - Strict I/Os.  - Avoid nephrotoxic med and renally dose meds.    #Hyponatremia - RESOLVED  Na 118 on admission, received 3L NS in ED. Started 3% NS @ 25cc/hr.  - Repeat BMP q4h with goal Na 122 by 6PM 9/15, goal Na 126 by 6PM 9/16  - If Na 128 or higher can give dose of DDAVP 1mcg (can give additional dose 6 hours after if needed)  - Urine Na <20  - Urine osm 290 s/p 2L NS  - Serum osm 276  - Nephro onbaord: likely hypovolemic hyponatremia.    #NAGMA - RESOLVED  Bicarb 18, increased AG 18. Pt met 2/4 SIRS criteria on admission. Had episode of loose/mucous stool. AG 14 9/18  - Trend bicarb and AG  -  Bicarb 23, AG 14 today.      Heme  #Anemia - stable.  Hb 7.3, .5 likely in setting of alcohol use disorder. History of hemorrhoids. Has ecchymosis on flank however no active bleed and known history of anemia. Partner denies history of esophageal varices. INR 2.5. S/p 1 unit pRBC and 1 unit FFP overnight, Hb 7.6 today.  - Continue Vitamin b12, folic acid 1mg daily.  - f/u serum b12 and folate.  - Type & Screen  - Transfuse for Hb <7 or <8 with active bleed  - LDH high, haptoglobin WNL  - Iron 89, TIBC low <17, ferritin elevated 3,417.   - Coag dysfunction 2/2 liver dysfunction.  - Start Heparin AC    #Thrombocytopenia - worsening.  Platelet 115, likely 2/2 alcohol use disorder. 73 today, likely in the setting of critical illness/alcoholic cirrhosis.   - Trend platelets  - Transfuse <10, <20 with fever or <50 with bleeding    ID  #Sepsis  Met 2/4 SIRS criteria (leukocytosis, tachycardic) with suspected asp PNA vs SBP. Blood and urine cultures negative at 12 hours.  Sputum culture positive for MSSA  - f/u blood, urine cultures at 48 hours  - continue nafcillin  - continue zosyn 4.5 through 9/23    Fluids: n/a  Electrolytes: Caution in setting of suspected WILLIAM  Nutrition: NPO  DVT ppx: Heparin 5000 q8h (prothrombotic state)  GI ppx: Protonix  Code: DNR/Intubate  Dispo: MICU

## 2023-09-18 NOTE — PROCEDURE NOTE - NSUSPOCSTATEMENT_ED_ALL
The patient/family was/were informed of limited nature of the exam. Representative images were printed to be scanned into the chart or directly uploaded into the medical record.

## 2023-09-18 NOTE — PROCEDURE NOTE - NSUS ED INDICATIONS1
hypotension
Hypoxia/hypotension
hypotension
hypotension
Undiferentiated Shock
hypotension
Post-Intubation
s/p fall/Blunt Trauma
hypotension
Dyspnea

## 2023-09-18 NOTE — CONSULT NOTE ADULT - PROBLEM SELECTOR RECOMMENDATION 7
.  Complex medical decision making / symptom management in the setting of critical illness.    Will continue to follow for ongoing GOC discussion / titration of palliative regimen. Emotional support provided, questions answered.  Active Psychosocial Referrals:  [x]Social Work/Case management []PT/OT []Chaplaincy []Hospice  []Patient/Family Support []Holistic RN []Massage Therapy []Music Therapy []Ethics  Coping: [] well [] with difficulty [] poor coping [x] unable to assess  Support system: [] strong [x] adequate [] inadequate    For new or uncontrolled symptoms, please call Palliative Care at [phone]). The service is available 24/7 (including nights & weekends) to provide symptom management recommendations over the phone as appropriate

## 2023-09-18 NOTE — PROCEDURE NOTE - NSUS ED ADDITIONAL DETAIL1 FT
No free fluid  Minimal ascites  Dilated loops of bowel, +peristalsis visualized
Negative 5 pt DVT study b/l
Normal RV and LV size and function
Small amount of RLQ ascities
Normal ejection fraction, grossly normal valves, RV size within normal limits
No DVT present in bilateral legs
A line pattern anteriorly with Small LLL consolidation
Acute cystitis with hematuria
b/l A lines anteriorly  b/l b lines posteriorly  b/l atelectasis  ascites

## 2023-09-18 NOTE — CONSULT NOTE ADULT - PROBLEM SELECTOR RECOMMENDATION 5
.  In the setting of Alcohol Use Disorder, unclear baseline  -poor candidate for transplant given acute illness  -MELD = 40; high likelihood of mortality in short-term  -hospice eligible and appropriate

## 2023-09-18 NOTE — CONSULT NOTE ADULT - NS ATTEST RISK PROBLEM GEN_ALL_CORE FT
Acute Illness That Poses A Threat To Life  Abrupt Change In Neurological Status  Chronic Illness With Severe Exacerbation/Progression  Prescription Of Parenteral Controlled Substances

## 2023-09-18 NOTE — CONSULT NOTE ADULT - ASSESSMENT
54yo M with PMH of Alcohol Use Disorder, Cirrhosis, Anemia, and GERD p/w encephalopathy and found to have decompensated liver disease. Palliative consulted for complex medical decision making in the setting of critical illness. 52yo M with PMH of Alcohol Use Disorder, Cirrhosis, Anemia, and GERD p/w encephalopathy and found to have decompensated liver disease. Palliative consulted for complex medical decision making in the setting of critical illness.    ·	pending family meeting tomorrow at 2pm to discuss overall prognosis, expected disease trajectory, and next steps

## 2023-09-18 NOTE — CONSULT NOTE ADULT - PROBLEM SELECTOR RECOMMENDATION 6
.  Patient is DNR, MOLST in chart  -significant other is surrogate decision maker but family/friends are assisting  -pending family meeting tomorrow at 2pm    In addition to the E/M visit, an advance care planning meeting was performed. Start time: 4:00PM; End time: 4:46PM; Total time: min. A face to face meeting to discuss advance care planning was held today regarding: DEON VARGAS. Primary decision maker: Patient is unable to participate in decision making; Alternate/surrogate: Significant Other. Discussed advance directives including, but not limited to, healthcare proxy and code status. Decision regarding code status: DNR; Documentation completed today: [facility] note

## 2023-09-18 NOTE — PROGRESS NOTE ADULT - ASSESSMENT
Evaluated at bedside while on CVVHD, tolerating CVVHD with the following prescription:    CRRT Treatment:   Modality: CVVHD, Filter: NxStage CAR-505, Target Blood Flow: 200 mL/Min  Target Fluid Balance: Titrate to net EVEN fluid balance (09-18-23 @ 08:40) [Active]  Phoxillum Filtration BK 4 / 2.5: Solution, 5000 milliLiter(s) infuse at 1300 mL/Hr; infuse through CRRT Circuit  Administration Instructions: Continuous Renal Replacement Therapy (CRRT)  Special Instructions: Dialysate (09-18-23 @ 08:40) [Active]

## 2023-09-18 NOTE — PROGRESS NOTE ADULT - ASSESSMENT
53Y M now with anuric WILLIAM likley 2/2 HRS now requiring CVVHD for clearance , with  signs of recovery, continue RRT for clearance with no fluid removal.    Last 24h volume balance: IN: 3004.4 mL / OUT: 8615 mL / NET: -5610.6 mL  Last 24h UOP: 0    Tolerating CVVHD with the following prescription:  CRRT Treatment:   Modality: CVVHD, Filter: NxStage CAR-505, Target Blood Flow: 200 mL/Min  Target Fluid Balance: No fluid removal.  Phoxillum Filtration BK 4 / 2.5: Solution, 5000 milliLiter(s) infuse at 1300 mL/Hr; infuse through CRRT Circuit  Administration Instructions: Continuous Renal Replacement Therapy (CRRT)  Special Instructions: Dialysate (09-18-23 @ 08:40) [Active]    Anuric ATN iso HRS/shock, on CVVHD:   Cont. CVVHD, will change prescription to No fluid removal for now and reassess volume status. Pte is currently -5.6L of net during the last 24h.   Remains anuric, requiring vasopressors.   Avoid hypotension, maintain SBP>120 as possible.   Continue with Levo, Albumin PRN.   Daily weights.   Daily labs while on cvvhd, P 2.2, check P levels daily.   Strict I&Os. Keep Duron.   Currently on renally dosed Zosyn @ 4.5g q8h, extended infusion. Nafcillin is poorly dialyzed, no dose adjustment required.

## 2023-09-18 NOTE — PROGRESS NOTE ADULT - SUBJECTIVE AND OBJECTIVE BOX
*** DRAFT NOTE ***    HEPATOLOGY PROGRESS NOTE    INTERVAL/SUBJECTIVE:  - Stably low pressor requirement  - Remains intubated    Allergies  No Known Allergies    Intolerances    MEDICATIONS:  MEDICATIONS  (STANDING):  chlorhexidine 0.12% Liquid 15 milliLiter(s) Oral Mucosa every 12 hours  chlorhexidine 2% Cloths 1 Application(s) Topical <User Schedule>  CRRT Treatment    <Continuous>  cyanocobalamin Injectable 1000 MICROGram(s) IntraMuscular every 24 hours  folic acid 1 milliGRAM(s) Oral daily  heparin   Injectable 5000 Unit(s) SubCutaneous every 8 hours  lactulose Syrup 30 Gram(s) Oral every 4 hours  levETIRAcetam  IVPB 500 milliGRAM(s) IV Intermittent every 12 hours  multivitamin 1 Tablet(s) Oral daily  mupirocin 2% Nasal 1 Application(s) Both Nostrils two times a day  nafcillin  IVPB      nafcillin  IVPB 2 Gram(s) IV Intermittent every 4 hours  norepinephrine Infusion 0.05 MICROgram(s)/kG/Min (8.25 mL/Hr) IV Continuous <Continuous>  octreotide  Infusion 50 MICROgram(s)/Hr (10 mL/Hr) IV Continuous <Continuous>  oxymetazoline 0.05% Nasal Spray 1 Spray(s) Both Nostrils two times a day  pantoprazole  Injectable 40 milliGRAM(s) IV Push every 12 hours  Phoxillum Filtration BK 4 / 2.5 5000 milliLiter(s) (1300 mL/Hr) CRRT <Continuous>  piperacillin/tazobactam IVPB.. 4.5 Gram(s) IV Intermittent every 8 hours  prednisoLONE    3 mG/mL Solution (ORAPRED) 40 milliGRAM(s) Oral daily  propofol Infusion 10 MICROgram(s)/kG/Min (5.28 mL/Hr) IV Continuous <Continuous>  rifAXIMin 550 milliGRAM(s) Oral two times a day    MEDICATIONS  (PRN):  sodium chloride 0.9% lock flush 10 milliLiter(s) IV Push every 1 hour PRN Pre/post blood products, medications, blood draw, and to maintain line patency    Vital Signs Last 24 Hrs  T(C): 36.3 (18 Sep 2023 14:01), Max: 37.2 (17 Sep 2023 19:01)  T(F): 97.3 (18 Sep 2023 14:01), Max: 99 (17 Sep 2023 19:01)  HR: 99 (18 Sep 2023 17:19) (76 - 111)  BP: 100/68 (18 Sep 2023 17:00) (82/45 - 124/67)  BP(mean): 80 (18 Sep 2023 17:00) (57 - 89)  RR: 18 (18 Sep 2023 17:00) (8 - 22)  SpO2: 94% (18 Sep 2023 17:19) (94% - 99%)    Parameters below as of 18 Sep 2023 17:00  Patient On (Oxygen Delivery Method): ventilator    O2 Concentration (%): 40    09-17 @ 07:01  -  09-18 @ 07:00  --------------------------------------------------------  IN: 3004.4 mL / OUT: 8615 mL / NET: -5610.6 mL    09-18 @ 07:01  -  09-18 @ 18:07  --------------------------------------------------------  IN: 994 mL / OUT: 1006 mL / NET: -12 mL    PHYSICAL EXAM:  General: intubated, sedated  Lungs: bilateral chest rise  Abdomen: nondistended, soft, nontender, No rebound or guarding  Extremities: no edema  Neurological: pupils symmetric and reactive, does not withdraw to nailbed pressure    LABS:                        8.3    12.13 )-----------( 73       ( 18 Sep 2023 05:00 )             23.7     09-18    139  |  102  |  27<H>  ----------------------------<  113<H>  3.7   |  23  |  3.09<H>    Ca    9.0      18 Sep 2023 05:00  Phos  2.2     09-18  Mg     1.9     09-18    TPro  5.7<L>  /  Alb  4.0  /  TBili  27.4<H>  /  DBili  x   /  AST  105<H>  /  ALT  42  /  AlkPhos  97  09-18    PT/INR - ( 18 Sep 2023 05:00 )   PT: 28.3 sec;   INR: 2.55          PTT - ( 18 Sep 2023 05:00 )  PTT:67.7 sec      RADIOLOGY & ADDITIONAL STUDIES:  Reviewed HEPATOLOGY PROGRESS NOTE    INTERVAL/SUBJECTIVE:  - Stably low pressor requirement  - Remains intubated    Allergies  No Known Allergies    Intolerances    MEDICATIONS:  MEDICATIONS  (STANDING):  chlorhexidine 0.12% Liquid 15 milliLiter(s) Oral Mucosa every 12 hours  chlorhexidine 2% Cloths 1 Application(s) Topical <User Schedule>  CRRT Treatment    <Continuous>  cyanocobalamin Injectable 1000 MICROGram(s) IntraMuscular every 24 hours  folic acid 1 milliGRAM(s) Oral daily  heparin   Injectable 5000 Unit(s) SubCutaneous every 8 hours  lactulose Syrup 30 Gram(s) Oral every 4 hours  levETIRAcetam  IVPB 500 milliGRAM(s) IV Intermittent every 12 hours  multivitamin 1 Tablet(s) Oral daily  mupirocin 2% Nasal 1 Application(s) Both Nostrils two times a day  nafcillin  IVPB      nafcillin  IVPB 2 Gram(s) IV Intermittent every 4 hours  norepinephrine Infusion 0.05 MICROgram(s)/kG/Min (8.25 mL/Hr) IV Continuous <Continuous>  octreotide  Infusion 50 MICROgram(s)/Hr (10 mL/Hr) IV Continuous <Continuous>  oxymetazoline 0.05% Nasal Spray 1 Spray(s) Both Nostrils two times a day  pantoprazole  Injectable 40 milliGRAM(s) IV Push every 12 hours  Phoxillum Filtration BK 4 / 2.5 5000 milliLiter(s) (1300 mL/Hr) CRRT <Continuous>  piperacillin/tazobactam IVPB.. 4.5 Gram(s) IV Intermittent every 8 hours  prednisoLONE    3 mG/mL Solution (ORAPRED) 40 milliGRAM(s) Oral daily  propofol Infusion 10 MICROgram(s)/kG/Min (5.28 mL/Hr) IV Continuous <Continuous>  rifAXIMin 550 milliGRAM(s) Oral two times a day    MEDICATIONS  (PRN):  sodium chloride 0.9% lock flush 10 milliLiter(s) IV Push every 1 hour PRN Pre/post blood products, medications, blood draw, and to maintain line patency    Vital Signs Last 24 Hrs  T(C): 36.3 (18 Sep 2023 14:01), Max: 37.2 (17 Sep 2023 19:01)  T(F): 97.3 (18 Sep 2023 14:01), Max: 99 (17 Sep 2023 19:01)  HR: 99 (18 Sep 2023 17:19) (76 - 111)  BP: 100/68 (18 Sep 2023 17:00) (82/45 - 124/67)  BP(mean): 80 (18 Sep 2023 17:00) (57 - 89)  RR: 18 (18 Sep 2023 17:00) (8 - 22)  SpO2: 94% (18 Sep 2023 17:19) (94% - 99%)    Parameters below as of 18 Sep 2023 17:00  Patient On (Oxygen Delivery Method): ventilator    O2 Concentration (%): 40    09-17 @ 07:01  -  09-18 @ 07:00  --------------------------------------------------------  IN: 3004.4 mL / OUT: 8615 mL / NET: -5610.6 mL    09-18 @ 07:01  -  09-18 @ 18:07  --------------------------------------------------------  IN: 994 mL / OUT: 1006 mL / NET: -12 mL    PHYSICAL EXAM:  General: intubated, sedated  Lungs: bilateral chest rise  Abdomen: nondistended  Extremities: no edema    LABS:                        8.3    12.13 )-----------( 73       ( 18 Sep 2023 05:00 )             23.7     09-18    139  |  102  |  27<H>  ----------------------------<  113<H>  3.7   |  23  |  3.09<H>    Ca    9.0      18 Sep 2023 05:00  Phos  2.2     09-18  Mg     1.9     09-18    TPro  5.7<L>  /  Alb  4.0  /  TBili  27.4<H>  /  DBili  x   /  AST  105<H>  /  ALT  42  /  AlkPhos  97  09-18    PT/INR - ( 18 Sep 2023 05:00 )   PT: 28.3 sec;   INR: 2.55          PTT - ( 18 Sep 2023 05:00 )  PTT:67.7 sec      RADIOLOGY & ADDITIONAL STUDIES:  Reviewed

## 2023-09-18 NOTE — PROCEDURE NOTE - NSUSCARDIACWINDOWS_ED_ALL
Parasternal Long/Parasternal Short
Parasternal Long/Parasternal Short/Apical 4-Chamber
Parasternal Long/Parasternal Short/Apical 4-Chamber/Sub-Xyphoid View
Parasternal Long/Parasternal Short/Apical 4-Chamber

## 2023-09-18 NOTE — PROCEDURE NOTE - NSUS ED PROC PERFORMED BY1 FT
Soren Swartz
Soren Swartz
me
myself
Myself
Soren Swartz
Soren Swartz
myself
me
myself

## 2023-09-18 NOTE — CONSULT NOTE ADULT - CONVERSATION DETAILS
Reviewed patient's hospital course and interval developments. Discussed advanced directives including code status, HCP completion, and hospice eligibility. Significant other remains hopeful for some kind of meaningful recovery. Friend and sister expressed concerns about patient's long-term prognosis and anticipated quality of life. MOLST already completed with DNR. Explained the two paths forward are continuing the current level of care on life support to the patient whatever possible opportunity to recover vs de-escalation of care to allow a natural disease process to unfold. Will [place] additional family [place] from [place] for further discussion.

## 2023-09-18 NOTE — CONSULT NOTE ADULT - SUBJECTIVE AND OBJECTIVE BOX
[facility] Geriatrics and Palliative Care  Domingo Bennett, Palliative Care Attending  Contact Info: Call [phone] (HEAL Line) or message on [employer] Teams (Domingo Bennett)    HPI:  CC: [id number]  HPI: [id number] Hx of EtOH abuse and cirrhosis, anemia, and GERD requiring transfusion in the past, brought in by EMS for altered mental status.  Patient's partner states that he had a mechanical fall last night, afterwards told her not to call EMS to take him to the hospital.  Today, patient has been less responsive than usual, so she called EMS to bring him in.  Partner states that patient developed extensive bruising to left abdomen and flank 2 to 3 days ago, prior to last night's fall. Pt normally drinks 1 bottle of vodka daily but has been cutting back to a few shots. Unable to obtain ROS as patient somnolent.    Patient seen and examined at bedside. Appears overtly comfortable. Intubated/sedated, unable to participate in interview. Comprehensive symptom assessment and GOC exploration as noted below. Further collateral obtained from primary team, chart, and family.    PERTINENT PM/SXH:   Cirrhosis  History of alcohol use disorder  GERD (gastroesophageal reflux disease)    FAMILY HISTORY:  No history of cirrhosis in first degree relatives  Unable to obtain due to encephalopathy    ITEMS NOT CHECKED ARE NOT PRESENT  SOCIAL HISTORY:   Significant other/partner:  [x]  Children:  []  Substance hx:  []   Tobacco hx:  []   Alcohol hx: [x]   Home Opioid hx:  [] I-Stop Reference No: [id number]  - no active Rx's  Living Situation: [x]Home  []Long term care  []Rehab []Other  Faith/Spiritual practice: ; Role of organized Orthodoxy [] important [] some [x] unable to assess  Coping: [] well [] with difficulty [] poor coping [x] unable to assess  Support system: [] strong [x] adequate [] inadequate    ADVANCE DIRECTIVES:    [x]MOLST  []Living Will  DECISION MAKER(s):  [] Health Care Proxy(s)  [x] Surrogate(s)  [] Guardian           Name(s)/Phone Number(s): Chau (Significant Other), [phone]    BASELINE (I)ADLs (prior to admission):  [place]: []Total  [x] Moderate []Dependent    ALLERGIES:  No Known Allergies    MEDICATIONS  (STANDING):  chlorhexidine 0.12% Liquid 15 milliLiter(s) Oral Mucosa every 12 hours  chlorhexidine 2% Cloths 1 Application(s) Topical <User Schedule>  CRRT Treatment    <Continuous>  cyanocobalamin Injectable 1000 MICROGram(s) IntraMuscular every 24 hours  folic acid 1 milliGRAM(s) Oral daily  heparin   Injectable 5000 Unit(s) SubCutaneous every 8 hours  lactulose Syrup 30 Gram(s) Oral every 4 hours  levETIRAcetam  IVPB 500 milliGRAM(s) IV Intermittent every 12 hours  multivitamin 1 Tablet(s) Oral daily  mupirocin 2% Nasal 1 Application(s) Both Nostrils two times a day  nafcillin  IVPB      nafcillin  IVPB 2 Gram(s) IV Intermittent every 4 hours  norepinephrine Infusion 0.05 MICROgram(s)/kG/Min (8.25 mL/Hr) IV Continuous <Continuous>  octreotide  Infusion 50 MICROgram(s)/Hr (10 mL/Hr) IV Continuous <Continuous>  oxymetazoline 0.05% Nasal Spray 1 Spray(s) Both Nostrils two times a day  pantoprazole  Injectable 40 milliGRAM(s) IV Push every 12 hours  Phoxillum Filtration BK 4 / 2.5 5000 milliLiter(s) (1300 mL/Hr) CRRT <Continuous>  piperacillin/tazobactam IVPB.. 4.5 Gram(s) IV Intermittent every 8 hours  potassium phosphate IVPB 15 milliMole(s) IV Intermittent once  prednisoLONE    3 mG/mL Solution (ORAPRED) 40 milliGRAM(s) Oral daily  propofol Infusion 10 MICROgram(s)/kG/Min (5.28 mL/Hr) IV Continuous <Continuous>  rifAXIMin 550 milliGRAM(s) Oral two times a day    MEDICATIONS  (PRN):  sodium chloride 0.9% lock flush 10 milliLiter(s) IV Push every 1 hour PRN Pre/post blood products, medications, blood draw, and to maintain line patency    Analgesic Use (Scheduled and PRNs) for past 24 hours:  levETIRAcetam  IVPB   400 mL/Hr IV Intermittent (09-18-23 @ 05:10)   400 mL/Hr IV Intermittent (09-17-23 @ 17:29)  propofol Infusion   5.28 mL/Hr IV Continuous (09-18-23 @ 00:17)   5.28 mL/Hr IV Continuous (09-17-23 @ 12:43)    PRESENT SYMPTOMS: []Unable to obtain due to poor mentation/encephalopathy  Source if other than patient:  []Family   []Team     Pain: [] yes [x] no  QOL impact -  Location -  Aggravating factors -  Quality -  Radiation -  Timing -  Severity (0-10 scale) -  Minimal acceptable level (0-10 scale) -    PAINAD Score: 0  CPOT Score: 0    Dyspnea:                           []Mild  []Moderate []Severe  Anxiety:                             []Mild []Moderate []Severe  Fatigue:                             []Mild []Moderate []Severe  Nausea:                             []Mild []Moderate []Severe  Loss of appetite:              []Mild []Moderate []Severe  Constipation:                    []Mild []Moderate []Severe    Other Symptoms:  [x]All other review of systems negative     Palliative Performance Status Version 2: 10%    GENERAL:  [] NAD []Alert [x]Lethargic  []Cachexia  [x]Unarousable  []Verbal  [x]Non-Verbal  BEHAVIORAL:   []Anxiety  [x]Delirium []Agitation []Cooperative []Oriented x  HEENT:  []Normal  [] Moist Mucous Membranes []Dry mouth   [x]ET Tube/Trach  []Oral lesions  PULMONARY:   []Clear []Tachypnea  []Audible excessive secretions  [x]Normal Work of Breathing []Labored Breathing  [x]Rhonchi []Crackles []Wheezing  CARDIOVASCULAR:    [x]Regular Rate [x]Regular Rhythm []Irregular []Tachy  []Be  GASTROINTESTINAL:  [x]Soft  [x]Distended   [x]+BS  [x]Non tender []Tender  []PEG [x]OGT/ NGT  Last BM:  GENITOURINARY:  []Normal [] Incontinent   []Oliguria/Anuria   [x]Duron  MUSCULOSKELETAL:   []Normal Extremities  [x]Weakness  [x]Bed/Wheelchair bound []Edema  NEUROLOGIC:   []No focal deficits  []Cognitive impairment  [x]Dysphagia []Dysarthria []Paresis [x]Encephalopathic  SKIN:   [x]Normal   []Pressure ulcer(s)  []Rash    CRITICAL CARE:  [x]Shock Present  [ ]Septic [ ]Cardiogenic [ ]Neurologic [ ]Hypovolemic  [x] Vasopressors [ ]Inotropes   [x]Respiratory failure present [x]Mechanical Ventilation [ ]Non-invasive ventilatory support [ ]High-Flow  [x]Acute  [ ]Chronic [x]Hypoxic  [ ]Hypercarbic   [ ]Other organ failure    Vital Signs Last 24 Hrs  T(C): 36 (18 Sep 2023 10:01), Max: 37.3 (17 Sep 2023 14:09)  T(F): 96.8 (18 Sep 2023 10:01), Max: 99.1 (17 Sep 2023 14:09)  HR: 103 (18 Sep 2023 10:01) (76 - 111)  BP: 109/59 (18 Sep 2023 10:01) (82/45 - 124/67)  BP(mean): 80 (18 Sep 2023 10:01) (57 - 88)  RR: 19 (18 Sep 2023 10:01) (12 - 22)  SpO2: 95% (18 Sep 2023 10:01) (93% - 99%)    Parameters below as of 18 Sep 2023 10:01  Patient On (Oxygen Delivery Method): ventilator  O2 Concentration (%): 40     LABS: Personally reviewed and interpreted                      8.3    12.13 )-----------( 73       ( 18 Sep 2023 05:00 )             23.7   09-18    139  |  102  |  27<H>  ----------------------------<  113<H>  3.7   |  23  |  3.09<H>    Ca    9.0      18 Sep 2023 05:00  Phos  2.2     09-18  Mg     1.9     09-18  TPro  5.7<L>  /  Alb  4.0  /  TBili  27.4<H>  /  DBili  x   /  AST  105<H>  /  ALT  42  /  AlkPhos  97  09-18    RADIOLOGY & ADDITIONAL STUDIES: Personally reviewed and interpreted  < from: CT Abdomen and Pelvis No Cont (09.15.23 @ 03:16) >  1.  No retroperitonealhematoma.  2.  Increased subcutaneous unorganized fluid and stranding in left body wall, most compatible with contusion given history of recent falls.  3.  Increased small volume ascites, could be related to history of peritoneal lavage or ascites related to portal hypertension. No evidence of hemoperitoneum.  4.  Increased mild peripancreatic stranding, recommend clinical and laboratory correlation for pancreatitis.  5.  Increased trace bilateral pleural effusions and adjacent lower lobe atelectasis.  6.  Unchanged enlarged fatty liver.  7.  Unchanged mildly enlarged spleen.    REFERRALS:  [x]Social Work/Case management []PT/OT []Chaplaincy  []Hospice  []Patient/Family Support []Massage Therapy []Music Therapy []Holistic RN []Ethics    DISCUSSION OF CASE: Significant Other, Friend - to provide updates and emotional support; Primary Team/RN - to discuss plan of care

## 2023-09-18 NOTE — PROCEDURE NOTE - NSUSCPTCODES_ED_ALL
19858 Duplex Scan of Extremity Veins (Unilateral)
12860 Echocardiography Transthoracic with Image 2D (Echo/FAST)
42109 US Chest (PTX, Pleural Effussion/CHF vs COPD)
29857 Echocardiography Transthoracic with Image 2D (Echo/FAST)
98051 Duplex Scan of Extremity Veins (Unilateral)
35473 Duplex Scan of Extremity Veins (Unilateral)
09013 Abdominal Ultrasound (GB/FAST)
99244 US Chest (PTX, Pleural Effussion/CHF vs COPD)
76067 Abdominal Ultrasound (GB/FAST)
62262 Abdominal Ultrasound (GB/FAST)
52090 Echocardiography Transthoracic with Image 2D (Echo/FAST)
77414 Duplex Scan of Extremity Veins (Unilateral)
93744 US Chest (PTX, Pleural Effussion/CHF vs COPD)
81338 Echocardiography Transthoracic with Image 2D (Echo/FAST)

## 2023-09-18 NOTE — CONSULT NOTE ADULT - PROBLEM SELECTOR RECOMMENDATION 3
.  In the setting of metabolic encephalopathy  -ongoing efforts to wean sedation for SBTs  -patient would be a poor candidate for trach/PEG

## 2023-09-18 NOTE — PROGRESS NOTE ADULT - CRITICAL CARE ATTENDING COMMENT
S/p fall (unclear if mechanical) days ago, acute decompensated cirrhotic encephalopathy, c/b HRS, and severe sepsis, requiring mechanical ventilation. Broad spectrum abx; MSSA in sputum. HRS treatment completed with no recovery of renal function as of yet. C/w CVVHD; keep slightly positive. C/w Lactulose. Serologic workup for cirrhosis in progress. S/p ET tube exchange on 9/15. Appreciate ENT recs for profuse nasal bleeding.  Need GI to scope to assess for varices. Critically ill with poor prognosis. Monitoring off sedation to determine current mental status. Rest of plan as per above.
S/p fall (unclear if mechanical) days ago, acute decompensated cirrhotic encephalopathy, c/b HRS, and severe sepsis, requiring mechanical ventilation. Broad spectrum abx; cultures negative to date. Levophed and albumin for HRS to be completed today. C/w CVVHD; keep slightly positive. C/w Lactulose. Serologic workup for cirrhosis in progress. S/p ET tube exchange on 9/15. Appreciate ENT recs for profuse nasal bleeding. Monitor lipase. Need GI to scope to assess for varices. Critically ill with poor prognosis. GOC meeting with family today. Rest of plan as per above.
S/p fall (unclear if mechanical) days ago, acute decompensated cirrhotic encephalopathy, c/b HRS, and severe sepsis, requiring mechanical ventilation. Broad spectrum abx; cultures negative to date. Levophed and albumin for HRS. C/w CVVHD. Lactulose until goal 4 to 6 BMs per 24 hours is reached. Serologic workup for cirrhosis in progress. Persistent ET tube leak; s/p ET tube exchange on 9/15. Appreciate ENT recs for profuse nasal bleeding. Lipase uptrending; will stop fluid removel on CVVH for now. Critically ill with poor prognosis. Rest of plan as per above.
S/p fall (unclear if mechanical) days ago, acute decompensated cirrhotic encephalopathy, c/b HRS, and severe sepsis, requiring mechanical ventilation. Broad spectrum abx; culture data pending. Levophed and albumin for HRS. Will initiate CVVHD today due to anuria. Lactulose until goal 4 to 6 BMs per 24 hours is reached. Serologic workup for cirrhosis in progress. Persistent ET tube leak noted today; tube exhange done at bedside by MICU team without complications. Critically ill with poor prognosis. Rest of plan as per above.

## 2023-09-18 NOTE — PROCEDURE NOTE - NSUS ED PROCEDURE ASSISTED BY
Assistance was available
Supervision was available
Assistance was available
Supervision was available
Supervision was available
Assistance was available
Supervision was available
Assistance was available
Assistance was available

## 2023-09-19 NOTE — PROGRESS NOTE ADULT - SUBJECTIVE AND OBJECTIVE BOX
HEPATOLOGY PROGRESS NOTE    INTERVAL/SUBJECTIVE:  - Developed BRBPR mixed with stool in rectal tube  - Ongoing oozing from oropharynx    Allergies  No Known Allergies    Intolerances    MEDICATIONS:  MEDICATIONS  (STANDING):  chlorhexidine 0.12% Liquid 15 milliLiter(s) Oral Mucosa every 12 hours  chlorhexidine 2% Cloths 1 Application(s) Topical <User Schedule>  CRRT Treatment    <Continuous>  cyanocobalamin Injectable 1000 MICROGram(s) IntraMuscular every 24 hours  dexMEDEtomidine Infusion 0.2 MICROgram(s)/kG/Hr (4.4 mL/Hr) IV Continuous <Continuous>  fentaNYL    Injectable 100 MICROGram(s) IV Push once  folic acid 1 milliGRAM(s) Oral daily  heparin   Injectable 5000 Unit(s) SubCutaneous every 8 hours  lactulose Syrup 30 Gram(s) Oral every 4 hours  levETIRAcetam  IVPB 500 milliGRAM(s) IV Intermittent every 12 hours  midazolam Injectable 2 milliGRAM(s) IV Push once  midazolam Injectable 2 milliGRAM(s) IV Push once  multivitamin 1 Tablet(s) Oral daily  mupirocin 2% Nasal 1 Application(s) Both Nostrils two times a day  nafcillin  IVPB      nafcillin  IVPB 2 Gram(s) IV Intermittent every 4 hours  norepinephrine Infusion 0.05 MICROgram(s)/kG/Min (8.25 mL/Hr) IV Continuous <Continuous>  octreotide  Infusion 50 MICROgram(s)/Hr (10 mL/Hr) IV Continuous <Continuous>  oxymetazoline 0.05% Nasal Spray 1 Spray(s) Both Nostrils two times a day  pantoprazole  Injectable 40 milliGRAM(s) IV Push every 12 hours  Phoxillum Filtration BK 4 / 2.5 5000 milliLiter(s) (1300 mL/Hr) CRRT <Continuous>  piperacillin/tazobactam IVPB.. 4.5 Gram(s) IV Intermittent every 8 hours  propofol Infusion 10 MICROgram(s)/kG/Min (5.28 mL/Hr) IV Continuous <Continuous>  rifAXIMin 550 milliGRAM(s) Oral two times a day  Tranexamic acid 2000 milliGRAM(s) 2000 milliGRAM(s) Nasal once    MEDICATIONS  (PRN):  sodium chloride 0.9% lock flush 10 milliLiter(s) IV Push every 1 hour PRN Pre/post blood products, medications, blood draw, and to maintain line patency    Vital Signs Last 24 Hrs  T(C): 36.6 (19 Sep 2023 13:49), Max: 37.2 (19 Sep 2023 06:04)  T(F): 97.9 (19 Sep 2023 13:49), Max: 99 (19 Sep 2023 06:04)  HR: 88 (19 Sep 2023 16:12) (68 - 117)  BP: 94/52 (19 Sep 2023 15:00) (79/46 - 119/66)  BP(mean): 70 (19 Sep 2023 15:00) (58 - 84)  RR: 19 (19 Sep 2023 15:00) (13 - 34)  SpO2: 93% (19 Sep 2023 16:12) (90% - 99%)    Parameters below as of 19 Sep 2023 16:12  Patient On (Oxygen Delivery Method): ventilator    O2 Concentration (%): 40    09-18 @ 07:01  -  09-19 @ 07:00  --------------------------------------------------------  IN: 2792.4 mL / OUT: 4146 mL / NET: -1353.6 mL    09-19 @ 07:01  -  09-19 @ 17:58  --------------------------------------------------------  IN: 722.8 mL / OUT: 0 mL / NET: 722.8 mL    PHYSICAL EXAM:  General: intubated, sedated  HEENT: dried and incompletely dried blood in oropharyxn  Lungs: bilateral chest rise  Abdomen: nondistended  Extremities: no edema  Neurological: moving extremities spontaneously    LABS:             7.6    15.90 )-----------( 107      ( 19 Sep 2023 04:29 )             21.6     09-19    142  |  104  |  27<H>  ----------------------------<  113<H>  4.0   |  23  |  2.60<H>    Ca    8.2<L>      19 Sep 2023 04:29  Phos  3.5     09-19  Mg     2.2     09-19    TPro  5.4<L>  /  Alb  3.7  /  TBili  28.0<H>  /  DBili  x   /  AST  110<H>  /  ALT  47<H>  /  AlkPhos  96  09-19    PT/INR - ( 19 Sep 2023 04:29 )   PT: 28.5 sec;   INR: 2.57     PTT - ( 19 Sep 2023 04:29 )  PTT:74.2 sec    RADIOLOGY & ADDITIONAL STUDIES:  Reviewed

## 2023-09-19 NOTE — PROGRESS NOTE ADULT - SUBJECTIVE AND OBJECTIVE BOX
Hospital course:   53M PMH of EtOH abuse and cirrhosis, anemia requiring transfusion in the past, and GERD, brought in by EMS for altered mental status. Patient's partner states that he had a mechanical fall nightprior, afterwards told her not to call EMS to take him to the hospital. On day of admission, patient was less responsive than usual, so she called EMS to bring him in.  Partner states that patient developed extensive bruising to left abdomen and flank 2 to 3 days ago, prior to the fall. Pt normally drinks 1 bottle of vodka daily but has been cutting back to a few shots. Admitted to MICU for intubation i/s/o AMS and airway protection. Found to have hepatorenal syndrome and likely hepatic encephalopathy 2/2 alcohol hepatitis and decompensated liver cirrhosis, a right frontal subdural hematoma, WILILAM with oliguria, and SIRS 2/2 aspiration PA vs SBP. On Levophed drip. Pt had large amounts of blood in mouth during intubation. Cuff was exchanged in the MICU due to leak, and Bronchoscopy was also performed which identified no acute bleed. There continued to be significant bleeding from the nares and mouth with a large clot forming outside the nares. ENT was consulted to come and pack the mouth and nose, although they could not identify a source of the bleed. 9/19 the patient has had 20cc of saleem blood drained from the ET tube and melenic stool out of the rectal tube.     INTERVAL HPI/OVERNIGHT EVENTS:   Overnight, about 20cc of dark blood removed from ET tube. Rectal tube output 1.1L is bright red. WBC increased to 15.     Subjective: Patient seen and examined at bedside. 1.1L stool overnight --> 8L total. Goals of care discussion - Patient DNR/Intubate.    PHYSICAL EXAM:  GENERAL: ill appearing, intubated, sedation weaned  HEAD:  Atraumatic   EYES: icteric sclera  ENT: nasal packing   CHEST/LUNG: on vent stable FiO2 and PEEP, lungs clear to auscultation b/l  ABDOMEN: large and distended but soft and non-tender.  NERVOUS SYSTEM:  patient sedation weaned  SKIN: diffuse jaundice throughout body    ICU Vital Signs Last 24 Hrs  T(C): 36.3 (19 Sep 2023 09:02), Max: 37.2 (19 Sep 2023 06:04)  T(F): 97.3 (19 Sep 2023 09:02), Max: 99 (19 Sep 2023 06:04)  HR: 93 (19 Sep 2023 12:00) (68 - 117)  BP: 96/55 (19 Sep 2023 12:00) (79/46 - 119/66)  BP(mean): 69 (19 Sep 2023 12:00) (58 - 89)  RR: 18 (19 Sep 2023 12:00) (8 - 34)  SpO2: 92% (19 Sep 2023 12:00) (90% - 99%)    O2 Parameters below as of 19 Sep 2023 12:00  Patient On (Oxygen Delivery Method): ventilator    O2 Concentration (%): 40      I&O's Summary    18 Sep 2023 07:01  -  19 Sep 2023 07:00  --------------------------------------------------------  IN: 2792.4 mL / OUT: 4146 mL / NET: -1353.6 mL    19 Sep 2023 07:01  -  19 Sep 2023 12:42  --------------------------------------------------------  IN: 532 mL / OUT: 0 mL / NET: 532 mL      Mode: CPAP with PS  FiO2: 40  PEEP: 5  PS: 5  MAP: 7  PIP: 12    LABS:                        7.6    15.90 )-----------( 107      ( 19 Sep 2023 04:29 )             21.6     09-19    142  |  104  |  27<H>  ----------------------------<  113<H>  4.0   |  23  |  2.60<H>    Ca    8.2<L>      19 Sep 2023 04:29  Phos  3.5     09-19  Mg     2.2     09-19    TPro  5.4<L>  /  Alb  3.7  /  TBili  28.0<H>  /  DBili  x   /  AST  110<H>  /  ALT  47<H>  /  AlkPhos  96  09-19    PT/INR - ( 19 Sep 2023 04:29 )   PT: 28.5 sec;   INR: 2.57          PTT - ( 19 Sep 2023 04:29 )  PTT:74.2 sec  Urinalysis Basic - ( 19 Sep 2023 04:29 )    Color: x / Appearance: x / SG: x / pH: x  Gluc: 113 mg/dL / Ketone: x  / Bili: x / Urobili: x   Blood: x / Protein: x / Nitrite: x   Leuk Esterase: x / RBC: x / WBC x   Sq Epi: x / Non Sq Epi: x / Bacteria: x      CAPILLARY BLOOD GLUCOSE      POCT Blood Glucose.: 97 mg/dL (19 Sep 2023 11:33)  POCT Blood Glucose.: 132 mg/dL (18 Sep 2023 17:37)        Consultant(s) Notes Reviewed:  [x ] YES  [ ] NO    MEDICATIONS  (STANDING):  chlorhexidine 0.12% Liquid 15 milliLiter(s) Oral Mucosa every 12 hours  chlorhexidine 2% Cloths 1 Application(s) Topical <User Schedule>  CRRT Treatment    <Continuous>  cyanocobalamin Injectable 1000 MICROGram(s) IntraMuscular every 24 hours  dexMEDEtomidine Infusion 0.2 MICROgram(s)/kG/Hr (4.4 mL/Hr) IV Continuous <Continuous>  folic acid 1 milliGRAM(s) Oral daily  heparin   Injectable 5000 Unit(s) SubCutaneous every 8 hours  lactulose Syrup 30 Gram(s) Oral every 4 hours  levETIRAcetam  IVPB 500 milliGRAM(s) IV Intermittent every 12 hours  multivitamin 1 Tablet(s) Oral daily  mupirocin 2% Nasal 1 Application(s) Both Nostrils two times a day  nafcillin  IVPB      nafcillin  IVPB 2 Gram(s) IV Intermittent every 4 hours  norepinephrine Infusion 0.05 MICROgram(s)/kG/Min (8.25 mL/Hr) IV Continuous <Continuous>  octreotide  Infusion 50 MICROgram(s)/Hr (10 mL/Hr) IV Continuous <Continuous>  oxymetazoline 0.05% Nasal Spray 1 Spray(s) Both Nostrils two times a day  pantoprazole  Injectable 40 milliGRAM(s) IV Push every 12 hours  Phoxillum Filtration BK 4 / 2.5 5000 milliLiter(s) (1300 mL/Hr) CRRT <Continuous>  piperacillin/tazobactam IVPB.. 4.5 Gram(s) IV Intermittent every 8 hours  prednisoLONE    3 mG/mL Solution (ORAPRED) 40 milliGRAM(s) Oral daily  rifAXIMin 550 milliGRAM(s) Oral two times a day    MEDICATIONS  (PRN):  sodium chloride 0.9% lock flush 10 milliLiter(s) IV Push every 1 hour PRN Pre/post blood products, medications, blood draw, and to maintain line patency      Care Discussed with Consultants/Other Providers [ x] YES  [ ] NO    RADIOLOGY & ADDITIONAL TESTS:

## 2023-09-19 NOTE — PROGRESS NOTE ADULT - SUBJECTIVE AND OBJECTIVE BOX
OTOLARYNGOLOGY (ENT) PROGRESS NOTE    PATIENT: DEON VARGAS  MRN: [id number]  : 66  [id number]  DATE OF SERVICE:  23  			         ID:DEON VARGAS  is a 53 year old male with hepatic chirosis, alcoholism who presented with AMS, admitted for presumed hepatic encephalopathy, coagulapathy hepatorenal syndrome to the MICU for complex medical management. CTH for report of fall is concerning for R extra-axial hematoma.  Patient s/p traumatic intubation, patient noted to have a cuff leak the day after- now s/p another intubation that was slightly traumatic. Glide scope noted to have slight oozing during intubation    Subjective/ Interval:    Patient seen at bedside. Had additional bleeding from mouth and nose last night with large clots pooling outside nose. Bleeding also noted from dialysis port and ETT.. Hg 7.5-> 6.8. Primary team concerned for DIC, labs sent. Throat pack remains in place. Patient taken off sedation and more awake today.   Patient seen at bedside. Bilateral merocel and oral packing removed. Left sided rhinorocket was placed   Patient seen at bedside. Per primary team 20 cc blood suctioned from ETT, per nurse about 700 cc blood suctioned from stomach/bowel. Direct laryngoscopy was performed at bedside with copious clots suctioned from hypopharynx    ALLERGIES:  No Known Allergies      MEDICATIONS:  Antiinfectives:   nafcillin  IVPB      nafcillin  IVPB 2 Gram(s) IV Intermittent every 4 hours  piperacillin/tazobactam IVPB.. 4.5 Gram(s) IV Intermittent every 8 hours  rifAXIMin 550 milliGRAM(s) Oral two times a day    IV fluids:  cyanocobalamin Injectable 1000 MICROGram(s) IntraMuscular every 24 hours  folic acid 1 milliGRAM(s) Oral daily  multivitamin 1 Tablet(s) Oral daily    Hematologic/Anticoagulation:  heparin   Injectable 5000 Unit(s) SubCutaneous every 8 hours    Pain medications/Neuro:  levETIRAcetam  IVPB 500 milliGRAM(s) IV Intermittent every 12 hours  propofol Infusion 10 MICROgram(s)/kG/Min IV Continuous <Continuous>    Endocrine Medications:   octreotide  Infusion 50 MICROgram(s)/Hr IV Continuous <Continuous>    All other standing medications:   chlorhexidine 0.12% Liquid 15 milliLiter(s) Oral Mucosa every 12 hours  chlorhexidine 2% Cloths 1 Application(s) Topical <User Schedule>  CRRT Treatment    <Continuous>  lactulose Syrup 30 Gram(s) Oral every 4 hours  mupirocin 2% Nasal 1 Application(s) Both Nostrils two times a day  norepinephrine Infusion 0.05 MICROgram(s)/kG/Min IV Continuous <Continuous>  oxymetazoline 0.05% Nasal Spray 1 Spray(s) Both Nostrils two times a day  pantoprazole  Injectable 40 milliGRAM(s) IV Push every 12 hours  Phoxillum Filtration BK 4 / 2.5 5000 milliLiter(s) CRRT <Continuous>  Tranexamic acid 2000 milliGRAM(s) 2000 milliGRAM(s) Nasal once    All other PRN medications:    Vital Signs Last 24 Hrs  T(C): 35.9 (19 Sep 2023 18:27), Max: 37.2 (19 Sep 2023 06:04)  T(F): 96.6 (19 Sep 2023 18:27), Max: 99 (19 Sep 2023 06:04)  HR: 87 (19 Sep 2023 18:00) (68 - 117)  BP: 103/55 (19 Sep 2023 18:00) (79/46 - 115/61)  BP(mean): 76 (19 Sep 2023 18:00) (58 - 82)  RR: 22 (19 Sep 2023 18:00) (13 - 34)  SpO2: 93% (19 Sep 2023 18:00) (90% - 99%)    Parameters below as of 19 Sep 2023 18:00  Patient On (Oxygen Delivery Method): ventilator    O2 Concentration (%): 40      09-18 @ 07:01  -   @ 07:00  --------------------------------------------------------  IN:    Dexmedetomidine: 103.4 mL    Enteral Tube Flush: 560 mL    IV PiggyBack: 1315 mL    Norepinephrine: 258.2 mL    Octreotide: 240 mL    Plasma: 300 mL    Propofol: 15.8 mL  Total IN: 2792.4 mL    OUT:    Other (mL): 1146 mL    Rectal Tube (mL): 3000 mL  Total OUT: 4146 mL    Total NET: -1353.6 mL       @ 07:01  -   @ 19:06  --------------------------------------------------------  IN:    Dexmedetomidine: 121 mL    Enteral Tube Flush: 200 mL    IV PiggyBack: 200 mL    Norepinephrine: 244.5 mL    Octreotide: 110 mL    Propofol: 80.1 mL  Total IN: 955.6 mL    OUT:    Dexmedetomidine: 0 mL    Other (mL): 0 mL  Total OUT: 0 mL    Total NET: 955.6 mL              Mode: AC/ CMV (Assist Control/ Continuous Mandatory Ventilation), RR (machine): 16, TV (machine): 450, FiO2: 40, PEEP: 5, ITime: 1, MAP: 9, PIP: 18    PHYSICAL EXAM:  GENERAL: ill appearing, intubated, sedation increased for exam   HEAD:  Atraumatic   EYES: icteric sclera  ENT: oral intubation tube and gastric tube. Copious clots suctioned from hypophyarnx (see procedure note). Left soft palate ecchymosis.   Nares:  left sided rhinorocket in place. Suctioning of dark red clot from right nasal cavity with diffuse bleeding of right anterior septum. 2 merocel and copious afrin was applied to right nasal cavity with stablization of epistaxis.   NECK: Supple  CHEST/LUNG: on vent   ABDOMEN: large and distended but soft and non-tender.  NERVOUS SYSTEM:  sedated    Procedure: direct laryngoscopy  Procedure details/findings: Patient was positioned supine on hospital bed. After appropriate sedation was confirmed a #3 MAC blade was introduced into oral cavity to inspect oropharynx. A glide scope was then used to inspect the oropharynx and hypopharynx for source of bleeding. Bleeding from nasopharynx was visualized and stablized with placement of 2 merocels in right nasal cavity. Copious clots were suctioned from hypopharynx and around the endotracheal tube. Clots were visualized coming from esophagus on patient gag. The pharynx was irrigated. No active bleeding or oozing was visualized in oropharynx or hypopharynx.   Complications: None. Patient tolerated the procedure.       LABS                       8.4    23.96 )-----------( 103      ( 19 Sep 2023 18:46 )             24.5        142  |  104  |  27<H>  ----------------------------<  113<H>  4.0   |  23  |  2.60<H>    Ca    8.2<L>      19 Sep 2023 04:29  Phos  3.5       Mg     2.2         TPro  5.4<L>  /  Alb  3.7  /  TBili  28.0<H>  /  DBili  x   /  AST  110<H>  /  ALT  47<H>  /  AlkPhos  96       Coagulation Studies-   PT/INR - ( 19 Sep 2023 04:29 )   PT: 28.5 sec;   INR: 2.57          PTT - ( 19 Sep 2023 04:29 )  PTT:74.2 sec  Urinalysis Basic - ( 19 Sep 2023 04:29 )      Assessment and Plan:    IMPRESSION: DEON VARGAS  is a 53 year old male with hepatic chirosis, alcoholism who presented with AMS, admitted for presumed hepatic encephalopathy, coagulapathy hepatorenal syndrome to the MICU for complex medical management. CTH for report of fall is concerning for R extra-axial hematoma.  Patient s/p traumatic intubation, patient noted to have a cuff leak the day after- now s/p another intubation that was slightly traumatic. Glide scope noted to have slight oozing during intubation.  Patient not currently on any anticoagulation. Source of bleeding indeterminate at this time but patient appears to be in a coagulopathic state with multiple sites of bleeding suspected. Direct laryngoscopy performed on  visualized copious clots in hypopharynx and coming from esophagus on gag. No source of bleeding or oozing from oral cavity, hypopharynx or oropharyx was seen. Epistaxis contributing to blood loss and clot, bleeding from esophagus cannot be excluded. Patient currently has bilateral nonabsorbable nasal packing in place.    PLAN:  - Abx with staphylococcal coverage while nasal pack is in place   - Packing to be changed by ENT as needed  - Afrin to b/l nares BID   - Mupirocin b/l nares BID  - ENT will continue to follow.     Disposition:   [facility] at [phone] with any questions/concerns.      23 @ 19:06

## 2023-09-19 NOTE — PROGRESS NOTE ADULT - ASSESSMENT
53Y M now with anuric WILLIAM likley 2/2 HRS now requiring CVVHD for clearance , with  signs of recovery, continue RRT for clearance with no fluid removal.    Last 24h volume balance: IN: 2792.4 mL / OUT: 4146 mL / NET: -1353.6 mL  Last 24h UOP: 0    Tolerating CVVHD with the following prescription:  CRRT Treatment:   Modality: CVVHD, Filter: NxStage CAR-505, Target Blood Flow: 300 mL/Min  Target Fluid Balance: No fluid removal (09-19-23 @ 08:57) [Active]  Phoxillum Filtration BK 4 / 2.5: Solution, 5000 milliLiter(s) infuse at 1300 mL/Hr; infuse through CRRT Circuit  Administration Instructions: Continuous Renal Replacement Therapy (CRRT)  Special Instructions: Dialysate (09-19-23 @ 08:57) [Active]    Hemoglobin: 7.6 g/dL (09-19-23 @ 04:29)  Phosphorus: 3.5 mg/dL (09-19-23 @ 04:29)      Anuric ATN iso ?HRS/shock, on CVVHD:   Cont. CVVHD (no fluid removal) will perform daily assessments of blood chemistry and volume status. Pte is currently -1.3L of net during the last 24h (improved from -5.6L from yest)  Remains anuric, requiring vasopressors.   Avoid hypotension, maintain SBP>120 as possible.   Continue with Levo, Albumin PRN.   Daily weights.   Daily labs while on CVVHD,, P 3.5 today, check P levels daily.   Strict I&Os. Keep Duron.   Currently on renally dosed Zosyn @ 4.5g q8h, extended infusion. Nafcillin is poorly dialyzed, no dose adjustment required.

## 2023-09-19 NOTE — PROGRESS NOTE ADULT - ASSESSMENT
52yo M with PMH of Alcohol Use Disorder, Cirrhosis, Anemia, and GERD p/w encephalopathy and found to have decompensated liver disease. Palliative consulted for complex medical decision making in the setting of critical illness.    ·	s/p further discuss with family: continuing current level of care and will re-evaluate on Thurs/Fri when sister arrives, everyone is in agreement that trach/PEG would not be within GOC if patient cannot be medically extubated

## 2023-09-19 NOTE — PROGRESS NOTE ADULT - SUBJECTIVE AND OBJECTIVE BOX
Patient seen and evaluated again while on CVVHD.  BP on low side  Access and CVVHD pressures ok  HR: 79 (09-19-23 @ 15:00)  BP: 94/52 (09-19-23 @ 15:00)  Wt(kg): --  09-19    142  |  104  |  27<H>  ----------------------------<  113<H>  4.0   |  23  |  2.60<H>    Patient is tolerating the adequately  no changes to prescription needed at this time- reviewed with nurses at bedside

## 2023-09-19 NOTE — PROGRESS NOTE ADULT - ASSESSMENT
53M Hx of EtOH abuse and cirrhosis, anemia requiring transfusion in the past, GERD, and a recent un-witnessed fall BIBEMS for altered mental status now intubated and sedated, admitted for further management of  hepatic encephalopathy and hepatorenal syndrome 2/2 alcohol hepatitis and decompensated liver cirrhosis (MELD 41), found to have right frontal hematoma (epidural vs subdural), and possible pancreatitis. Patient DNR/Intubate    Neuro  #Hepatic encelphalopathy  Intubated and sedated. Per wife AAOx3 at baseline. AMS unlikely 2/2 SAH vs SDH seen on CT. Ammonia started at 169 9/14 --> 182 9/16. No need to trend  - Patient is on sedation holiday --> Evaluate mental status while off sedation  - On rifaximin and lactulose syrup via NGT.  - Monitor stool output.  - Continue thiamine, folic acid    #Intra-cranial bleed  CT head with extra-axial hematoma in right frontal lobe. ED at [facility] discussed with neurosurgery and suspect bleed to not be causing AMS. s/p vitamin K, Keppra 1g, and FFP.  - Repeat CT head o/n: no significant change in the small right frontal subdural hematoma. Increased opacification of the paranasal sinuses likely due to intubation.  - Neurosurgery onboard: DENNY  - c/w Heparin.  - Continue to monitor      Cardiovascular  On Levophed for Hepatorenal syndrome. Slightly increased requirements low suspicion for shock.  DENNY    Pulm  #Intubated  #Possible oropharyngeal blood  Findings of considerable blood in oropharynx and nasopharynx requiring suctioning. No additional bleeding. Mouth unpacked. Continued bleeding at the nares rocket inserted, Hgb decreased to 8.3 - 3u pRBC given thus far. ENT came to unpack the nares and throat.   - Plan to wean sedation  - Plan to extubate tomorrow - ENT to unpack nose and mouth as tolerated  - Restarted propofol, fentanyl drips for ENT procedure      #Aspiration PNA  Found to be increasingly altered per wife and s/p unwitnessed fall day prior to admission. Received CTX 1g at [facility].  - Continue Zosyn 4.5g daily (7 days through 9/23)  - sputum culture positive for MSSA - start nafcillin    GI  #Decompensated Liver Cirrhosis  #Hepatic encephalopathy - see Neuro  #?SBP  #Alcohol Hepatitis  History of chronic alcohol use presenting with hepatic encephalopathy ammonia 159-->186 today with distended abdomen, soft and non-tender and diffusely jaundice. Bedside POCUS with small pockets of fluid. Negative hepatitis panel, HIV. Hepatology noted that risks outweigh benefits.  - Prednisolone for severe alcohol hepatitis  - MELD score 41 today  - Maddrey score 200 today  - Child Ferguson score 13-15 (unable to assess mental status while sedated)  - Continue thiamine, folic acid  - GI consult for varices - request EGD    #Melenic stool  1.1L of bright red blood seen in rectal tube.   -Call GI for upper and lower scope.    #Pancreatitis   CT abdomen with some papito-pancreatic stranding. WBC decreasing to 12.8 today. Pt had no abdominal pain on arrival to ED. Tachypneic to 30s-40s upon abdominal palpation. Lipase 1586  -[place] score 0 points  - low risk for mortality, unlikely to be severe pancreatitis.  -Re-assess at 48 hours (9/16 afternoon).  -Pt has hepatorenal syndrome - cannot give maintenance fluid although clinical suspicion is low at this time.  -On fentanyl drip    #Elevated T-bili  Tbili >25, direct bili >16on admission. CTA a/p: normal biliary duct/gallbladder but has hepatic steatosis with hepatomegaly, and portal hypertension.  - T bili stable   - Trend tbili  - LDH elevated at 248, Hapto WNL at 40.    Nephro  #WILLIAM vs WILLIAM on CKD with anuria  #Hepatorenal syndrome  BUN 55, Cr 6.78 suspect hepatorenal syndrome in setting of portal hypertension with splanchnic arterial vasodilation resulting in decreased renal perfusion. CK low at 24.  - Markedly low UOP @ 25cc since admission.   - Stable BUN/Cr on CVVHD no urine in bladder on POCUS  - On Albumin 25% IVPB 100mg q6h for 3 days. Last day today 9/17  - Norepinephrine with goal MAP 65  - Nephrology onboard: recommend CVVHD.  - Strict I/Os.  - Avoid nephrotoxic med and renally dose meds.    #Hyponatremia - RESOLVED  Na 118 on admission, received 3L NS in ED. Started 3% NS @ 25cc/hr.  - Repeat BMP q4h with goal Na 122 by 6PM 9/15, goal Na 126 by 6PM 9/16  - If Na 128 or higher can give dose of DDAVP 1mcg (can give additional dose 6 hours after if needed)  - Urine Na <20  - Urine osm 290 s/p 2L NS  - Serum osm 276  - Nephro onbaord: likely hypovolemic hyponatremia.    #NAGMA - RESOLVED  Bicarb 18, increased AG 18. Pt met 2/4 SIRS criteria on admission. Had episode of loose/mucous stool. AG 14 9/18  - Trend bicarb and AG  -  Bicarb 23, AG 14 today.      Heme  #Epistaxis  No additional bleeding. Mouth unpacked. Continued bleeding at the nares rocket inserted, Hgb decreased to 8.3 - 3u pRBC given thus far. ENT came to unpack the nares and throat.   - Plan to wean sedation to aseess mental state    #Anemia - stable.  Hb 7.3, .5 likely in setting of alcohol use disorder. History of hemorrhoids. Has ecchymosis on flank however no active bleed and known history of anemia. Partner denies history of esophageal varices. INR 2.5. S/p 1 unit pRBC and 1 unit FFP overnight, Hb 7.6 today.  - Continue Vitamin b12, folic acid 1mg daily.  - f/u serum b12 and folate.  - Type & Screen  - Transfuse for Hb <7 or <8 with active bleed  - LDH high, haptoglobin WNL  - Iron 89, TIBC low <17, ferritin elevated 3,417.   - Coag dysfunction 2/2 liver dysfunction.  - Start Heparin AC    #Thrombocytopenia - worsening.  Platelet 115, likely 2/2 alcohol use disorder. 73 today, likely in the setting of critical illness/alcoholic cirrhosis.   - Trend platelets  - Transfuse <10, <20 with fever or <50 with bleeding    ID  #Sepsis  Met 2/4 SIRS criteria (leukocytosis, tachycardic) with suspected asp PNA vs SBP. Blood and urine cultures negative at 12 hours.  Sputum culture positive for MSSA  - f/u blood, urine cultures at 48 hours  - continue nafcillin  - continue zosyn 4.5 through 9/23    Fluids: n/a  Electrolytes: Caution in setting of suspected WILLIAM  Nutrition: NPO  DVT ppx: Heparin 5000 q8h (prothrombotic state)  GI ppx: Protonix  Code: DNR/Intubate  Dispo: MICU 53M Hx of EtOH abuse and cirrhosis, anemia requiring transfusion in the past, GERD, and a recent un-witnessed fall BIBEMS for altered mental status now intubated and sedated, admitted for further management of  hepatic encephalopathy and hepatorenal syndrome 2/2 alcohol hepatitis and decompensated liver cirrhosis (MELD 41), found to have right frontal hematoma (epidural vs subdural), and possible pancreatitis. Patient DNR/Intubate    Neuro  #Hepatic encelphalopathy  Intubated and sedated. Per wife AAOx3 at baseline. AMS unlikely 2/2 SAH vs SDH seen on CT. Ammonia started at 169 9/14 --> 182 9/16. No need to trend  - Patient is on sedation holiday --> Evaluate mental status while off sedation  - On rifaximin and lactulose syrup via NGT.  - Monitor stool output.  - Continue thiamine, folic acid    #Intra-cranial bleed  CT head with extra-axial hematoma in right frontal lobe. ED at [facility] discussed with neurosurgery and suspect bleed to not be causing AMS. s/p vitamin K, Keppra 1g, and FFP.  - Repeat CT head o/n: no significant change in the small right frontal subdural hematoma. Increased opacification of the paranasal sinuses likely due to intubation.  - Neurosurgery onboard: DENNY  - c/w Heparin.  - Continue to monitor      Cardiovascular  On Levophed for Hepatorenal syndrome. Slightly increased requirements low suspicion for shock.  DENNY    Pulm  #Intubated  #Possible oropharyngeal blood  Findings of considerable blood in oropharynx and nasopharynx requiring suctioning. No additional bleeding. Mouth unpacked. Continued bleeding at the nares rocket inserted, Hgb decreased to 8.3 - 3u pRBC given thus far. ENT came to unpack the nares and throat.   - Plan to wean sedation  - Plan to extubate tomorrow - ENT to unpack nose and mouth as tolerated  - Restarted propofol, fentanyl drips for ENT procedure      #Aspiration PNA  Found to be increasingly altered per wife and s/p unwitnessed fall day prior to admission. Received CTX 1g at [facility].  - Continue Zosyn 4.5g daily (7 days through 9/23)  - sputum culture positive for MSSA - start nafcillin    GI  #Decompensated Liver Cirrhosis  #Hepatic encephalopathy - see Neuro  #?SBP  #Alcohol Hepatitis  History of chronic alcohol use presenting with hepatic encephalopathy ammonia 159-->186 today with distended abdomen, soft and non-tender and diffusely jaundice. Bedside POCUS with small pockets of fluid. Negative hepatitis panel, HIV. Hepatology noted that risks outweigh benefits.  - Prednisolone for severe alcohol hepatitis  - MELD score 41 today  - Maddrey score 200 today --> Radha score  - Child Ferguson score 13-15 (unable to assess mental status while sedated)  - Continue thiamine, folic acid  - GI consult for varices - request EGD    #Melenic stool  1.1L of bright red blood seen in rectal tube.   -Call GI for upper and lower scope.    #Pancreatitis   CT abdomen with some papito-pancreatic stranding. WBC decreasing to 12.8 today. Pt had no abdominal pain on arrival to ED. Tachypneic to 30s-40s upon abdominal palpation. Lipase 1586  -[place] score 0 points  - low risk for mortality, unlikely to be severe pancreatitis.  -Re-assess at 48 hours (9/16 afternoon).  -Pt has hepatorenal syndrome - cannot give maintenance fluid although clinical suspicion is low at this time.  -On fentanyl drip    #Elevated T-bili  Tbili >25, direct bili >16on admission. CTA a/p: normal biliary duct/gallbladder but has hepatic steatosis with hepatomegaly, and portal hypertension.  - T bili stable   - Trend tbili  - LDH elevated at 248, Hapto WNL at 40.    Nephro  #WILLIAM vs WILLIAM on CKD with anuria  #Hepatorenal syndrome  BUN 55, Cr 6.78 suspect hepatorenal syndrome in setting of portal hypertension with splanchnic arterial vasodilation resulting in decreased renal perfusion. CK low at 24.  - Markedly low UOP @ 25cc since admission.   - Stable BUN/Cr on CVVHD no urine in bladder on POCUS  - On Albumin 25% IVPB 100mg q6h for 3 days. Last day today 9/17  - Norepinephrine with goal MAP 65  - Nephrology onboard: recommend CVVHD.  - Strict I/Os.  - Avoid nephrotoxic med and renally dose meds.    #Hyponatremia - RESOLVED  Na 118 on admission, received 3L NS in ED. Started 3% NS @ 25cc/hr.  - Repeat BMP q4h with goal Na 122 by 6PM 9/15, goal Na 126 by 6PM 9/16  - If Na 128 or higher can give dose of DDAVP 1mcg (can give additional dose 6 hours after if needed)  - Urine Na <20  - Urine osm 290 s/p 2L NS  - Serum osm 276  - Nephro onbaord: likely hypovolemic hyponatremia.    #NAGMA - RESOLVED  Bicarb 18, increased AG 18. Pt met 2/4 SIRS criteria on admission. Had episode of loose/mucous stool. AG 14 9/18  - Trend bicarb and AG  -  Bicarb 23, AG 14 today.      Heme  #Epistaxis  No additional bleeding. Mouth unpacked. Continued bleeding at the nares rocket inserted, Hgb decreased to 7.6 - 3u pRBC given thus far. ENT came to unpack the nares and throat but had continued bleeding. Additional bleeding per rectum may also be contributing to anemia  - trend CBC to monitor Hgb    #Anemia - stable.  Hb 7.3, .5 likely in setting of alcohol use disorder. History of hemorrhoids. Has ecchymosis on flank however no active bleed and known history of anemia. Partner denies history of esophageal varices. INR 2.5. S/p 1 unit pRBC and 1 unit FFP overnight, Hb 7.6 today.  - Continue Vitamin b12, folic acid 1mg daily.  - f/u serum b12 and folate.  - Type & Screen  - Transfuse for Hb <7 or <8 with active bleed  - LDH high, haptoglobin WNL  - Iron 89, TIBC low <17, ferritin elevated 3,417.   - Coag dysfunction 2/2 liver dysfunction.  - Start Heparin AC    #Thrombocytopenia - worsening.  Platelet 115, likely 2/2 alcohol use disorder. 73 today, likely in the setting of critical illness/alcoholic cirrhosis.   - Trend platelets  - Transfuse <10, <20 with fever or <50 with bleeding    ID  #Sepsis  Met 2/4 SIRS criteria (leukocytosis, tachycardic) with suspected asp PNA vs SBP. Blood and urine cultures negative at 12 hours.  Sputum culture positive for MSSA  - f/u blood, urine cultures at 48 hours  - continue nafcillin  - continue zosyn 4.5 through 9/23    Fluids: n/a  Electrolytes: Caution in setting of suspected WILLIAM  Nutrition: NPO  DVT ppx: Heparin 5000 q8h (prothrombotic state)  GI ppx: Protonix  Code: DNR/Intubate  Dispo: MICU

## 2023-09-19 NOTE — PROGRESS NOTE ADULT - SUBJECTIVE AND OBJECTIVE BOX
Patient is evaluated at bedside. Intubated, tolerating CVVHD.     Review of Systems:  Unable to obtain.     PHYSICAL EXAM:  GENERAL: Sedated, ETT/MV, requiring pressors, critically ill.   HEENT:  icteric sclera, dry blood around his mouth.   CHEST/LUNG: Clear to auscultation bilaterally; no rales, rhonchi, wheezing or rubs.   HEART: Regular rate and rhythm, no murmurs.   ABDOMEN: distended, tympanic but soft.  EXTREMITIES: . No clubbing, cyanosis, or edema   NERVOUS SYSTEM:  RAAS -3, sedated.   SKIN: diffuse jaundice  Access: [facility] HD cath accessed, placed on 9/15.    Allergies:  No Known Allergies    Hospital Medications:   MEDICATIONS  (STANDING):  chlorhexidine 0.12% Liquid 15 milliLiter(s) Oral Mucosa every 12 hours  chlorhexidine 2% Cloths 1 Application(s) Topical <User Schedule>  CRRT Treatment    <Continuous>  cyanocobalamin Injectable 1000 MICROGram(s) IntraMuscular every 24 hours  dexMEDEtomidine Infusion 0.2 MICROgram(s)/kG/Hr (4.4 mL/Hr) IV Continuous <Continuous>  folic acid 1 milliGRAM(s) Oral daily  heparin   Injectable 5000 Unit(s) SubCutaneous every 8 hours  lactulose Syrup 30 Gram(s) Oral every 4 hours  levETIRAcetam  IVPB 500 milliGRAM(s) IV Intermittent every 12 hours  multivitamin 1 Tablet(s) Oral daily  mupirocin 2% Nasal 1 Application(s) Both Nostrils two times a day  nafcillin  IVPB 2 Gram(s) IV Intermittent every 4 hours  nafcillin  IVPB      norepinephrine Infusion 0.05 MICROgram(s)/kG/Min (8.25 mL/Hr) IV Continuous <Continuous>  octreotide  Infusion 50 MICROgram(s)/Hr (10 mL/Hr) IV Continuous <Continuous>  oxymetazoline 0.05% Nasal Spray 1 Spray(s) Both Nostrils two times a day  pantoprazole  Injectable 40 milliGRAM(s) IV Push every 12 hours  Phoxillum Filtration BK 4 / 2.5 5000 milliLiter(s) (1300 mL/Hr) CRRT <Continuous>  piperacillin/tazobactam IVPB.. 4.5 Gram(s) IV Intermittent every 8 hours  prednisoLONE    3 mG/mL Solution (ORAPRED) 40 milliGRAM(s) Oral daily  rifAXIMin 550 milliGRAM(s) Oral two times a day    VITALS:  T(F): 97.9 (09-19-23 @ 13:49), Max: 99 (09-19-23 @ 06:04)  HR: 84 (09-19-23 @ 13:00)  BP: 92/51 (09-19-23 @ 13:00)  RR: 16 (09-19-23 @ 13:00)  SpO2: 93% (09-19-23 @ 13:00)  Wt(kg): --    09-17 @ 07:01  -  09-18 @ 07:00  --------------------------------------------------------  IN: 3004.4 mL / OUT: 8615 mL / NET: -5610.6 mL    09-18 @ 07:01  -  09-19 @ 07:00  --------------------------------------------------------  IN: 2792.4 mL / OUT: 4146 mL / NET: -1353.6 mL    09-19 @ 07:01  -  09-19 @ 14:07  --------------------------------------------------------  IN: 604 mL / OUT: 0 mL / NET: 604 mL      LABS:  09-19    142  |  104  |  27<H>  ----------------------------<  113<H>  4.0   |  23  |  2.60<H>    Ca    8.2<L>      19 Sep 2023 04:29  Phos  3.5     09-19  Mg     2.2     09-19    TPro  5.4<L>  /  Alb  3.7  /  TBili  28.0<H>  /  DBili      /  AST  110<H>  /  ALT  47<H>  /  AlkPhos  96  09-19                          7.6    15.90 )-----------( 107      ( 19 Sep 2023 04:29 )             21.6

## 2023-09-19 NOTE — PROGRESS NOTE ADULT - SUBJECTIVE AND OBJECTIVE BOX
[facility]  Domingo Bennett, Palliative Care Attending  Contact Info: Call [phone] (HEAL Line) or message on Microsoft Teams (Domingo Bennett)    SUBJECTIVE AND OBJECTIVE:  INTERVAL HPI/OVERNIGHT EVENTS: Interval events noted. Patient unable to participate in interview. Tolerating CPAP. Received several Fentanyl pushes overnight and was started on Precedex this morning. Pulling on restraints but otherwise not showing meaningful activity See patient's PRN use for the past 24hrs noted below. Comprehensive symptom assessment and GOC exploration as noted below. Extensive time spent discussing plan of care with family. No unexpected adverse effects of opiates noted.    ALLERGIES:  No Known Allergies    MEDICATIONS  (STANDING):  chlorhexidine 0.12% Liquid 15 milliLiter(s) Oral Mucosa every 12 hours  chlorhexidine 2% Cloths 1 Application(s) Topical <User Schedule>  CRRT Treatment    <Continuous>  cyanocobalamin Injectable 1000 MICROGram(s) IntraMuscular every 24 hours  dexMEDEtomidine Infusion 0.2 MICROgram(s)/kG/Hr (4.4 mL/Hr) IV Continuous <Continuous>  folic acid 1 milliGRAM(s) Oral daily  heparin   Injectable 5000 Unit(s) SubCutaneous every 8 hours  lactulose Syrup 30 Gram(s) Oral every 4 hours  levETIRAcetam  IVPB 500 milliGRAM(s) IV Intermittent every 12 hours  multivitamin 1 Tablet(s) Oral daily  mupirocin 2% Nasal 1 Application(s) Both Nostrils two times a day  nafcillin  IVPB 2 Gram(s) IV Intermittent every 4 hours  nafcillin  IVPB      norepinephrine Infusion 0.05 MICROgram(s)/kG/Min (8.25 mL/Hr) IV Continuous <Continuous>  octreotide  Infusion 50 MICROgram(s)/Hr (10 mL/Hr) IV Continuous <Continuous>  oxymetazoline 0.05% Nasal Spray 1 Spray(s) Both Nostrils two times a day  pantoprazole  Injectable 40 milliGRAM(s) IV Push every 12 hours  Phoxillum Filtration BK 4 / 2.5 5000 milliLiter(s) (1300 mL/Hr) CRRT <Continuous>  piperacillin/tazobactam IVPB.. 4.5 Gram(s) IV Intermittent every 8 hours  prednisoLONE    3 mG/mL Solution (ORAPRED) 40 milliGRAM(s) Oral daily  rifAXIMin 550 milliGRAM(s) Oral two times a day    MEDICATIONS  (PRN):  sodium chloride 0.9% lock flush 10 milliLiter(s) IV Push every 1 hour PRN Pre/post blood products, medications, blood draw, and to maintain line patency    Analgesic Use (Scheduled and PRNs) for past 24 hours:  dexMEDEtomidine Infusion   4.4 mL/Hr IV Continuous (09-18-23 @ 22:05)   4.4 mL/Hr IV Continuous (09-18-23 @ 21:38)  dexMEDEtomidine Infusion   4.4 mL/Hr IV Continuous (09-19-23 @ 09:01)  fentaNYL    Injectable   25 MICROGram(s) IV Push (09-19-23 @ 03:46)  fentaNYL    Injectable   25 MICROGram(s) IV Push (09-18-23 @ 19:47)  fentaNYL    Injectable   25 MICROGram(s) IV Push (09-19-23 @ 08:41)  levETIRAcetam  IVPB   400 mL/Hr IV Intermittent (09-19-23 @ 05:12)   400 mL/Hr IV Intermittent (09-18-23 @ 17:32)    ITEMS UNCHECKED ARE NOT PRESENT  PRESENT SYMPTOMS/REVIEW OF SYSTEMS: [x]Unable to obtain due to poor mentation   Source if other than patient:  []Family   []Team     Pain: [x] yes [] no  QOL impact -  Location -  Aggravating factors -  Quality -  Radiation -  Timing -  Severity (0-10 scale) -  Minimal acceptable level (0-10 scale) -    PAINAD Score: 4  CPOT Score: 3    Dyspnea:                           []Mild  []Moderate []Severe  Anxiety:                             []Mild []Moderate []Severe  Fatigue:                             []Mild []Moderate []Severe  Nausea:                             []Mild []Moderate []Severe  Loss of appetite:              []Mild []Moderate []Severe  Constipation:                    []Mild []Moderate []Severe    Other Symptoms:  [x]All other review of systems negative     Palliative Performance Status Version 2: 10%    GENERAL:  [] NAD []Alert [x]Lethargic  []Cachexia  [x]Unarousable  []Verbal  [x]Non-Verbal  BEHAVIORAL:   []Anxiety  [x]Delirium []Agitation []Cooperative []Oriented x  HEENT:  []Normal  [] Moist Mucous Membranes []Dry mouth   [x]ET Tube/Trach  []Oral lesions  PULMONARY:   []Clear []Tachypnea  []Audible excessive secretions  [x]Normal Work of Breathing []Labored Breathing  [x]Rhonchi []Crackles []Wheezing  CARDIOVASCULAR:    [x]Regular Rate [x]Regular Rhythm []Irregular []Tachy  []Be  GASTROINTESTINAL:  [x]Soft  [x]Distended   [x]+BS  [x]Non tender []Tender  []PEG [x]OGT/ NGT  Last BM:  GENITOURINARY:  []Normal [] Incontinent   []Oliguria/Anuria   [x]Duron  MUSCULOSKELETAL:   []Normal Extremities  [x]Weakness  [x]Bed/Wheelchair bound []Edema  NEUROLOGIC:   []No focal deficits  []Cognitive impairment  [x]Dysphagia []Dysarthria []Paresis [x]Encephalopathic  SKIN:   [x]Normal   []Pressure ulcer(s)  []Rash    CRITICAL CARE:  [x]Shock Present  [ ]Septic [ ]Cardiogenic [ ]Neurologic [ ]Hypovolemic  [x] Vasopressors [ ]Inotropes   [x]Respiratory failure present [x]Mechanical Ventilation [ ]Non-invasive ventilatory support [ ]High-Flow  [x]Acute  [ ]Chronic [x]Hypoxic  [ ]Hypercarbic   [x]Other organ failure: Renal    Vital Signs Last 24 Hrs  T(C): 36.6 (19 Sep 2023 13:49), Max: 37.2 (19 Sep 2023 06:04)  T(F): 97.9 (19 Sep 2023 13:49), Max: 99 (19 Sep 2023 06:04)  HR: 79 (19 Sep 2023 15:00) (68 - 117)  BP: 94/52 (19 Sep 2023 15:00) (79/46 - 119/66)  BP(mean): 70 (19 Sep 2023 15:00) (58 - 84)  RR: 19 (19 Sep 2023 15:00) (13 - 34)  SpO2: 93% (19 Sep 2023 15:00) (90% - 99%)    Parameters below as of 19 Sep 2023 15:00  Patient On (Oxygen Delivery Method): ventilator  O2 Concentration (%): 40    LABS: Personally reviewed and interpreted                      7.6    15.90 )-----------( 107      ( 19 Sep 2023 04:29 )             21.6   09-19    142  |  104  |  27<H>  ----------------------------<  113<H>  4.0   |  23  |  2.60<H>    Ca    8.2<L>      19 Sep 2023 04:29  Phos  3.5     09-19  Mg     2.2     09-19  TPro  5.4<L>  /  Alb  3.7  /  TBili  28.0<H>  /  DBili  x   /  AST  110<H>  /  ALT  47<H>  /  AlkPhos  96  09-19    RADIOLOGY & ADDITIONAL STUDIES: None new    DISCUSSION OF CASE: Significant Other, Sister, Friend - to provide updates and emotional support; Primary Team/RN - to discuss plan of care

## 2023-09-19 NOTE — PROGRESS NOTE ADULT - ASSESSMENT
53M h/o EtOH cirrhosis, anemia, GERD p/w AMS (s/p intubation), found with acute renal failure, coagulopathy, and MSSA PNA.    Unclear baseline labs, prior workup, or prior history of decompensation. No acute hepatitis. No tappable pocket, no PVT. Renal failure in cirrhosis portends poor prognosis though currently with some signs of improvement with treatment of PNA.    While pt has anemia requiring transfusion, variceal bleed appears less likely given stable/decreasing pressor requirements. More likely due to ongoing oozing from nasal region / oropharynx and BRBPR likely from rectal trauma/ulcer 2/2 rectal tube.    Can further evaluate transplant candidacy if he is extubated.    Recommendations:  - Please check fibrinogen  - Can tentatively plan for EGD tomorrow, but please order 1U cryo to start during procedure  - Please avoid rectal tube or other rectal instrumentation  - Can decrease lactulose to TID and continue rifaximin  - Would be cautious of steroids given renal failure and bleeding, and if continue would watch very closely for bleeding or infection  - Continue norepi / albumin and can add vasopressin for hepatorenal syndrome treatment, but not a candidate for terlipressin given Cr >5 and vent support    We will continue to follow. Case also discussed with Dr. Holloway. Please see attending addendum for final recommendations.    Simon (Shimon Fisher MD  Gastroenterology Fellow  Pager (M-F 7a-4s): [phone]  Pager (after hours): Please call  for on-call fellow

## 2023-09-20 NOTE — CONSULT NOTE ADULT - SUBJECTIVE AND OBJECTIVE BOX
INFECTIOUS DISEASES INITIAL CONSULT NOTE    HPI:  CC: AMS  HPI: 53M PMH of EtOH abuse and cirrhosis, anemia requiring transfusion in the past, and GERD, brought in by EMS for altered mental status. Patient's partner states that he had a mechanical fall night prior, afterwards told her not to call EMS to take him to the hospital. On day of admission, patient was less responsive than usual, so she called EMS to bring him in.  Partner states that patient developed extensive bruising to left abdomen and flank 2 to 3 days ago, prior to the fall. Pt normally drinks 1 bottle of vodka daily but has been cutting back to a few shots. Admitted to MICU for intubation i/s/o AMS and airway protection. Found to have hepatorenal syndrome and likely hepatic encephalopathy 2/2 alcohol hepatitis and decompensated liver cirrhosis, a right frontal subdural hematoma, WILLIAM with oliguria, and SIRS 2/2 aspiration PA vs SBP. On Levophed drip. Pt had large amounts of blood in mouth during intubation. Cuff was exchanged in the MICU due to leak, and Bronchoscopy was also performed which identified no acute bleed. There continued to be significant bleeding from the nares and mouth with a large clot forming outside the nares. ENT was consulted to come and pack the mouth and nose, although they could not identify a source of the bleed. 9/19 the patient has had 20cc of saleem blood drained from the ET tube and melenic stool out of the rectal tube.     PMHx: EtOH abuse and cirrhosis, anemia, and GERD   PSHx:   Meds: See med rec  Allergies: NKDA  Social: see below   (14 Sep 2023 19:54)      PAST MEDICAL & SURGICAL HISTORY:  Cirrhosis      History of alcohol use disorder      GERD (gastroesophageal reflux disease)            Review of Systems:   Constitutional, eyes, ENT, cardiovascular, respiratory, gastrointestinal, genitourinary, integumentary, neurological, psychiatric and heme/lymph are otherwise negative other than noted above       ANTIBIOTICS:  MEDICATIONS  (STANDING):  chlorhexidine 0.12% Liquid 15 milliLiter(s) Oral Mucosa every 12 hours  chlorhexidine 2% Cloths 1 Application(s) Topical <User Schedule>  CRRT Treatment    <Continuous>  cyanocobalamin Injectable 1000 MICROGram(s) IntraMuscular every 24 hours  folic acid 1 milliGRAM(s) Oral daily  heparin   Injectable 5000 Unit(s) SubCutaneous every 8 hours  lactulose Syrup 30 Gram(s) Oral every 8 hours  levETIRAcetam  IVPB 500 milliGRAM(s) IV Intermittent every 12 hours  meropenem  IVPB 1000 milliGRAM(s) IV Intermittent every 8 hours  multivitamin 1 Tablet(s) Oral daily  mupirocin 2% Nasal 1 Application(s) Both Nostrils two times a day  norepinephrine Infusion 0.05 MICROgram(s)/kG/Min (4.13 mL/Hr) IV Continuous <Continuous>  octreotide  Infusion 50 MICROgram(s)/Hr (10 mL/Hr) IV Continuous <Continuous>  oxymetazoline 0.05% Nasal Spray 1 Spray(s) Both Nostrils two times a day  pantoprazole  Injectable 40 milliGRAM(s) IV Push every 12 hours  Phoxillum Filtration BK 4 / 2.5 5000 milliLiter(s) (4000 mL/Hr) CRRT <Continuous>  propofol Infusion 10 MICROgram(s)/kG/Min (5.28 mL/Hr) IV Continuous <Continuous>  rifAXIMin 550 milliGRAM(s) Oral two times a day  Tranexamic acid 2000 milliGRAM(s) 2000 milliGRAM(s) Nasal once  vasopressin Infusion 0.04 Unit(s)/Min (6 mL/Hr) IV Continuous <Continuous>    MEDICATIONS  (PRN):  sodium chloride 0.9% lock flush 10 milliLiter(s) IV Push every 1 hour PRN Pre/post blood products, medications, blood draw, and to maintain line patency      Allergies    No Known Allergies    Intolerances        SOCIAL HISTORY:    FAMILY HISTORY:   no FH leading to current infection    Vital Signs Last 24 Hrs  T(C): 37 (20 Sep 2023 14:22), Max: 37 (20 Sep 2023 14:22)  T(F): 98.6 (20 Sep 2023 14:22), Max: 98.6 (20 Sep 2023 14:22)  HR: 95 (20 Sep 2023 15:00) (76 - 113)  BP: 95/52 (20 Sep 2023 11:40) (81/49 - 114/57)  BP(mean): 70 (20 Sep 2023 11:40) (61 - 80)  RR: 14 (20 Sep 2023 15:00) (14 - 25)  SpO2: 95% (20 Sep 2023 15:00) (91% - 97%)    Parameters below as of 20 Sep 2023 15:00  Patient On (Oxygen Delivery Method): ventilator    O2 Concentration (%): 40    09-19-23 @ 07:01  -  09-20-23 @ 07:00  --------------------------------------------------------  IN: 2740.2 mL / OUT: 800 mL / NET: 1940.2 mL    09-20-23 @ 07:01  -  09-20-23 @ 16:03  --------------------------------------------------------  IN: 1341.3 mL / OUT: 0 mL / NET: 1341.3 mL        PHYSICAL EXAM:  Constitutional: alert, NAD, positoned upright  Eyes: the sclera and conjunctiva were normal.   ENT: the ears and nose were normal in appearance.   Neck: the appearance of the neck was normal and the neck was supple.   Pulmonary: intubated, rhonchi b/l lower lung fields R>L, intubated  Heart: heart rate was normal and rhythm regular, normal S1 and S2  Skin: jaundiced  Abdomen: distended, rectal tube  Neurological: aaox0, sedated      LABS:                        7.1    27.64 )-----------( 102      ( 20 Sep 2023 12:43 )             20.9     09-20    140  |  103  |  28<H>  ----------------------------<  90  4.7   |  19<L>  |  2.27<H>    Ca    7.4<L>      20 Sep 2023 12:22  Phos  5.1     09-20  Mg     2.3     09-20    TPro  5.1<L>  /  Alb  3.2<L>  /  TBili  27.5<H>  /  DBili  x   /  AST  140<H>  /  ALT  57<H>  /  AlkPhos  99  09-20    PT/INR - ( 20 Sep 2023 12:22 )   PT: 26.8 sec;   INR: 2.41          PTT - ( 20 Sep 2023 03:49 )  PTT:68.5 sec  Urinalysis Basic - ( 20 Sep 2023 12:22 )    Color: x / Appearance: x / SG: x / pH: x  Gluc: 90 mg/dL / Ketone: x  / Bili: x / Urobili: x   Blood: x / Protein: x / Nitrite: x   Leuk Esterase: x / RBC: x / WBC x   Sq Epi: x / Non Sq Epi: x / Bacteria: x        MICROBIOLOGY:    RADIOLOGY & ADDITIONAL STUDIES:   INFECTIOUS DISEASES INITIAL CONSULT NOTE    HPI:  CC: AMS  HPI: 53M PMH of EtOH abuse and cirrhosis, anemia requiring transfusion in the past, and GERD, brought in by EMS for altered mental status. Patient's partner states that he had a mechanical fall night prior, afterwards told her not to call EMS to take him to the hospital. On day of admission, patient was less responsive than usual, so she called EMS to bring him in.  Partner states that patient developed extensive bruising to left abdomen and flank 2 to 3 days ago, prior to the fall. Pt normally drinks 1 bottle of vodka daily but has been cutting back to a few shots. Admitted to MICU for intubation i/s/o AMS and airway protection. Found to have hepatorenal syndrome and likely hepatic encephalopathy 2/2 alcohol hepatitis and decompensated liver cirrhosis, a right frontal subdural hematoma, WILLIAM with oliguria, and SIRS 2/2 aspiration PA vs SBP. On Levophed drip for developing shock possible due to intraabdominal source. Pt had large amounts of blood in mouth during intubation. Bronchoscopy was also performed which identified no acute bleed. There continued to be significant bleeding from the nares and mouth with a large clot forming outside the nares. ENT was consulted to come and pack the mouth and nose, although they could not identify a source of the bleed. 9/19 the patient has had 20cc of saleem blood drained from the ET tube and melenic stool out of the rectal tube. Pall Care consulted, made DNR. ID consulted.    PMHx: EtOH abuse and cirrhosis, anemia, and GERD   PSHx:   Meds: See med rec  Allergies: NKDA  Social: see below   (14 Sep 2023 19:54)      PAST MEDICAL & SURGICAL HISTORY:  Cirrhosis      History of alcohol use disorder      GERD (gastroesophageal reflux disease)            Review of Systems:   Constitutional, eyes, ENT, cardiovascular, respiratory, gastrointestinal, genitourinary, integumentary, neurological, psychiatric and heme/lymph are otherwise negative other than noted above       ANTIBIOTICS:  MEDICATIONS  (STANDING):  chlorhexidine 0.12% Liquid 15 milliLiter(s) Oral Mucosa every 12 hours  chlorhexidine 2% Cloths 1 Application(s) Topical <User Schedule>  CRRT Treatment    <Continuous>  cyanocobalamin Injectable 1000 MICROGram(s) IntraMuscular every 24 hours  folic acid 1 milliGRAM(s) Oral daily  heparin   Injectable 5000 Unit(s) SubCutaneous every 8 hours  lactulose Syrup 30 Gram(s) Oral every 8 hours  levETIRAcetam  IVPB 500 milliGRAM(s) IV Intermittent every 12 hours  meropenem  IVPB 1000 milliGRAM(s) IV Intermittent every 8 hours  multivitamin 1 Tablet(s) Oral daily  mupirocin 2% Nasal 1 Application(s) Both Nostrils two times a day  norepinephrine Infusion 0.05 MICROgram(s)/kG/Min (4.13 mL/Hr) IV Continuous <Continuous>  octreotide  Infusion 50 MICROgram(s)/Hr (10 mL/Hr) IV Continuous <Continuous>  oxymetazoline 0.05% Nasal Spray 1 Spray(s) Both Nostrils two times a day  pantoprazole  Injectable 40 milliGRAM(s) IV Push every 12 hours  Phoxillum Filtration BK 4 / 2.5 5000 milliLiter(s) (4000 mL/Hr) CRRT <Continuous>  propofol Infusion 10 MICROgram(s)/kG/Min (5.28 mL/Hr) IV Continuous <Continuous>  rifAXIMin 550 milliGRAM(s) Oral two times a day  Tranexamic acid 2000 milliGRAM(s) 2000 milliGRAM(s) Nasal once  vasopressin Infusion 0.04 Unit(s)/Min (6 mL/Hr) IV Continuous <Continuous>    MEDICATIONS  (PRN):  sodium chloride 0.9% lock flush 10 milliLiter(s) IV Push every 1 hour PRN Pre/post blood products, medications, blood draw, and to maintain line patency      Allergies    No Known Allergies    Intolerances        SOCIAL HISTORY:    FAMILY HISTORY:   no FH leading to current infection    Vital Signs Last 24 Hrs  T(C): 37 (20 Sep 2023 14:22), Max: 37 (20 Sep 2023 14:22)  T(F): 98.6 (20 Sep 2023 14:22), Max: 98.6 (20 Sep 2023 14:22)  HR: 95 (20 Sep 2023 15:00) (76 - 113)  BP: 95/52 (20 Sep 2023 11:40) (81/49 - 114/57)  BP(mean): 70 (20 Sep 2023 11:40) (61 - 80)  RR: 14 (20 Sep 2023 15:00) (14 - 25)  SpO2: 95% (20 Sep 2023 15:00) (91% - 97%)    Parameters below as of 20 Sep 2023 15:00  Patient On (Oxygen Delivery Method): ventilator    O2 Concentration (%): 40    09-19-23 @ 07:01  -  09-20-23 @ 07:00  --------------------------------------------------------  IN: 2740.2 mL / OUT: 800 mL / NET: 1940.2 mL    09-20-23 @ 07:01  -  09-20-23 @ 16:03  --------------------------------------------------------  IN: 1341.3 mL / OUT: 0 mL / NET: 1341.3 mL        PHYSICAL EXAM:  Constitutional: alert, NAD, positoned upright  Eyes: the sclera and conjunctiva were normal.   ENT: the ears and nose were normal in appearance.   Neck: the appearance of the neck was normal and the neck was supple.   Pulmonary: intubated, rhonchi b/l lower lung fields R>L, intubated  Heart: heart rate was normal and rhythm regular, normal S1 and S2  Skin: jaundiced  Abdomen: distended, rectal tube  Neurological: aaox0, sedated      LABS:                        7.1    27.64 )-----------( 102      ( 20 Sep 2023 12:43 )             20.9     09-20    140  |  103  |  28<H>  ----------------------------<  90  4.7   |  19<L>  |  2.27<H>    Ca    7.4<L>      20 Sep 2023 12:22  Phos  5.1     09-20  Mg     2.3     09-20    TPro  5.1<L>  /  Alb  3.2<L>  /  TBili  27.5<H>  /  DBili  x   /  AST  140<H>  /  ALT  57<H>  /  AlkPhos  99  09-20    PT/INR - ( 20 Sep 2023 12:22 )   PT: 26.8 sec;   INR: 2.41          PTT - ( 20 Sep 2023 03:49 )  PTT:68.5 sec  Urinalysis Basic - ( 20 Sep 2023 12:22 )    Color: x / Appearance: x / SG: x / pH: x  Gluc: 90 mg/dL / Ketone: x  / Bili: x / Urobili: x   Blood: x / Protein: x / Nitrite: x   Leuk Esterase: x / RBC: x / WBC x   Sq Epi: x / Non Sq Epi: x / Bacteria: x        MICROBIOLOGY:    RADIOLOGY & ADDITIONAL STUDIES:

## 2023-09-20 NOTE — PROCEDURE NOTE - NSPROCDETAILS_GEN_ALL_CORE
location identified, draped/prepped, sterile technique used/blood seen on insertion/dressing applied/flushes easily/secured in place/sterile technique, catheter placed
audible air bolus/placement confirmed by auscultation/orogastric
guidewire recovered/lumen(s) aspirated and flushed/sterile dressing applied/sterile technique, catheter placed/ultrasound guidance with use of sterile gel and probe cove
location identified, draped/prepped, sterile technique used, needle inserted/introduced/positive blood return obtained via catheter/connected to a pressurized flush line/sutured in place/hemostasis with direct pressure, dressing applied/Seldinger technique/all materials/supplies accounted for at end of procedure
guidewire recovered/lumen(s) aspirated and flushed/sterile dressing applied/sterile technique, catheter placed/ultrasound guidance with use of sterile gel and probe cove

## 2023-09-20 NOTE — PROGRESS NOTE ADULT - SUBJECTIVE AND OBJECTIVE BOX
Hospital course:   53M PMH of EtOH abuse and cirrhosis, anemia requiring transfusion in the past, and GERD, brought in by EMS for altered mental status. Patient's partner states that he had a mechanical fall night prior, afterwards told her not to call EMS to take him to the hospital. On day of admission, patient was less responsive than usual, so she called EMS to bring him in.  Partner states that patient developed extensive bruising to left abdomen and flank 2 to 3 days ago, prior to the fall. Pt normally drinks 1 bottle of vodka daily but has been cutting back to a few shots. Admitted to MICU for intubation i/s/o AMS and airway protection. Found to have hepatorenal syndrome and likely hepatic encephalopathy 2/2 alcohol hepatitis and decompensated liver cirrhosis, a right frontal subdural hematoma, WILLIAM with oliguria, and SIRS 2/2 aspiration PA vs SBP. On Levophed drip. Pt had large amounts of blood in mouth during intubation. Cuff was exchanged in the MICU due to leak, and Bronchoscopy was also performed which identified no acute bleed. There continued to be significant bleeding from the nares and mouth with a large clot forming outside the nares. ENT was consulted to come and pack the mouth and nose, although they could not identify a source of the bleed. 9/19 the patient has had 20cc of saleem blood drained from the ET tube and melenic stool out of the rectal tube.     INTERVAL HPI/OVERNIGHT EVENTS:   Overnight, lactate uptrending. Given 250 cc LR with repeat level of lactate 7 @ 4am. Given another 250 cc LR. Remained on levo 0.19. 400 cc stool overnight --> 13.75 L total since 09/16.     Subjective: Patient seen and examined at bedside.     PHYSICAL EXAM:  GENERAL: ill appearing, intubated, sedation weaned  HEAD:  Atraumatic   EYES: icteric sclera  ENT: nasal packing   CHEST/LUNG: on vent stable FiO2 and PEEP, lungs clear to auscultation b/l  ABDOMEN: large and distended but soft and non-tender.  NERVOUS SYSTEM:  patient sedation weaned  SKIN: diffuse jaundice throughout body    ICU Vital Signs Last 24 Hrs  T(C): 36.3 (19 Sep 2023 09:02), Max: 37.2 (19 Sep 2023 06:04)  T(F): 97.3 (19 Sep 2023 09:02), Max: 99 (19 Sep 2023 06:04)  HR: 93 (19 Sep 2023 12:00) (68 - 117)  BP: 96/55 (19 Sep 2023 12:00) (79/46 - 119/66)  BP(mean): 69 (19 Sep 2023 12:00) (58 - 89)  RR: 18 (19 Sep 2023 12:00) (8 - 34)  SpO2: 92% (19 Sep 2023 12:00) (90% - 99%)    O2 Parameters below as of 19 Sep 2023 12:00  Patient On (Oxygen Delivery Method): ventilator    O2 Concentration (%): 40      I&O's Summary    19 Sep 2023 07:01  -  20 Sep 2023 06:01  --------------------------------------------------------  IN:    Dexmedetomidine: 121 mL    Enteral Tube Flush: 200 mL    IV PiggyBack: 775 mL    Lactated Ringers Bolus: 500 mL    Norepinephrine: 584.1 mL    Octreotide: 220 mL    Propofol: 114 mL    Propofol: 103 mL  Total IN: 2617.1 mL    OUT:    Dexmedetomidine: 0 mL    Other (mL): 0 mL    Rectal Tube (mL): 400 mL  Total OUT: 400 mL    Total NET: 2217.1 mL    Mode: AC/ CMV (Assist Control/ Continuous Mandatory Ventilation)  RR (machine): 16  TV (machine): 450  FiO2: 40  PEEP: 5  ITime: 1  MAP: 9.6  PIP: 20      LABS:                        7.6    15.90 )-----------( 107      ( 19 Sep 2023 04:29 )             21.6     09-19    142  |  104  |  27<H>  ----------------------------<  113<H>  4.0   |  23  |  2.60<H>    Ca    8.2<L>      19 Sep 2023 04:29  Phos  3.5     09-19  Mg     2.2     09-19    TPro  5.4<L>  /  Alb  3.7  /  TBili  28.0<H>  /  DBili  x   /  AST  110<H>  /  ALT  47<H>  /  AlkPhos  96  09-19    PT/INR - ( 19 Sep 2023 04:29 )   PT: 28.5 sec;   INR: 2.57          PTT - ( 19 Sep 2023 04:29 )  PTT:74.2 sec  Urinalysis Basic - ( 19 Sep 2023 04:29 )    Color: x / Appearance: x / SG: x / pH: x  Gluc: 113 mg/dL / Ketone: x  / Bili: x / Urobili: x   Blood: x / Protein: x / Nitrite: x   Leuk Esterase: x / RBC: x / WBC x   Sq Epi: x / Non Sq Epi: x / Bacteria: x      CAPILLARY BLOOD GLUCOSE    POCT Blood Glucose.: 97 mg/dL (19 Sep 2023 11:33)    Consultant(s) Notes Reviewed:  [x ] YES  [ ] NO    MEDICATIONS  (STANDING):  chlorhexidine 0.12% Liquid 15 milliLiter(s) Oral Mucosa every 12 hours  chlorhexidine 2% Cloths 1 Application(s) Topical <User Schedule>  CRRT Treatment    <Continuous>  cyanocobalamin Injectable 1000 MICROGram(s) IntraMuscular every 24 hours  dexMEDEtomidine Infusion 0.2 MICROgram(s)/kG/Hr (4.4 mL/Hr) IV Continuous <Continuous>  folic acid 1 milliGRAM(s) Oral daily  heparin   Injectable 5000 Unit(s) SubCutaneous every 8 hours  lactulose Syrup 30 Gram(s) Oral every 4 hours  levETIRAcetam  IVPB 500 milliGRAM(s) IV Intermittent every 12 hours  multivitamin 1 Tablet(s) Oral daily  mupirocin 2% Nasal 1 Application(s) Both Nostrils two times a day  nafcillin  IVPB      nafcillin  IVPB 2 Gram(s) IV Intermittent every 4 hours  norepinephrine Infusion 0.05 MICROgram(s)/kG/Min (8.25 mL/Hr) IV Continuous <Continuous>  octreotide  Infusion 50 MICROgram(s)/Hr (10 mL/Hr) IV Continuous <Continuous>  oxymetazoline 0.05% Nasal Spray 1 Spray(s) Both Nostrils two times a day  pantoprazole  Injectable 40 milliGRAM(s) IV Push every 12 hours  Phoxillum Filtration BK 4 / 2.5 5000 milliLiter(s) (1300 mL/Hr) CRRT <Continuous>  piperacillin/tazobactam IVPB.. 4.5 Gram(s) IV Intermittent every 8 hours  prednisoLONE    3 mG/mL Solution (ORAPRED) 40 milliGRAM(s) Oral daily  rifAXIMin 550 milliGRAM(s) Oral two times a day    MEDICATIONS  (PRN):  sodium chloride 0.9% lock flush 10 milliLiter(s) IV Push every 1 hour PRN Pre/post blood products, medications, blood draw, and to maintain line patency      Care Discussed with Consultants/Other Providers [ x] YES  [ ] NO    RADIOLOGY & ADDITIONAL TESTS: Hospital course:   53M PMH of EtOH abuse and cirrhosis, anemia requiring transfusion in the past, and GERD, brought in by EMS for altered mental status. Patient's partner states that he had a mechanical fall night prior, afterwards told her not to call EMS to take him to the hospital. On day of admission, patient was less responsive than usual, so she called EMS to bring him in.  Partner states that patient developed extensive bruising to left abdomen and flank 2 to 3 days ago, prior to the fall. Pt normally drinks 1 bottle of vodka daily but has been cutting back to a few shots. Admitted to MICU for intubation i/s/o AMS and airway protection. Found to have hepatorenal syndrome and likely hepatic encephalopathy 2/2 alcohol hepatitis and decompensated liver cirrhosis, a right frontal subdural hematoma, WILLIAM with oliguria, and SIRS 2/2 aspiration PA vs SBP. On Levophed drip. Pt had large amounts of blood in mouth during intubation. Cuff was exchanged in the MICU due to leak, and Bronchoscopy was also performed which identified no acute bleed. There continued to be significant bleeding from the nares and mouth with a large clot forming outside the nares. ENT was consulted to come and pack the mouth and nose, although they could not identify a source of the bleed. 9/19 the patient has had 20cc of saleem blood drained from the ET tube and melenic stool out of the rectal tube. On 09/20, central line and a-line placed.     INTERVAL HPI/OVERNIGHT EVENTS:   Overnight, lactate uptrending. Given 250 cc LR with repeat level of lactate 7 @ 4am. Given another 250 cc LR. Remained on levo 0.19. 400 cc stool overnight --> 13.75 L total since 09/16.     Subjective: Patient seen and examined at bedside.     PHYSICAL EXAM:  GENERAL: ill appearing, intubated, sedation weaned  HEAD:  Atraumatic   EYES: icteric sclera  ENT: nasal packing   CHEST/LUNG: on vent stable FiO2 and PEEP, lungs clear to auscultation b/l  ABDOMEN: large and distended but soft and non-tender.  NERVOUS SYSTEM:  patient sedation weaned  SKIN: diffuse jaundice throughout body    ICU Vital Signs Last 24 Hrs  Vital Signs Last 24 Hrs  T(C): 37 (20 Sep 2023 14:22), Max: 37 (20 Sep 2023 14:22)  T(F): 98.6 (20 Sep 2023 14:22), Max: 98.6 (20 Sep 2023 14:22)  HR: 94 (20 Sep 2023 16:00) (82 - 113)  BP: 95/52 (20 Sep 2023 11:40) (81/49 - 114/57)  BP(mean): 70 (20 Sep 2023 11:40) (61 - 80)  RR: 14 (20 Sep 2023 16:00) (14 - 25)  SpO2: 95% (20 Sep 2023 16:00) (91% - 97%)    Parameters below as of 20 Sep 2023 16:00  Patient On (Oxygen Delivery Method): ventilator    O2 Concentration (%): 40      I&O's Summary    19 Sep 2023 07:01  -  20 Sep 2023 06:01  --------------------------------------------------------  IN:    Dexmedetomidine: 121 mL    Enteral Tube Flush: 200 mL    IV PiggyBack: 775 mL    Lactated Ringers Bolus: 500 mL    Norepinephrine: 584.1 mL    Octreotide: 220 mL    Propofol: 114 mL    Propofol: 103 mL  Total IN: 2617.1 mL    OUT:    Dexmedetomidine: 0 mL    Other (mL): 0 mL    Rectal Tube (mL): 400 mL  Total OUT: 400 mL    Total NET: 2217.1 mL    Mode: AC/ CMV (Assist Control/ Continuous Mandatory Ventilation)  RR (machine): 16  TV (machine): 450  FiO2: 40  PEEP: 5  ITime: 1  MAP: 9.6  PIP: 20      LABS:                                   6.8    25.70 )-----------( 101      ( 20 Sep 2023 16:39 )             19.9     09-20    137  |  102  |  24<H>  ----------------------------<  73  4.5   |  20<L>  |  1.81<H>    Ca    7.4<L>      20 Sep 2023 16:39  Phos  5.1     09-20  Mg     2.3     09-20    TPro  5.0<L>  /  Alb  3.3  /  TBili  27.0<H>  /  DBili  x   /  AST  172<H>  /  ALT  62<H>  /  AlkPhos  99  09-20    PT/INR - ( 20 Sep 2023 16:39 )   PT: 26.3 sec;   INR: 2.37          PTT - ( 20 Sep 2023 16:39 )  PTT:70.8 sec  Urinalysis Basic - ( 20 Sep 2023 16:39 )    Color: x / Appearance: x / SG: x / pH: x  Gluc: 73 mg/dL / Ketone: x  / Bili: x / Urobili: x   Blood: x / Protein: x / Nitrite: x   Leuk Esterase: x / RBC: x / WBC x   Sq Epi: x / Non Sq Epi: x / Bacteria: x            Lactate, Blood: 3.9 mmol/L (09-20 @ 16:38)  Lactate, Blood: 5.1 mmol/L (09-20 @ 12:22)  Lactate, Blood: 4.8 mmol/L (09-20 @ 08:37)      RADIOLOGY, EKG & ADDITIONAL TESTS: Reviewed.   Consultant(s) Notes Reviewed:  [x ] YES  [ ] NO    MEDICATIONS  (STANDING):  chlorhexidine 0.12% Liquid 15 milliLiter(s) Oral Mucosa every 12 hours  chlorhexidine 2% Cloths 1 Application(s) Topical <User Schedule>  CRRT Treatment    <Continuous>  cyanocobalamin Injectable 1000 MICROGram(s) IntraMuscular every 24 hours  dexMEDEtomidine Infusion 0.2 MICROgram(s)/kG/Hr (4.4 mL/Hr) IV Continuous <Continuous>  folic acid 1 milliGRAM(s) Oral daily  heparin   Injectable 5000 Unit(s) SubCutaneous every 8 hours  lactulose Syrup 30 Gram(s) Oral every 4 hours  levETIRAcetam  IVPB 500 milliGRAM(s) IV Intermittent every 12 hours  multivitamin 1 Tablet(s) Oral daily  mupirocin 2% Nasal 1 Application(s) Both Nostrils two times a day  nafcillin  IVPB      nafcillin  IVPB 2 Gram(s) IV Intermittent every 4 hours  norepinephrine Infusion 0.05 MICROgram(s)/kG/Min (8.25 mL/Hr) IV Continuous <Continuous>  octreotide  Infusion 50 MICROgram(s)/Hr (10 mL/Hr) IV Continuous <Continuous>  oxymetazoline 0.05% Nasal Spray 1 Spray(s) Both Nostrils two times a day  pantoprazole  Injectable 40 milliGRAM(s) IV Push every 12 hours  Phoxillum Filtration BK 4 / 2.5 5000 milliLiter(s) (1300 mL/Hr) CRRT <Continuous>  piperacillin/tazobactam IVPB.. 4.5 Gram(s) IV Intermittent every 8 hours  prednisoLONE    3 mG/mL Solution (ORAPRED) 40 milliGRAM(s) Oral daily  rifAXIMin 550 milliGRAM(s) Oral two times a day    MEDICATIONS  (PRN):  sodium chloride 0.9% lock flush 10 milliLiter(s) IV Push every 1 hour PRN Pre/post blood products, medications, blood draw, and to maintain line patency      Care Discussed with Consultants/Other Providers [ x] YES  [ ] NO    RADIOLOGY & ADDITIONAL TESTS:

## 2023-09-20 NOTE — PROCEDURE NOTE - ADDITIONAL PROCEDURE DETAILS
+Lung sliding  bilaterally on POCUS after procedure. Wire visualized in long and short axis of venous vessel prior to dilation. +Agitated saline visualized in RA in subxiphoid v Post procedure chest x-ray performed, showing termination of tip of catheter in satisfactory position without pneumothorax

## 2023-09-20 NOTE — PROGRESS NOTE ADULT - ASSESSMENT
54yo M with PMH of Alcohol Use Disorder, Cirrhosis, Anemia, and GERD p/w encephalopathy and found to have decompensated liver disease. Palliative consulted for complex medical decision making in the setting of critical illness.    ·	continued support for family -> patient does not appear to be recovering, will recommend de-escalation of care during next family meeting if patient remains on current disease trajectory  ·	family has already decided on DNR and no trach/PEG, they have expressed their concerns about the need for long-term HD, transplant does not seem like a realistic possibility

## 2023-09-20 NOTE — CHART NOTE - NSCHARTNOTEFT_GEN_A_CORE
Admitting Diagnosis:   Patient is a 53y old  Male who presents with a chief complaint of AMS (20 Sep 2023 13:03)      PAST MEDICAL & SURGICAL HISTORY:  Cirrhosis      History of alcohol use disorder      GERD (gastroesophageal reflux disease)    Current Nutrition Order:   Diet, NPO after Midnight:      NPO Start Date: 19-Sep-2023,   NPO Start Time: 23:59 (09-19-23 @ 17:28)  -Total NPO x6 days     PO Intake: N/A    GI Issues: Abdomen distended, +BS x4, last bowel movement 9/19    Pain: No non-verbal indicators     Skin Integrity: Warm/Dry/Intact, +2 generalized     Labs:   09-20    140  |  103  |  28<H>  ----------------------------<  90  4.7   |  19<L>  |  2.27<H>    Ca    7.4<L>      20 Sep 2023 12:22  Phos  5.1     09-20  Mg     2.3     09-20    TPro  5.1<L>  /  Alb  3.2<L>  /  TBili  27.5<H>  /  DBili  x   /  AST  140<H>  /  ALT  57<H>  /  AlkPhos  99  09-20    CAPILLARY BLOOD GLUCOSE    Medications:  MEDICATIONS  (STANDING):  chlorhexidine 0.12% Liquid 15 milliLiter(s) Oral Mucosa every 12 hours  chlorhexidine 2% Cloths 1 Application(s) Topical <User Schedule>  CRRT Treatment    <Continuous>  cyanocobalamin Injectable 1000 MICROGram(s) IntraMuscular every 24 hours  folic acid 1 milliGRAM(s) Oral daily  heparin   Injectable 5000 Unit(s) SubCutaneous every 8 hours  lactulose Syrup 30 Gram(s) Oral every 8 hours  levETIRAcetam  IVPB 500 milliGRAM(s) IV Intermittent every 12 hours  meropenem  IVPB 1000 milliGRAM(s) IV Intermittent every 8 hours  multivitamin 1 Tablet(s) Oral daily  mupirocin 2% Nasal 1 Application(s) Both Nostrils two times a day  norepinephrine Infusion 0.05 MICROgram(s)/kG/Min (4.13 mL/Hr) IV Continuous <Continuous>  octreotide  Infusion 50 MICROgram(s)/Hr (10 mL/Hr) IV Continuous <Continuous>  oxymetazoline 0.05% Nasal Spray 1 Spray(s) Both Nostrils two times a day  pantoprazole  Injectable 40 milliGRAM(s) IV Push every 12 hours  Phoxillum Filtration BK 4 / 2.5 5000 milliLiter(s) (4000 mL/Hr) CRRT <Continuous>  propofol Infusion 10 MICROgram(s)/kG/Min (5.28 mL/Hr) IV Continuous <Continuous>  rifAXIMin 550 milliGRAM(s) Oral two times a day  Tranexamic acid 2000 milliGRAM(s) 2000 milliGRAM(s) Nasal once  vasopressin Infusion 0.04 Unit(s)/Min (6 mL/Hr) IV Continuous <Continuous>    MEDICATIONS  (PRN):  sodium chloride 0.9% lock flush 10 milliLiter(s) IV Push every 1 hour PRN Pre/post blood products, medications, blood draw, and to maintain line patency    Height: 172.7 cm  Weight: 88 kg  BMI: 29.5 kg/m2   lBW: 70 kg (126%IBW)    Weight Change: No new wt since admit.   Estimated energy needs:   Estimated Energy Needs Weight (lbs)	154 lb  Estimated Energy Needs Weight (kg)	69.8 kg  Estimated Energy Needs From (farhad/kg)	27	  Estimated Energy Needs To (farhad/kg)	32	  Estimated Energy Needs Calculated From (farhad/kg)	1884	  Estimated Energy Needs Calculated To (farhad/kg)	2233	  Weight used for calculations	IBW  Estimated Protein Needs Weight (lbs)	154 lb  Estimated Protein Needs Weight (kg)	69.8 kg  Estimated Protein Needs From (g/kg)	1.8	  Estimated Protein Needs To (g/kg)	2.2  Estimated Protein Needs Calculated From (g/kg)	125.64	  Estimated Protein Needs Calculated To (g/kg)	153.56  Estimated Fluid Needs Weight (lbs)	154 lb  Estimated Fluid Needs Weight (kg)	69.8 kg  Estimated Fluid Needs From (ml/kg)	25  Estimated Fluid Needs To (ml/kg)	30  Estimated Fluid Needs Calculated From (ml/kg)	1745  Estimated Fluid Needs Calculated To (ml/kg)	2094  Other Calculations	Ideal body weight used to calculate energy needs due to pt's current body weight > 120% ideal body weight. Adjusted for critical illness, cvvhd, BMI    Subjective: 53M Hx of EtOH abuse and cirrhosis, anemia, and GERD requiring transfusion in the past, brought in by EMS for altered mental status. Intubated i/s/o AMS and airway protection. Admitted to MICU for further interventions. 9/16: Bloody OG output.     Pt care discussed in interdisciplinary care team rounds. Rx and labs reviewed. Pt intubated and sedated at time of assessment; vent to VC-AC, MAP 74, norepinephrine gtt, octreotide gtt, and propofol @15.8 ml/hr (417 calories/day). Ongoing goals of care discussions; trache/PEG not within goals of care -palliative following. Pt has been NPO x6 days at time of assessment; pt with critical condition and complex goals of care regarding nutrition plan; see recs below. Elevated lactate o/n and today (7.1 at time of assessment); recommend holding nutrition at this time until able to improve profusion. Plan for line placement today and continued management of GI bleed and hepatorenal syndrome; MAP goal 70, on norepinephrine at time of assessment. No other reports GI distress or further nutritional concerns at this time. RDN will continue to reassess, intervene, and monitor as appropriate.     Nutrition Diagnosis: Inadequate oral intake related to pt condition as evidenced by need for NPO status.     Active [ x ]  Resolved [   ]    Goal: Pt will meet 75% or more of protein/energy needs via most appropriate route for nutrition.     Recommendations:  -Hold feeds until appropriate profusion identified (lactate WNL, MAP at goal)  -Initiate nutrition as soon as medically possible if within goals of care    *If enteral nutrition support permissible, establish route and initiate Nepro     >With current propofol rate, recommend goal of 44 ml/hr with LPS x2/day from 2694-5396 to provide 792 ml tube feed, 2043 calories, 94 gProt., and 576 ml free water.     >When propofol weaned, recommend goal of 60 ml/hr with LPS x1/day from 0131-8875 to provide 1080 ml tube feed, 2044 calories, 102 gProt., and 785 ml free water. This is 23.4 nonprotein calories and 1.46 gProt. per kg ideal body weight 70 kg    *If parenteral nutrition support indicated, establish route and initiate TPN     >With current propofol rate, recommend 326g dextrose and 105g amino acids and omit SMOF lipids to provide 1945 calories and 105 gProt.     >When propofol weaned, recommend 326g dextrose and 105g amino acids with 50g SMOF lipids to provide 2028 calories and 105 gProt. with GIR of 2.56 mg/kg/min   -Align nutrition with goals of care at all times  -Monitor chemistry, GI function, and skin integrity     Risk Level: High [ x ] Moderate [   ] Low [   ]

## 2023-09-20 NOTE — CHART NOTE - NSCHARTNOTEFT_GEN_A_CORE
53 year old male with hepatic chirosis, alcoholism who presented with AMS, admitted for presumed hepatic encephalopathy, coagulapathy hepatorenal syndrome to the MICU for complex medical management.  CTH for report of fall is concerning for R extra-axial hematoma.   -Stability CTH reviewed.   -Neurosurgical intervention is not recommended at this time.   -Neurosurgery will sign off. Rest of care per primary team.    Please call [phone] for questions/concerns    D/w Dr. Manuel MD 53 year old male with hepatic chirosis, alcoholism who presented with AMS, admitted for presumed hepatic encephalopathy, coagulapathy hepatorenal syndrome to the MICU for complex medical management.  CTH for report of fall is concerning for R extra-axial hematoma.   -Stability CTH reviewed.   -Continue Keppra 500mg BID  -Neurosurgical intervention is not recommended at this time.   -Neurosurgery will sign off. Rest of care per primary team.    Please call [phone] for questions/concerns    D/w Dr. Manuel MD

## 2023-09-20 NOTE — PROCEDURE NOTE - NSICDXPROCEDURE_GEN_ALL_CORE_FT
PROCEDURES:  Insertion of central venous catheter with ultrasound guidance 20-Sep-2023 12:14:52  Belinda Nesbitt  
PROCEDURES:  Insertion of temporary dialysis catheter 15-Sep-2023 21:52:11  Marcelo Oliveira  
PROCEDURES:  Ultrasound guided venous access 15-Sep-2023 10:58:39  Allan Nath  
PROCEDURES:  Bronchoscopy, flexible 15-Sep-2023 18:39:48  Marcelo Oliveira

## 2023-09-20 NOTE — PROGRESS NOTE ADULT - SUBJECTIVE AND OBJECTIVE BOX
*** DRAFT NOTE ***    HEPATOLOGY PROGRESS NOTE    INTERVAL/SUBJECTIVE:  - Still ongoing rectal bleeding    Allergies    No Known Allergies    Intolerances      MEDICATIONS:  MEDICATIONS  (STANDING):  chlorhexidine 0.12% Liquid 15 milliLiter(s) Oral Mucosa every 12 hours  chlorhexidine 2% Cloths 1 Application(s) Topical <User Schedule>  CRRT Treatment    <Continuous>  cyanocobalamin Injectable 1000 MICROGram(s) IntraMuscular every 24 hours  folic acid 1 milliGRAM(s) Oral daily  heparin   Injectable 5000 Unit(s) SubCutaneous every 8 hours  lactulose Syrup 30 Gram(s) Oral every 8 hours  levETIRAcetam  IVPB 500 milliGRAM(s) IV Intermittent every 12 hours  multivitamin 1 Tablet(s) Oral daily  mupirocin 2% Nasal 1 Application(s) Both Nostrils two times a day  nafcillin  IVPB      nafcillin  IVPB 2 Gram(s) IV Intermittent every 4 hours  norepinephrine Infusion 0.05 MICROgram(s)/kG/Min (8.25 mL/Hr) IV Continuous <Continuous>  octreotide  Infusion 50 MICROgram(s)/Hr (10 mL/Hr) IV Continuous <Continuous>  oxymetazoline 0.05% Nasal Spray 1 Spray(s) Both Nostrils two times a day  pantoprazole  Injectable 40 milliGRAM(s) IV Push every 12 hours  Phoxillum Filtration BK 4 / 2.5 5000 milliLiter(s) (1300 mL/Hr) CRRT <Continuous>  piperacillin/tazobactam IVPB.. 4.5 Gram(s) IV Intermittent every 8 hours  propofol Infusion 10 MICROgram(s)/kG/Min (5.28 mL/Hr) IV Continuous <Continuous>  rifAXIMin 550 milliGRAM(s) Oral two times a day  Tranexamic acid 2000 milliGRAM(s) 2000 milliGRAM(s) Nasal once    MEDICATIONS  (PRN):  sodium chloride 0.9% lock flush 10 milliLiter(s) IV Push every 1 hour PRN Pre/post blood products, medications, blood draw, and to maintain line patency    Vital Signs Last 24 Hrs  T(C): 36.3 (20 Sep 2023 06:03), Max: 36.7 (19 Sep 2023 22:58)  T(F): 97.3 (20 Sep 2023 06:03), Max: 98.1 (19 Sep 2023 22:58)  HR: 100 (20 Sep 2023 08:00) (75 - 117)  BP: 85/51 (20 Sep 2023 08:00) (81/49 - 115/61)  BP(mean): 64 (20 Sep 2023 08:00) (61 - 80)  RR: 18 (20 Sep 2023 08:00) (13 - 23)  SpO2: 95% (20 Sep 2023 08:00) (91% - 97%)    Parameters below as of 20 Sep 2023 08:00  Patient On (Oxygen Delivery Method): ventilator    O2 Concentration (%): 40    09-19 @ 07:01  -  09-20 @ 07:00  --------------------------------------------------------  IN: 2740.2 mL / OUT: 800 mL / NET: 1940.2 mL    09-20 @ 07:01  -  09-20 @ 08:19  --------------------------------------------------------  IN: 46.3 mL / OUT: 0 mL / NET: 46.3 mL      PHYSICAL EXAM:  General: Alert, no acute distress  Lungs: Normal respiratory effort  Abdomen: Nondistended, soft, nontender, No rebound or guarding  Extremities: No edema  Neurological: Moving all extremities spontaneously    LABS:                        7.7    24.50 )-----------( 100      ( 20 Sep 2023 03:49 )             23.0     09-20    140  |  100  |  28<H>  ----------------------------<  78  4.5   |  19<L>  |  2.45<H>    Ca    7.6<L>      20 Sep 2023 03:49  Phos  5.1     09-20  Mg     2.3     09-20    TPro  5.1<L>  /  Alb  3.2<L>  /  TBili  27.5<H>  /  DBili  x   /  AST  140<H>  /  ALT  57<H>  /  AlkPhos  99  09-20    PT/INR - ( 20 Sep 2023 03:49 )   PT: 26.9 sec;   INR: 2.42     PTT - ( 20 Sep 2023 03:49 )  PTT:68.5 sec    RADIOLOGY & ADDITIONAL STUDIES:  Reviewed *** DRAFT NOTE ***    HEPATOLOGY PROGRESS NOTE    INTERVAL/SUBJECTIVE:  - Continues to ooze from oropharynx and continued bloody output in NG tube  - Pressor requirement increased to     Allergies  No Known Allergies    Intolerances    MEDICATIONS:  MEDICATIONS  (STANDING):  chlorhexidine 0.12% Liquid 15 milliLiter(s) Oral Mucosa every 12 hours  chlorhexidine 2% Cloths 1 Application(s) Topical <User Schedule>  CRRT Treatment    <Continuous>  cyanocobalamin Injectable 1000 MICROGram(s) IntraMuscular every 24 hours  folic acid 1 milliGRAM(s) Oral daily  heparin   Injectable 5000 Unit(s) SubCutaneous every 8 hours  lactulose Syrup 30 Gram(s) Oral every 8 hours  levETIRAcetam  IVPB 500 milliGRAM(s) IV Intermittent every 12 hours  multivitamin 1 Tablet(s) Oral daily  mupirocin 2% Nasal 1 Application(s) Both Nostrils two times a day  nafcillin  IVPB      nafcillin  IVPB 2 Gram(s) IV Intermittent every 4 hours  norepinephrine Infusion 0.05 MICROgram(s)/kG/Min (8.25 mL/Hr) IV Continuous <Continuous>  octreotide  Infusion 50 MICROgram(s)/Hr (10 mL/Hr) IV Continuous <Continuous>  oxymetazoline 0.05% Nasal Spray 1 Spray(s) Both Nostrils two times a day  pantoprazole  Injectable 40 milliGRAM(s) IV Push every 12 hours  Phoxillum Filtration BK 4 / 2.5 5000 milliLiter(s) (1300 mL/Hr) CRRT <Continuous>  piperacillin/tazobactam IVPB.. 4.5 Gram(s) IV Intermittent every 8 hours  propofol Infusion 10 MICROgram(s)/kG/Min (5.28 mL/Hr) IV Continuous <Continuous>  rifAXIMin 550 milliGRAM(s) Oral two times a day  Tranexamic acid 2000 milliGRAM(s) 2000 milliGRAM(s) Nasal once    MEDICATIONS  (PRN):  sodium chloride 0.9% lock flush 10 milliLiter(s) IV Push every 1 hour PRN Pre/post blood products, medications, blood draw, and to maintain line patency    Vital Signs Last 24 Hrs  T(C): 36.3 (20 Sep 2023 06:03), Max: 36.7 (19 Sep 2023 22:58)  T(F): 97.3 (20 Sep 2023 06:03), Max: 98.1 (19 Sep 2023 22:58)  HR: 100 (20 Sep 2023 08:00) (75 - 117)  BP: 85/51 (20 Sep 2023 08:00) (81/49 - 115/61)  BP(mean): 64 (20 Sep 2023 08:00) (61 - 80)  RR: 18 (20 Sep 2023 08:00) (13 - 23)  SpO2: 95% (20 Sep 2023 08:00) (91% - 97%)    Parameters below as of 20 Sep 2023 08:00  Patient On (Oxygen Delivery Method): ventilator    O2 Concentration (%): 40    09-19 @ 07:01  -  09-20 @ 07:00  --------------------------------------------------------  IN: 2740.2 mL / OUT: 800 mL / NET: 1940.2 mL    09-20 @ 07:01  -  09-20 @ 08:19  --------------------------------------------------------  IN: 46.3 mL / OUT: 0 mL / NET: 46.3 mL      PHYSICAL EXAM:  General: intubated, sedated  HEENT: dried and incompletely dried blood in oropharyxn  Lungs: bilateral chest rise  Abdomen: nondistended, nontender, +abdominal wall ecchymosis, +bloody output in rectal tube  Extremities: no edema    LABS:                        7.7    24.50 )-----------( 100      ( 20 Sep 2023 03:49 )             23.0     09-20    140  |  100  |  28<H>  ----------------------------<  78  4.5   |  19<L>  |  2.45<H>    Ca    7.6<L>      20 Sep 2023 03:49  Phos  5.1     09-20  Mg     2.3     09-20    TPro  5.1<L>  /  Alb  3.2<L>  /  TBili  27.5<H>  /  DBili  x   /  AST  140<H>  /  ALT  57<H>  /  AlkPhos  99  09-20    PT/INR - ( 20 Sep 2023 03:49 )   PT: 26.9 sec;   INR: 2.42     PTT - ( 20 Sep 2023 03:49 )  PTT:68.5 sec    RADIOLOGY & ADDITIONAL STUDIES:  Reviewed HEPATOLOGY PROGRESS NOTE    INTERVAL/SUBJECTIVE:  - Continues to ooze from oropharynx and continued bloody output in NG tube  - Pressor requirement increased to     Allergies  No Known Allergies    Intolerances    MEDICATIONS:  MEDICATIONS  (STANDING):  chlorhexidine 0.12% Liquid 15 milliLiter(s) Oral Mucosa every 12 hours  chlorhexidine 2% Cloths 1 Application(s) Topical <User Schedule>  CRRT Treatment    <Continuous>  cyanocobalamin Injectable 1000 MICROGram(s) IntraMuscular every 24 hours  folic acid 1 milliGRAM(s) Oral daily  heparin   Injectable 5000 Unit(s) SubCutaneous every 8 hours  lactulose Syrup 30 Gram(s) Oral every 8 hours  levETIRAcetam  IVPB 500 milliGRAM(s) IV Intermittent every 12 hours  multivitamin 1 Tablet(s) Oral daily  mupirocin 2% Nasal 1 Application(s) Both Nostrils two times a day  nafcillin  IVPB      nafcillin  IVPB 2 Gram(s) IV Intermittent every 4 hours  norepinephrine Infusion 0.05 MICROgram(s)/kG/Min (8.25 mL/Hr) IV Continuous <Continuous>  octreotide  Infusion 50 MICROgram(s)/Hr (10 mL/Hr) IV Continuous <Continuous>  oxymetazoline 0.05% Nasal Spray 1 Spray(s) Both Nostrils two times a day  pantoprazole  Injectable 40 milliGRAM(s) IV Push every 12 hours  Phoxillum Filtration BK 4 / 2.5 5000 milliLiter(s) (1300 mL/Hr) CRRT <Continuous>  piperacillin/tazobactam IVPB.. 4.5 Gram(s) IV Intermittent every 8 hours  propofol Infusion 10 MICROgram(s)/kG/Min (5.28 mL/Hr) IV Continuous <Continuous>  rifAXIMin 550 milliGRAM(s) Oral two times a day  Tranexamic acid 2000 milliGRAM(s) 2000 milliGRAM(s) Nasal once    MEDICATIONS  (PRN):  sodium chloride 0.9% lock flush 10 milliLiter(s) IV Push every 1 hour PRN Pre/post blood products, medications, blood draw, and to maintain line patency    Vital Signs Last 24 Hrs  T(C): 36.3 (20 Sep 2023 06:03), Max: 36.7 (19 Sep 2023 22:58)  T(F): 97.3 (20 Sep 2023 06:03), Max: 98.1 (19 Sep 2023 22:58)  HR: 100 (20 Sep 2023 08:00) (75 - 117)  BP: 85/51 (20 Sep 2023 08:00) (81/49 - 115/61)  BP(mean): 64 (20 Sep 2023 08:00) (61 - 80)  RR: 18 (20 Sep 2023 08:00) (13 - 23)  SpO2: 95% (20 Sep 2023 08:00) (91% - 97%)    Parameters below as of 20 Sep 2023 08:00  Patient On (Oxygen Delivery Method): ventilator    O2 Concentration (%): 40    09-19 @ 07:01  -  09-20 @ 07:00  --------------------------------------------------------  IN: 2740.2 mL / OUT: 800 mL / NET: 1940.2 mL    09-20 @ 07:01  -  09-20 @ 08:19  --------------------------------------------------------  IN: 46.3 mL / OUT: 0 mL / NET: 46.3 mL      PHYSICAL EXAM:  General: intubated, sedated  HEENT: dried and incompletely dried blood in oropharyxn  Lungs: bilateral chest rise  Abdomen: nondistended, nontender, +abdominal wall ecchymosis, +bloody output in rectal tube  Extremities: no edema    LABS:                        7.7    24.50 )-----------( 100      ( 20 Sep 2023 03:49 )             23.0     09-20    140  |  100  |  28<H>  ----------------------------<  78  4.5   |  19<L>  |  2.45<H>    Ca    7.6<L>      20 Sep 2023 03:49  Phos  5.1     09-20  Mg     2.3     09-20    TPro  5.1<L>  /  Alb  3.2<L>  /  TBili  27.5<H>  /  DBili  x   /  AST  140<H>  /  ALT  57<H>  /  AlkPhos  99  09-20    PT/INR - ( 20 Sep 2023 03:49 )   PT: 26.9 sec;   INR: 2.42     PTT - ( 20 Sep 2023 03:49 )  PTT:68.5 sec    RADIOLOGY & ADDITIONAL STUDIES:  Reviewed

## 2023-09-20 NOTE — PROCEDURAL SAFETY CHECKLIST WITH OR WITHOUT SEDATION - NSTEAMNURSEFT_GEN_ALL_CORE
[facility] Sandeep
[facility] Sandeep
CHERELLE Hamilton and CHERELLE Pop

## 2023-09-20 NOTE — CONSULT NOTE ADULT - ATTENDING COMMENTS
Patient is critically ill with rising lactic acid and now requiring pressors.  Ok to start broad spectrum antibiotics although I feel they will be futile and not change clinical course.  Can start Vancomycin 750 mg IV q12 + Meropenem 1 gram IV q8hrs (both dosed for CVVHD).  While both vanco and meropenem have MSSA activity, I would prefer more optimal coverage.  Stop Nafcillin as it is fluid overloading and is hepatically cleared.  Start Cefazolin 2 grams IV q12 for MSSA coverage (dosed for CVVHD).  If patient spikes fever, check repeat blood cultures.  Poor prognosis overall

## 2023-09-20 NOTE — PROCEDURE NOTE - NSPROCNAME_GEN_A_CORE
Point of Care Ultrasound FAST
Central Line Insertion
Point of Care Ultrasound Cardiac
Point of Care Ultrasound FAST
Bronchoscopy
Point of Care Ultrasound Cardiac
Point of Care Ultrasound FAST
Point of Care Ultrasound Lung
Peripheral Line Insertion
Point of Care Ultrasound Cardiac
Point of Care Ultrasound Lung
Point of Care Ultrasound Cardiac
Point of Care Ultrasound DVT
Point of Care Ultrasound DVT
Gastric Intubation/Gastric Lavage
Point of Care Ultrasound DVT
Point of Care Ultrasound DVT
Point of Care Ultrasound Lung
Central Line Insertion
Arterial Puncture/Cannulation

## 2023-09-20 NOTE — PROCEDURE NOTE - NSPOSTCAREGUIDE_GEN_A_CORE
Verbal/written post procedure instructions were given to patient/caregiver/Instructed patient/caregiver to follow-up with primary care physician/Instructed patient/caregiver regarding signs and symptoms of infection/Keep the cast/splint/dressing clean and dry
Verbal/written post procedure instructions were given to patient/caregiver/Instructed patient/caregiver to follow-up with primary care physician/Keep the cast/splint/dressing clean and dry/Care for catheter as per unit/ICU protocols
Care for catheter as per unit/ICU protocols

## 2023-09-20 NOTE — PROGRESS NOTE ADULT - ASSESSMENT
53M Hx of EtOH abuse and cirrhosis, anemia requiring transfusion in the past, GERD, and a recent un-witnessed fall BIBEMS for altered mental status now intubated and sedated, admitted for further management of  hepatic encephalopathy and hepatorenal syndrome 2/2 alcohol hepatitis and decompensated liver cirrhosis (MELD 41), found to have right frontal hematoma (epidural vs subdural), and possible pancreatitis. Patient DNR/Intubate    Neuro  #Hepatic encephalopathy  Intubated and sedated. Per wife AAOx3 at baseline. AMS unlikely 2/2 SAH vs SDH seen on CT. Ammonia started at 169 9/14 --> 182 9/16. No need to trend  - Patient is on sedation holiday --> Evaluate mental status while off sedation  - On rifaximin and lactulose syrup via NGT. Decrease lactulose to TID and continue rifaximin as per GI recs  - Monitor stool output.  - Continue thiamine, folic acid    #Intra-cranial bleed  CT head with extra-axial hematoma in right frontal lobe. ED at [facility] discussed with neurosurgery and suspect bleed to not be causing AMS. s/p vitamin K, Keppra 1g, and FFP.  - Repeat CT head o/n: no significant change in the small right frontal subdural hematoma. Increased opacification of the paranasal sinuses likely due to intubation.  - Neurosurgery onboard: DENNY  - c/w Heparin.  - Continue to monitor      Cardiovascular  On Levophed for Hepatorenal syndrome. Slightly increased requirements low suspicion for shock.  DENNY    Pulm  #Intubated  #Possible oropharyngeal blood  Findings of considerable blood in oropharynx and nasopharynx requiring suctioning. No additional bleeding. Mouth unpacked. Continued bleeding at the nares rocket inserted, Hgb decreased to 8.3 - 3u pRBC given thus far. ENT came to pack the nares and throat.   - Plan to wean sedation  - Plan to extubate tomorrow - ENT to unpack nose and mouth as tolerated  - Restarted propofol, fentanyl drips for ENT procedure      #Aspiration PNA  Found to be increasingly altered per wife and s/p unwitnessed fall day prior to admission. Received CTX 1g at [facility].  - Continue Zosyn 4.5g daily (7 days through 9/23)  - sputum culture positive for MSSA - start nafcillin 2g q4h     GI  #Decompensated Liver Cirrhosis  #Hepatic encephalopathy - see Neuro  #?SBP  #Alcohol Hepatitis  History of chronic alcohol use presenting with hepatic encephalopathy ammonia 159-->186 -->120 (most recently) today with distended abdomen, soft and non-tender and diffusely jaundice. Bedside POCUS with small pockets of fluid. Negative hepatitis panel, HIV. Hepatology noted that risks outweigh benefits.  - Prednisolone for severe alcohol hepatitis  - MELD score 41 today  - Maddrey score 200 today --> Radha score  - Child Ferguson score 13-15 (unable to assess mental status while sedated)  - Continue thiamine, folic acid  - Tentative plan for EGD 09/20- order 1U cryo to start during procedure  - Please avoid rectal tube or other rectal instrumentation  - F/u fibrinogen  - Would be cautious of steroids given renal failure and bleeding, and if continue would watch very closely for bleeding or infection  - Continue norepi / albumin and can add vasopressin for hepatorenal syndrome treatment, but not a candidate for terlipressin given Cr >5 and vent support        #Melenic stool  1.1L of bright red blood seen in rectal tube.   -Call GI for upper and lower scope.    #Pancreatitis   CT abdomen with some papito-pancreatic stranding. WBC decreasing to 12.8 today. Pt had no abdominal pain on arrival to ED. Tachypneic to 30s-40s upon abdominal palpation. Lipase 1586  -Merna score 0 points  - low risk for mortality, unlikely to be severe pancreatitis.  -Re-assess at 48 hours (9/16 afternoon).  -Pt has hepatorenal syndrome - cannot give maintenance fluid although clinical suspicion is low at this time.  -On fentanyl drip    #Elevated T-bili  Tbili >25, direct bili >16on admission. CTA a/p: normal biliary duct/gallbladder but has hepatic steatosis with hepatomegaly, and portal hypertension.  - T bili stable   - Trend tbili  - LDH elevated at 248, Hapto WNL at 40.    Nephro  #WILLIAM vs WILLIAM on CKD with anuria  #Hepatorenal syndrome  BUN 55, Cr 6.78 suspect hepatorenal syndrome in setting of portal hypertension with splanchnic arterial vasodilation resulting in decreased renal perfusion. CK low at 24.  - Markedly low UOP @ 25cc since admission.   - Stable BUN/Cr on CVVHD no urine in bladder on POCUS  - On Albumin 25% IVPB 100mg q6h for 3 days. Last day today 9/17  - Norepinephrine with goal MAP 65  - Nephrology onboard: recommend CVVHD.  - Strict I/Os.  - Avoid nephrotoxic med and renally dose meds.    #Hyponatremia - RESOLVED  Na 118 on admission, received 3L NS in ED. Started 3% NS @ 25cc/hr.  - Repeat BMP q4h with goal Na 122 by 6PM 9/15, goal Na 126 by 6PM 9/16  - If Na 128 or higher can give dose of DDAVP 1mcg (can give additional dose 6 hours after if needed)  - Urine Na <20  - Urine osm 290 s/p 2L NS  - Serum osm 276  - Nephro onbaord: likely hypovolemic hyponatremia.    #NAGMA - RESOLVED  Bicarb 18, increased AG 18. Pt met 2/4 SIRS criteria on admission. Had episode of loose/mucous stool. AG 14 9/18  - Trend bicarb and AG  -  Bicarb 23, AG 14 today.      Heme  #Epistaxis  No additional bleeding. Mouth unpacked. Continued bleeding at the nares rocket inserted, Hgb decreased to 7.6 - 3u pRBC given thus far. ENT came to unpack the nares and throat but had continued bleeding. Additional bleeding per rectum may also be contributing to anemia  - trend CBC to monitor Hgb    #Anemia - stable.  Hb 7.3, .5 likely in setting of alcohol use disorder. History of hemorrhoids. Has ecchymosis on flank however no active bleed and known history of anemia. Partner denies history of esophageal varices. INR 2.5. S/p 1 unit pRBC and 1 unit FFP overnight, Hb 7.6 today.  - Continue Vitamin b12, folic acid 1mg daily.  - f/u serum b12 and folate.  - Type & Screen  - Transfuse for Hb <7 or <8 with active bleed  - LDH high, haptoglobin WNL  - Iron 89, TIBC low <17, ferritin elevated 3,417.   - Coag dysfunction 2/2 liver dysfunction.  - Start Heparin AC    #Thrombocytopenia - worsening.  Platelet 115, likely 2/2 alcohol use disorder. 73 today, likely in the setting of critical illness/alcoholic cirrhosis.   - Trend platelets  - Transfuse <10, <20 with fever or <50 with bleeding    ID  #Sepsis  Met 2/4 SIRS criteria (leukocytosis, tachycardic) with suspected asp PNA vs SBP. Blood and urine cultures negative at 12 hours.  Sputum culture positive for MSSA  - f/u blood, urine cultures at 48 hours  - continue nafcillin 2g q4h (started 09/17- Day 4 today)  - continue zosyn 4.5 through 9/23    Fluids: n/a  Electrolytes: Caution in setting of suspected WILLIAM  Nutrition: NPO  DVT ppx: Heparin 5000 q8h (prothrombotic state)  GI ppx: Protonix  Code: DNR/Intubate  Dispo: MICU 53M Hx of EtOH abuse and cirrhosis, anemia requiring transfusion in the past, GERD, and a recent un-witnessed fall BIBEMS for altered mental status now intubated and sedated, admitted for further management of  hepatic encephalopathy and hepatorenal syndrome 2/2 alcohol hepatitis and decompensated liver cirrhosis (MELD 41), found to have right frontal hematoma (epidural vs subdural), and possible pancreatitis. Patient DNR/Intubate    Neuro  #Hepatic encephalopathy  Intubated and sedated. Per wife AAOx3 at baseline. AMS unlikely 2/2 SAH vs SDH seen on CT. Ammonia started at 169 9/14 --> 182 9/16. No need to trend  - On rifaximin and lactulose syrup via NGT. Decrease lactulose to TID and continue rifaximin as per GI recs  - Monitor stool output.  - Continue thiamine, folic acid    #Intra-cranial bleed  CT head with extra-axial hematoma in right frontal lobe. ED at [facility] discussed with neurosurgery and suspect bleed to not be causing AMS. s/p vitamin K, Keppra 1g, and FFP.  - Repeat CT head o/n: no significant change in the small right frontal subdural hematoma. Increased opacification of the paranasal sinuses likely due to intubation.  - Neurosurgery onboard: DENNY  -DENNY      Cardiovascular  On Levophed for Hepatorenal syndrome. Slightly increased requirements low suspicion for shock.  DENNY    Pulm  #Intubated  #Possible oropharyngeal blood  Findings of considerable blood in oropharynx and nasopharynx requiring suctioning. No additional bleeding. Mouth unpacked. Continued bleeding at the nares rocket inserted, Hgb decreased to 8.3 - 3u pRBC given thus far. ENT came to pack the nares and throat.   - Patient still on fentanyl, propofol drips  - Nasal packing in place     #Aspiration PNA  Found to be increasingly altered per wife and s/p unwitnessed fall day prior to admission. Received CTX 1g at [facility].  - Discontinue zosyn and nafcillin  - Continue meropenem  - Start cefazolin as detailed below in ID section  - Follow ID recs    GI  #Decompensated Liver Cirrhosis  #Hepatic encephalopathy - see Neuro  #?SBP  #Alcohol Hepatitis  History of chronic alcohol use presenting with hepatic encephalopathy ammonia 159-->186 -->120 (most recently) today with distended abdomen, soft and non-tender and diffusely jaundice. Bedside POCUS with small pockets of fluid. Negative hepatitis panel, HIV. Hepatology noted that risks outweigh benefits.  - Prednisolone for severe alcohol hepatitis  - MELD score 41 today  - Maddrey score 200 today --> Radha score  - Child Ferguson score 13-15 (unable to assess mental status while sedated)  - Continue thiamine, folic acid  - GI not planning to proceed with EGD at this time  - F/u fibrinogen, CBC, CMP, ABG q6h  - Would be cautious of steroids given renal failure and bleeding, and if continue would watch very closely for bleeding or infection  - Continue norepi / albumin and can add vasopressin for hepatorenal syndrome treatment, but not a candidate for terlipressin given Cr >5 and vent support    #Melenic stool  1.1L of bright red blood seen in rectal tube.   -Call GI for upper and lower scope.    #Pancreatitis   CT abdomen with some papito-pancreatic stranding. WBC decreasing to 12.8 today. Pt had no abdominal pain on arrival to ED. Tachypneic to 30s-40s upon abdominal palpation. Lipase 1586  -Merna score 0 points  - low risk for mortality, unlikely to be severe pancreatitis.  -Re-assess at 48 hours (9/16 afternoon).  -Pt has hepatorenal syndrome - cannot give maintenance fluid although clinical suspicion is low at this time.  -On fentanyl drip    #Elevated T-bili  Tbili >25, direct bili >16on admission. CTA a/p: normal biliary duct/gallbladder but has hepatic steatosis with hepatomegaly, and portal hypertension.  - T bili stable   - Trend tbili  - LDH elevated at 248, Hapto WNL at 40.    Nephro  #WILLIAM vs WILLIAM on CKD with anuria  #Hepatorenal syndrome  BUN 24/1.81 suspect hepatorenal syndrome in setting of portal hypertension with splanchnic arterial vasodilation resulting in decreased renal perfusion. CK low at 24. Received albumin 250 mL IVP.   - Markedly low UOP @ 25cc since admission.   - Stable BUN/Cr on CVVHD no urine in bladder on POCUS  - On Albumin 25% IVPB 100mg q6h for 3 days. Last day today 9/17  - Norepinephrine with goal MAP 65  - Nephrology onboard: recommend CVVHD.  - Strict I/Os.  - Avoid nephrotoxic med and renally dose meds.    #Hyponatremia   Na 118 on admission, Na 137 today  -RESOLVED    #NAGMA - RESOLVED  Bicarb 18, increased AG 18. Pt met 2/4 SIRS criteria on admission. Had episode of loose/mucous stool. AG 14 9/18  - Trend bicarb and AG  -  Bicarb 20, AG 15 today (closed)      Heme  #Epistaxis  No additional bleeding. Mouth packed by ENT 09/19. Hb decreased to 6.8 today. Additional unit blood given.   - trend CBC to monitor Hgb    #Anemia - stable.  Hb 7.3, .5 likely in setting of alcohol use disorder. History of hemorrhoids. Has ecchymosis on flank however no active bleed and known history of anemia. Partner denies history of esophageal varices. Hb 6.8 today  - Continue Vitamin b12, folic acid 1mg daily.  - f/u serum b12 and folate.  - Type & Screen  - Transfuse for Hb <7 or <8 with active bleed  - LDH high, haptoglobin WNL  - Iron 89, TIBC low <17, ferritin elevated 3,417.   - Coag dysfunction 2/2 liver dysfunction.  - Start Heparin AC      #Thrombocytopenia - worsening.  Platelet 115, likely 2/2 alcohol use disorder. 73 today, likely in the setting of critical illness/alcoholic cirrhosis.   - Trend platelets  - Transfuse <10, <20 with fever or <50 with bleeding  - F/u DIC labs- fibrinogen      ID  #Sepsis  Met 2/4 SIRS criteria (leukocytosis, tachycardic) with suspected asp PNA vs SBP. Blood and urine cultures negative at 12 hours. ID recs obtained.   Sputum culture positive for MSSA  - c/w Vanc 750 q12 IV to cover MRSA and coag neg staph due to presence of multiple access lines  - c/w Meropenem 1g q8 IV to cover pseudomonas  - switch to Cefazolin 2g q12 IV (safer in patient with both hepatic and renal impairment)  - STOP nafcillin  - STOP zosyn  - of note, the above dosing is for CRRT dosing while pt is on CVVHD  - if pt spikes another fever would then recommend collecting another Blood culture  - trend CBC  - f/u BCx  - f/u CT scans ordered by primary team    Fluids: n/a  Electrolytes: Caution in setting of suspected WILLIAM  Nutrition: NPO  DVT ppx: Heparin 5000 q8h (prothrombotic state)  GI ppx: Protonix  Code: DNR/Intubate  Dispo: MICU

## 2023-09-20 NOTE — PROCEDURE NOTE - NSPOSTPRCRAD_GEN_A_CORE
post-procedure radiography performed
central line located in the superior vena cava/no pneumothorax
no pneumothorax

## 2023-09-20 NOTE — PROCEDURAL SAFETY CHECKLIST WITH OR WITHOUT SEDATION - NSPROCEDPERFORMDFREE_GEN_ALL_CORE
Short Stay Endoscopy History and Physical    PCP - Daniel Vela MD    Procedure -   ASA & Mallampati - per anesthesia  History of Anesthesia problems - no  Family history Anesthesia problems - no     HPI:  This is a 56 y.o. male here for evaluation and removal of an [employer] transgastric drainage stent.     ROS:  Constitutional: No fevers, chills, No weight loss  ENT: No allergies  CV: No chest pain  Pulm: No shortness of breath  GI: see HPI  Derm: No rash    Medical History:  has a past medical history of Diabetes mellitus and Pancreatitis.    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    Family History: family history is not on file..     Social History:  reports that he has never smoked. He has never used smokeless tobacco. He reports that he does not drink alcohol and does not use drugs.    Review of patient's allergies indicates:  No Known Allergies    Medications:   Medications Prior to Admission   Medication Sig Dispense Refill Last Dose    dulaglutide (TRULICITY SUBQ) Inject into the skin.   Taking    empagliflozin (JARDIANCE) 25 mg tablet Take 25 mg by mouth once daily.   Taking    metFORMIN (GLUCOPHAGE) 500 MG tablet Take 500 mg by mouth 2 (two) times daily with meals.   Taking    ciprofloxacin HCl (CIPRO) 500 MG tablet Take 1 tablet (500 mg total) by mouth every 12 (twelve) hours. for 21 days (Patient not taking: Reported on 7/7/2022) 42 tablet 0 Not Taking         Objective Findings:    Vital Signs: see nursing notes  Physical Exam:  General Appearance: AAM, well appearing in no acute distress  Eyes:    No scleral icterus  ENT: Neck supple  Lungs: CTA anteriorly  Heart:  S1, S2 normal, no murmurs heard  Abdomen: Soft, 
non tender, non distended with positive bowel sounds. No hepatosplenomegaly, ascites, or mass  Extremities: no edema  Skin: No rash      Labs:  Lab Results   Component Value Date    WBC 3.9 (L) 06/21/2022    HGB 11.6 (L) 06/21/2022    HCT 36.5 (L) 06/21/2022     06/21/2022    ALT <5 06/18/2022    AST 13 06/18/2022     06/21/2022    K 3.5 06/21/2022     04/11/2012    CREATININE 0.67 (L) 06/21/2022    BUN 7.3 (L) 06/21/2022    CO2 27 06/21/2022    PSA 1.31 02/05/2018    HGBA1C 11.8 (H) 09/03/2018       I have explained the risks and benefits of endoscopy procedures to the patient including but not limited to bleeding, perforation, infection, and death.    Eric Carlson MD    
brinchoscopy
L triple lumen IJ insertion
R IJ HD cath placement

## 2023-09-20 NOTE — PROGRESS NOTE ADULT - ASSESSMENT
53M h/o EtOH cirrhosis, anemia, GERD p/w AMS (s/p intubation), found with acute renal failure, coagulopathy, and MSSA PNA.    Unclear baseline labs, prior workup, or prior history of decompensation. No acute hepatitis. No tappable pocket, no PVT. Renal failure in cirrhosis portends poor prognosis though currently with some signs of improvement with treatment of PNA.    While pt has anemia requiring transfusion, variceal bleed appears less likely given stable/decreasing pressor requirements. More likely due to ongoing oozing from nasal region / oropharynx and BRBPR likely from rectal trauma/ulcer 2/2 rectal tube.    Can further evaluate transplant candidacy if he is extubated.    Recommendations:  - Can tentatively plan for EGD tomorrow, but please order 1U cryo to start during procedure  - Please avoid rectal tube or other rectal instrumentation  - Can decrease lactulose to TID and continue rifaximin  - Would be cautious of steroids given renal failure and bleeding, and if continue would watch very closely for bleeding or infection  - Continue norepi / albumin and can add vasopressin for hepatorenal syndrome treatment, but not a candidate for terlipressin given Cr >5 and vent support    We will continue to follow. Case also discussed with Dr. Holloway. Please see attending addendum for final recommendations.    Simon (Shimon Fisher MD  Gastroenterology Fellow  Pager (M-F 7a-5p): [phone]  Pager (after hours): Please call  for on-call fellow 53M h/o EtOH cirrhosis, anemia, GERD p/w AMS (s/p intubation), found with acute renal failure, coagulopathy, and MSSA PNA.    Unclear baseline labs, prior workup, or prior history of decompensation. No acute hepatitis. No tappable pocket, no PVT. Renal failure in cirrhosis portends poor prognosis though currently with some signs of improvement with treatment of PNA.    While pt has anemia requiring transfusion, variceal bleed appears less likely given stable/decreasing pressor requirements. More likely due to ongoing oozing from nasal region / oropharynx and BRBPR likely from rectal trauma/ulcer 2/2 rectal tube. If he has non-bleeding varices treatment options would be limited as banding would cause post-banding ulcers that would likely bleed given his coagulopathy and not a candidate for beta-blockers due to his pressor requirement.     Can further evaluate transplant candidacy if he is extubated.    Recommendations:  - Given limited management options for non-bleeding varices and low suspicion of active variceal bleed will defer EGD for now, but please call GI if there is c/f significant GI bleeding  - Please avoid rectal tube or other rectal instrumentation  - Can decrease lactulose to TID and continue rifaximin  - Continue norepi / albumin and can add vasopressin for hepatorenal syndrome treatment, but not a candidate for terlipressin given Cr >5 and vent support  - We would be happy to participate in a family meeting    We will continue to follow. Please see attending addendum for final recommendations.    Simon (Shimon Fisher MD  Gastroenterology Fellow  Pager (M-F 7a-5p): [phone]  Pager (after hours): Please call  for on-call fellow 53M h/o EtOH cirrhosis, anemia, GERD p/w AMS (s/p intubation), found with acute renal failure, coagulopathy, and MSSA PNA.    Unclear baseline labs, prior workup, or prior history of decompensation. No acute hepatitis. No tappable pocket, no PVT. Renal failure in cirrhosis portends poor prognosis though currently with some signs of improvement with treatment of PNA.    While pt has anemia requiring transfusion, variceal bleed appears less likely given stable/decreasing pressor requirements. More likely due to ongoing oozing from nasal region / oropharynx and BRBPR likely from rectal trauma/ulcer 2/2 rectal tube. If he has non-bleeding varices treatment options would be limited as banding would cause post-banding ulcers that would likely bleed given his coagulopathy and not a candidate for beta-blockers due to his pressor requirement.     Can further evaluate transplant candidacy if he is extubated, however given lack of significant improvement over 6 days goals of care discussion is entirely appropriate.    Recommendations:  - Given limited management options for non-bleeding varices and low suspicion of active variceal bleed will defer EGD for now, but please call GI if there is c/f significant GI bleeding  - Please avoid rectal tube or other rectal instrumentation  - Can decrease lactulose to TID and continue rifaximin  - Continue norepi / albumin and can add vasopressin for hepatorenal syndrome treatment, but not a candidate for terlipressin given Cr >5 and vent support  - Agree with addressing goals of care. We would be happy to participate in a family meeting    We will continue to follow. Case discussed with Dr. Holloway.    Simon (Shimon Fisher MD  Gastroenterology Fellow  Pager (M-F 7a-5p): [phone]  Pager (after hours): Please call  for on-call fellow

## 2023-09-20 NOTE — PROGRESS NOTE ADULT - SUBJECTIVE AND OBJECTIVE BOX
Patient is evaluated at bedside. Intubated, tolerating CVVHD (no fluid removal), increased dialyzate flow iso worsening AGMA. Poor prognosis.     Review of Systems:  Unable to obtain.     PHYSICAL EXAM:  GENERAL: Sedated, ETT/MV, requiring pressors, critically ill.   HEENT:  icteric sclera, dry blood around his mouth.   CHEST/LUNG: Clear to auscultation bilaterally; no rales, rhonchi, wheezing or rubs.   HEART: Regular rate and rhythm, no murmurs.   ABDOMEN: distended, tympanic but soft.  EXTREMITIES: . No clubbing, cyanosis, or edema   NERVOUS SYSTEM:  RAAS -3, sedated.   SKIN: diffuse jaundice  Access: [facility] HD cath accessed, placed on 9/15.    Allergies:  No Known Allergies    Hospital Medications:   MEDICATIONS  (STANDING):  albumin human  5% IVPB 250 milliLiter(s) IV Intermittent once  chlorhexidine 0.12% Liquid 15 milliLiter(s) Oral Mucosa every 12 hours  chlorhexidine 2% Cloths 1 Application(s) Topical <User Schedule>  CRRT Treatment    <Continuous>  cyanocobalamin Injectable 1000 MICROGram(s) IntraMuscular every 24 hours  folic acid 1 milliGRAM(s) Oral daily  heparin   Injectable 5000 Unit(s) SubCutaneous every 8 hours  lactulose Syrup 30 Gram(s) Oral every 8 hours  levETIRAcetam  IVPB 500 milliGRAM(s) IV Intermittent every 12 hours  meropenem  IVPB 1000 milliGRAM(s) IV Intermittent every 8 hours  multivitamin 1 Tablet(s) Oral daily  mupirocin 2% Nasal 1 Application(s) Both Nostrils two times a day  norepinephrine Infusion 0.05 MICROgram(s)/kG/Min (4.13 mL/Hr) IV Continuous <Continuous>  octreotide  Infusion 50 MICROgram(s)/Hr (10 mL/Hr) IV Continuous <Continuous>  oxymetazoline 0.05% Nasal Spray 1 Spray(s) Both Nostrils two times a day  pantoprazole  Injectable 40 milliGRAM(s) IV Push every 12 hours  Phoxillum Filtration BK 4 / 2.5 5000 milliLiter(s) (4000 mL/Hr) CRRT <Continuous>  propofol Infusion 10 MICROgram(s)/kG/Min (5.28 mL/Hr) IV Continuous <Continuous>  rifAXIMin 550 milliGRAM(s) Oral two times a day  Tranexamic acid 2000 milliGRAM(s) 2000 milliGRAM(s) Nasal once  vasopressin Infusion 0.04 Unit(s)/Min (6 mL/Hr) IV Continuous <Continuous>    VITALS:  T(F): 98.6 (09-20-23 @ 14:22), Max: 98.6 (09-20-23 @ 14:22)  HR: 95 (09-20-23 @ 15:00)  BP: 95/52 (09-20-23 @ 11:40)  RR: 14 (09-20-23 @ 15:00)  SpO2: 95% (09-20-23 @ 15:00)  Wt(kg): --    09-18 @ 07:01  -  09-19 @ 07:00  --------------------------------------------------------  IN: 2792.4 mL / OUT: 4146 mL / NET: -1353.6 mL    09-19 @ 07:01  -  09-20 @ 07:00  --------------------------------------------------------  IN: 2740.2 mL / OUT: 800 mL / NET: 1940.2 mL    09-20 @ 07:01  -  09-20 @ 15:01  --------------------------------------------------------  IN: 1341.3 mL / OUT: 0 mL / NET: 1341.3 mL      LABS:  09-20    140  |  103  |  28<H>  ----------------------------<  90  4.7   |  19<L>  |  2.27<H>    Ca    7.4<L>      20 Sep 2023 12:22  Phos  5.1     09-20  Mg     2.3     09-20    TPro  5.1<L>  /  Alb  3.2<L>  /  TBili  27.5<H>  /  DBili      /  AST  140<H>  /  ALT  57<H>  /  AlkPhos  99  09-20                          7.1    27.64 )-----------( 102      ( 20 Sep 2023 12:43 )             20.9       Urine Studies:  Creatinine Trend: 2.27<--, 2.45<--, 2.60<--, 3.09<--, 3.23<--, 3.56<--  Urinalysis Basic - ( 20 Sep 2023 12:22 )

## 2023-09-20 NOTE — CONSULT NOTE ADULT - ASSESSMENT
53M Hx of EtOH abuse and cirrhosis, anemia requiring transfusion in the past, GERD, and a recent un-witnessed fall BIBEMS for altered mental status now intubated and sedated, admitted for further management of  hepatic encephalopathy and hepatorenal syndrome 2/2 alcohol hepatitis and decompensated liver cirrhosis (MELD 41), found to have right frontal hematoma (epidural vs subdural), and possible pancreatitis. Went into septic shock from possible intraabdominal source. ID consulted for antibiotic recommendation.      #septic shock  Pt initially with MSSA in sputum and was being treated with Nafcillin. Went into septic shock and was broadened with Vancomycin and Meropenem. Pending CT a/p to further workup intraabdominal pathology.  Pt is clinically deteriorating with multiorgan failure, rising lactate and leukocytosis. Recommend continuing broad therapy as below  - c/w Vanc 750 q12 IV to cover MRSA and coag neg staph due to prescence of mulitple access lines  - c/w Meropenem 1g q8 IV to cover pseudomonas  - STOP nafcillin  - switch to Cefazolin 2g q12 IV  - of note, the above dosing is for CRRT dosing while pt is on CVVHD  - if pt spikes another fever would then recommend collecting another Blood culture  - trend CBC  - f/u BCx    Discussed with Attending. Not final until signed by Attending. 53M Hx of EtOH abuse and cirrhosis, anemia requiring transfusion in the past, GERD, and a recent un-witnessed fall BIBEMS for altered mental status now intubated and sedated, admitted for further management of  hepatic encephalopathy and hepatorenal syndrome 2/2 alcohol hepatitis and decompensated liver cirrhosis (MELD 41), found to have right frontal hematoma (epidural vs subdural), and possible pancreatitis. Went into septic shock from possible intraabdominal source. ID consulted for antibiotic recommendation.      #septic shock  Pt initially with MSSA in sputum and was being treated with Nafcillin. Went into septic shock and was broadened with Vancomycin and Meropenem. Pending CT a/p to further workup intraabdominal pathology.  Pt is clinically deteriorating with multiorgan failure, rising lactate and leukocytosis. Recommend continuing broad therapy as below  - c/w Vanc 750 q12 IV to cover MRSA and coag neg staph due to prescence of mulitple access lines  - c/w Meropenem 1g q8 IV to cover pseudomonas  - STOP nafcillin  - switch to Cefazolin 2g q12 IV (safer in patient with both hepatic and renal impairment)  - of note, the above dosing is for CRRT dosing while pt is on CVVHD  - if pt spikes another fever would then recommend collecting another Blood culture  - trend CBC  - f/u BCx  - f/u CT scans ordered by primary team      Discussed with Attending. Not final until signed by Attending.

## 2023-09-20 NOTE — PROCEDURE NOTE - PROCEDURE DATE TIME, MLM
15-Sep-2023 16:33
15-Sep-2023 10:58
15-Sep-2023 14:00
20-Sep-2023 11:52
15-Sep-2023 12:20
20-Sep-2023 10:45

## 2023-09-20 NOTE — PROGRESS NOTE ADULT - SUBJECTIVE AND OBJECTIVE BOX
[facility]  Domingo Bennett, Palliative Care Attending  Contact Info: Call [phone] (HEAL Line) or message on Microsoft Teams (Domingo Bennett)    SUBJECTIVE AND OBJECTIVE:  INTERVAL HPI/OVERNIGHT EVENTS: Interval events noted. Restarted on sedation. Continued bleeding, EGD being deferred. Unable to participate in interview. See patient's PRN use for the past 24hrs noted below. Comprehensive symptom assessment and GOC exploration as noted below. Extensive time spent discussing plan of care with family. No unexpected adverse effects of opiates noted.    ALLERGIES:  No Known Allergies    MEDICATIONS  (STANDING):  chlorhexidine 0.12% Liquid 15 milliLiter(s) Oral Mucosa every 12 hours  chlorhexidine 2% Cloths 1 Application(s) Topical <User Schedule>  CRRT Treatment    <Continuous>  cyanocobalamin Injectable 1000 MICROGram(s) IntraMuscular every 24 hours  folic acid 1 milliGRAM(s) Oral daily  heparin   Injectable 5000 Unit(s) SubCutaneous every 8 hours  lactulose Syrup 30 Gram(s) Oral every 8 hours  levETIRAcetam  IVPB 500 milliGRAM(s) IV Intermittent every 12 hours  meropenem  IVPB 1000 milliGRAM(s) IV Intermittent every 8 hours  multivitamin 1 Tablet(s) Oral daily  mupirocin 2% Nasal 1 Application(s) Both Nostrils two times a day  norepinephrine Infusion 0.05 MICROgram(s)/kG/Min (4.13 mL/Hr) IV Continuous <Continuous>  octreotide  Infusion 50 MICROgram(s)/Hr (10 mL/Hr) IV Continuous <Continuous>  oxymetazoline 0.05% Nasal Spray 1 Spray(s) Both Nostrils two times a day  pantoprazole  Injectable 40 milliGRAM(s) IV Push every 12 hours  Phoxillum Filtration BK 4 / 2.5 5000 milliLiter(s) (4000 mL/Hr) CRRT <Continuous>  propofol Infusion 10 MICROgram(s)/kG/Min (5.28 mL/Hr) IV Continuous <Continuous>  rifAXIMin 550 milliGRAM(s) Oral two times a day  Tranexamic acid 2000 milliGRAM(s) 2000 milliGRAM(s) Nasal once  vasopressin Infusion 0.04 Unit(s)/Min (6 mL/Hr) IV Continuous <Continuous>    MEDICATIONS  (PRN):  sodium chloride 0.9% lock flush 10 milliLiter(s) IV Push every 1 hour PRN Pre/post blood products, medications, blood draw, and to maintain line patency    Analgesic Use (Scheduled and PRNs) for past 24 hours:  fentaNYL    Injectable   100 MICROGram(s) IV Push (09-19-23 @ 18:29)  levETIRAcetam  IVPB   400 mL/Hr IV Intermittent (09-20-23 @ 05:21)   400 mL/Hr IV Intermittent (09-19-23 @ 19:06)  propofol Infusion   5.28 mL/Hr IV Continuous (09-19-23 @ 17:02)   5.28 mL/Hr IV Continuous (09-20-23 @ 03:15)   5.28 mL/Hr IV Continuous (09-19-23 @ 20:06)   5.28 mL/Hr IV Continuous (09-19-23 @ 18:51)    ITEMS UNCHECKED ARE NOT PRESENT  PRESENT SYMPTOMS/REVIEW OF SYSTEMS: [x]Unable to obtain due to poor mentation   Source if other than patient:  []Family   []Team     Pain: [x] yes [] no  QOL impact -  Location -  Aggravating factors -  Quality -  Radiation -  Timing -  Severity (0-10 scale) -  Minimal acceptable level (0-10 scale) -    PAINAD Score: 0  CPOT Score: 0    Dyspnea:                           []Mild  []Moderate []Severe  Anxiety:                             []Mild []Moderate []Severe  Fatigue:                             []Mild []Moderate []Severe  Nausea:                             []Mild []Moderate []Severe  Loss of appetite:              []Mild []Moderate []Severe  Constipation:                    []Mild []Moderate []Severe    Other Symptoms:  [x]All other review of systems negative     Palliative Performance Status Version 2: 10%    GENERAL:  [] NAD []Alert [x]Lethargic  []Cachexia  [x]Unarousable  []Verbal  [x]Non-Verbal  BEHAVIORAL:   []Anxiety  [x]Delirium []Agitation []Cooperative []Oriented x  HEENT:  []Normal  [] Moist Mucous Membranes []Dry mouth   [x]ET Tube/Trach  []Oral lesions  PULMONARY:   []Clear []Tachypnea  []Audible excessive secretions  [x]Normal Work of Breathing []Labored Breathing  [x]Rhonchi []Crackles []Wheezing  CARDIOVASCULAR:    [x]Regular Rate [x]Regular Rhythm []Irregular []Tachy  []Be  GASTROINTESTINAL:  [x]Soft  [x]Distended   [x]+BS  [x]Non tender []Tender  []PEG [x]OGT/ NGT  Last BM:  GENITOURINARY:  []Normal [] Incontinent   []Oliguria/Anuria   [x]Duron  MUSCULOSKELETAL:   []Normal Extremities  [x]Weakness  [x]Bed/Wheelchair bound []Edema  NEUROLOGIC:   []No focal deficits  []Cognitive impairment  [x]Dysphagia []Dysarthria []Paresis [x]Encephalopathic  SKIN:   [x]Normal   []Pressure ulcer(s)  []Rash    CRITICAL CARE:  [x]Shock Present  [ ]Septic [ ]Cardiogenic [ ]Neurologic [ ]Hypovolemic  [x] Vasopressors [ ]Inotropes   [x]Respiratory failure present [x]Mechanical Ventilation [ ]Non-invasive ventilatory support [ ]High-Flow  [x]Acute  [ ]Chronic [x]Hypoxic  [ ]Hypercarbic   [x]Other organ failure: Renal    Vital Signs Last 24 Hrs  T(C): 36.3 (20 Sep 2023 09:08), Max: 36.7 (19 Sep 2023 22:58)  T(F): 97.3 (20 Sep 2023 09:08), Max: 98.1 (19 Sep 2023 22:58)  HR: 90 (20 Sep 2023 11:00) (75 - 113)  BP: 106/59 (20 Sep 2023 11:00) (81/49 - 114/57)  BP(mean): 77 (20 Sep 2023 11:00) (61 - 80)  RR: 22 (20 Sep 2023 11:00) (15 - 23)  SpO2: 93% (20 Sep 2023 11:00) (91% - 97%)    Parameters below as of 20 Sep 2023 11:00  Patient On (Oxygen Delivery Method): ventilator  O2 Concentration (%): 40    LABS: Personally reviewed and interpreted                      7.7    24.50 )-----------( 100      ( 20 Sep 2023 03:49 )             23.0     140  |  100  |  28<H>  ----------------------------<  78  4.5   |  19<L>  |  2.45<H>    Ca    7.6<L>      20 Sep 2023 03:49  Phos  5.1     09-20  Mg     2.3     09-20  TPro  5.1<L>  /  Alb  3.2<L>  /  TBili  27.5<H>  /  DBili  x   /  AST  140<H>  /  ALT  57<H>  /  AlkPhos  99  09-20    RADIOLOGY & ADDITIONAL STUDIES: None new    DISCUSSION OF CASE: Family - to provide updates and emotional support; Primary Team/RN - to discuss plan of care

## 2023-09-20 NOTE — PROGRESS NOTE ADULT - ASSESSMENT
53Y M now with anuric WILLIAM likley 2/2 HRS now requiring CVVHD for clearance , with  signs of recovery, continue RRT for clearance with no fluid removal.    Last 24h volume balance: IN: 2740.2 mL / OUT: 800 mL / NET: 1940.2 mL  Last 24h UOP: 0    Tolerating CVVHD with the following prescription:  CRRT Treatment:   Modality: CVVHD, Filter: NxStage CAR-505, Target Blood Flow: 300 mL/Min  Target Fluid Balance: No fluid removal (09-20-23 @ 10:07) [Active]  Phoxillum Filtration BK 4 / 2.5: Solution, 5000 milliLiter(s) infuse at 4000 mL/Hr; infuse through CRRT Circuit  Administration Instructions: Continuous Renal Replacement Therapy (CRRT)  Special Instructions: Dialysate (09-20-23 @ 10:07) [Active]    Anuric ATN iso ?HRS/shock, on CVVHD:   Cont. CVVHD (no fluid removal) will perform daily assessments of blood chemistry and volume status. Noted worsening AGMA, dialyzate flow increased to 4000ml/hr.  Remains anuric, requiring vasopressors. Poor prognosis.   Avoid hypotension, maintain SBP>120 as possible.   Daily weights.   Daily labs while on CVVHD,, P 5.1 today, check P levels daily.   Strict I&Os. If Flakita rajput please perform daily bladder scans.   Currently on Meropenem 1g q8h, renally appropriated while on CVVHD and increased dialyzate flow.   Renal diet.

## 2023-09-21 NOTE — PROGRESS NOTE ADULT - ASSESSMENT
53M Hx of EtOH abuse and cirrhosis, anemia requiring transfusion in the past, GERD, and a recent un-witnessed fall BIBEMS for altered mental status now intubated and sedated, admitted for further management of  hepatic encephalopathy and hepatorenal syndrome 2/2 alcohol hepatitis and decompensated liver cirrhosis (MELD 41), found to have right frontal hematoma (epidural vs subdural), and possible pancreatitis. Patient DNR/Intubate    Neuro  #Hepatic encephalopathy  Intubated and sedated. Per wife AAOx3 at baseline. AMS unlikely 2/2 SAH vs SDH seen on CT. Ammonia started at 169 9/14 --> 182 9/16. No need to trend  - On rifaximin and lactulose syrup via NGT. Decrease lactulose to TID and continue rifaximin as per GI recs  - Monitor stool output.  - Continue thiamine, folic acid    #Intra-cranial bleed  CT head with extra-axial hematoma in right frontal lobe. ED at [facility] discussed with neurosurgery and suspect bleed to not be causing AMS. s/p vitamin K, Keppra 1g, and FFP.  - Repeat CT head o/n: no significant change in the small right frontal subdural hematoma. Increased opacification of the paranasal sinuses likely due to intubation.  - Neurosurgery onboard: DENNY  -DENNY      Cardiovascular  On Levophed for Hepatorenal syndrome. Slightly increased requirements low suspicion for shock.  DENNY    Pulm  #Intubated  #Possible oropharyngeal blood  Findings of considerable blood in oropharynx and nasopharynx requiring suctioning. No additional bleeding. Mouth unpacked. Continued bleeding at the nares rocket inserted, Hgb decreased to 8.3 - 3u pRBC given thus far. ENT came to pack the nares and throat.   - Patient still on fentanyl, propofol drips  - Nasal packing in place     #Aspiration PNA  Found to be increasingly altered per wife and s/p unwitnessed fall day prior to admission. Received CTX 1g at [facility].  - Discontinue zosyn and nafcillin  - Continue meropenem  - Start cefazolin as detailed below in ID section  - Follow ID recs    GI  #Decompensated Liver Cirrhosis  #Hepatic encephalopathy - see Neuro  #?SBP  #Alcohol Hepatitis  History of chronic alcohol use presenting with hepatic encephalopathy ammonia 159-->186 -->120 (most recently) today with distended abdomen, soft and non-tender and diffusely jaundice. Bedside POCUS with small pockets of fluid. Negative hepatitis panel, HIV. Hepatology noted that risks outweigh benefits.  - Prednisolone for severe alcohol hepatitis  - MELD score 41 today  - Maddrey score 200 today --> Radha score  - Child Ferguson score 13-15 (unable to assess mental status while sedated)  - Continue thiamine, folic acid  - GI not planning to proceed with EGD at this time  - F/u fibrinogen, CBC, CMP, ABG q6h  - Would be cautious of steroids given renal failure and bleeding, and if continue would watch very closely for bleeding or infection  - Continue norepi / albumin and can add vasopressin for hepatorenal syndrome treatment, but not a candidate for terlipressin given Cr >5 and vent support    #Melenic stool  1.1L of bright red blood seen in rectal tube.   -Call GI for upper and lower scope.    #Pancreatitis   CT abdomen with some papito-pancreatic stranding. WBC decreasing to 12.8 today. Pt had no abdominal pain on arrival to ED. Tachypneic to 30s-40s upon abdominal palpation. Lipase 1586  -Merna score 0 points  - low risk for mortality, unlikely to be severe pancreatitis.  -Re-assess at 48 hours (9/16 afternoon).  -Pt has hepatorenal syndrome - cannot give maintenance fluid although clinical suspicion is low at this time.  -On fentanyl drip    #Elevated T-bili  Tbili >25, direct bili >16on admission. CTA a/p: normal biliary duct/gallbladder but has hepatic steatosis with hepatomegaly, and portal hypertension.  - T bili stable   - Trend tbili  - LDH elevated at 248, Hapto WNL at 40.    Nephro  #WILLIAM vs WILLIAM on CKD with anuria  #Hepatorenal syndrome  BUN 24/1.81 suspect hepatorenal syndrome in setting of portal hypertension with splanchnic arterial vasodilation resulting in decreased renal perfusion. CK low at 24. Received albumin 250 mL IVP.   - Markedly low UOP @ 25cc since admission.   - Stable BUN/Cr on CVVHD no urine in bladder on POCUS  - On Albumin 25% IVPB 100mg q6h for 3 days. Last day today 9/17  - Norepinephrine with goal MAP 65  - Nephrology onboard: recommend CVVHD.  - Strict I/Os.  - Avoid nephrotoxic med and renally dose meds.    #Hyponatremia   Na 118 on admission, Na 137 today  -RESOLVED    #NAGMA - RESOLVED  Bicarb 18, increased AG 18. Pt met 2/4 SIRS criteria on admission. Had episode of loose/mucous stool. AG 14 9/18  - Trend bicarb and AG  -  Bicarb 20, AG 15 today (closed)      Heme  #Epistaxis  No additional bleeding. Mouth packed by ENT 09/19. Hb decreased to 6.8 today. Additional unit blood given.   - trend CBC to monitor Hgb    #Anemia - stable.  Hb 7.3, .5 likely in setting of alcohol use disorder. History of hemorrhoids. Has ecchymosis on flank however no active bleed and known history of anemia. Partner denies history of esophageal varices. Hb 6.8 today  - Continue Vitamin b12, folic acid 1mg daily.  - f/u serum b12 and folate.  - Type & Screen  - Transfuse for Hb <7 or <8 with active bleed  - LDH high, haptoglobin WNL  - Iron 89, TIBC low <17, ferritin elevated 3,417.   - Coag dysfunction 2/2 liver dysfunction.  - Start Heparin AC      #Thrombocytopenia - worsening.  Platelet 115, likely 2/2 alcohol use disorder. 73 today, likely in the setting of critical illness/alcoholic cirrhosis.   - Trend platelets  - Transfuse <10, <20 with fever or <50 with bleeding  - F/u DIC labs- fibrinogen      ID  #Sepsis  Met 2/4 SIRS criteria (leukocytosis, tachycardic) with suspected asp PNA vs SBP. Blood and urine cultures negative at 12 hours. ID recs obtained.   Sputum culture positive for MSSA  - c/w Vanc 750 q12 IV to cover MRSA and coag neg staph due to presence of multiple access lines  - c/w Meropenem 1g q8 IV to cover pseudomonas  - switch to Cefazolin 2g q12 IV (safer in patient with both hepatic and renal impairment)  - STOP nafcillin  - STOP zosyn  - of note, the above dosing is for CRRT dosing while pt is on CVVHD  - if pt spikes another fever would then recommend collecting another Blood culture  - trend CBC  - f/u BCx  - f/u CT scans ordered by primary team    Fluids: n/a  Electrolytes: Caution in setting of suspected WILLIAM  Nutrition: NPO  DVT ppx: Heparin 5000 q8h (prothrombotic state)  GI ppx: Protonix  Code: DNR/Intubate  Dispo: MICU   53M Hx of EtOH abuse and cirrhosis, anemia requiring transfusion in the past, GERD, and a recent un-witnessed fall BIBEMS for altered mental status now intubated and sedated, admitted for further management of  hepatic encephalopathy and hepatorenal syndrome 2/2 alcohol hepatitis and decompensated liver cirrhosis (MELD 41), found to have right frontal hematoma (epidural vs subdural), and possible pancreatitis. Patient DNR/Intubate    Neuro  #Hepatic encephalopathy  Intubated and sedated. Per wife AAOx3 at baseline. AMS unlikely 2/2 SAH vs SDH seen on CT. Ammonia started at 169 9/14 --> 182 9/16. No need to trend  - On rifaximin and lactulose syrup via NGT. Continue with lactulose q6h and continue rifaximin as per GI recs  - Monitor stool output.  - Continue thiamine, folic acid    #Intra-cranial bleed  CT head with extra-axial hematoma in right frontal lobe. ED at [facility] discussed with neurosurgery and suspect bleed to not be causing AMS. s/p vitamin K, Keppra 1g, and FFP.  - Repeat CT head o/n: no significant change in the small right frontal subdural hematoma. Increased opacification of the paranasal sinuses likely due to intubation.  - Neurosurgery onboard: DENNY  -DENNY    #Abnormal pupillary response  On exam, left pupil slightly more dilated than right. CT head ordered.     -F/u CT head      Cardiovascular  On Levophed for Hepatorenal syndrome. Slightly increased requirements low suspicion for shock.  DENNY    Pulm  #Intubated  #Possible oropharyngeal blood  Findings of considerable blood in oropharynx and nasopharynx requiring suctioning. No additional bleeding. Mouth unpacked. Continued bleeding at the nares rocket inserted, Hgb decreased to 8.3 - 3u pRBC given thus far. ENT came to pack the nares and throat on 09/19.   - Patient still on fentanyl, propofol drips  - Nasal packing in place   - Follow up with ENT to check packing    #Aspiration PNA  Found to be increasingly altered per wife and s/p unwitnessed fall day prior to admission. Received CTX 1g at [facility]. Zosyn and nafcillin discontinued.   - Continue meropenem  - Start cefazolin as detailed below in ID section  - Follow ID recs    GI  #Decompensated Liver Cirrhosis  #Hepatic encephalopathy - see Neuro  #?SBP  #Alcohol Hepatitis  History of chronic alcohol use presenting with hepatic encephalopathy ammonia 159-->186 -->120 (most recently) today with distended abdomen, soft and non-tender and diffusely jaundice. Bedside POCUS with small pockets of fluid. Negative hepatitis panel, HIV. Hepatology noted that risks outweigh benefits.  - Prednisolone for severe alcohol hepatitis  - MELD score 41 today  - Maddrey score 200 today --> Radha score  - Child Ferguson score 13-15 (unable to assess mental status while sedated)  - Continue thiamine, folic acid  - GI not planning to proceed with EGD at this time  - F/u fibrinogen, CBC, CMP, ABG q6h  - Would be cautious of steroids given renal failure and bleeding, and if continue would watch very closely for bleeding or infection  - Continue norepi / albumin and can add vasopressin for hepatorenal syndrome treatment, but not a candidate for terlipressin given Cr >5 and vent support    #Melenic stool  1.1L of bright red blood seen in rectal tube.   -Call GI for upper and lower scope.    #Pancreatitis   CT abdomen with some papito-pancreatic stranding. WBC decreasing to 12.8 today. Pt had no abdominal pain on arrival to ED. Tachypneic to 30s-40s upon abdominal palpation. Lipase 1586  -Findlay score 0 points  - low risk for mortality, unlikely to be severe pancreatitis.  -Re-assess at 48 hours (9/16 afternoon).  -Pt has hepatorenal syndrome - cannot give maintenance fluid although clinical suspicion is low at this time.  -On fentanyl drip    #Elevated T-bili  Tbili >25, direct bili >16on admission. CTA a/p: normal biliary duct/gallbladder but has hepatic steatosis with hepatomegaly, and portal hypertension.  - T bili stable   - Trend tbili  - LDH elevated at 248, Hapto WNL at 40.    Nephro  #WILLIAM vs WILLIAM on CKD with anuria  #Hepatorenal syndrome  BUN 24/1.81 suspect hepatorenal syndrome in setting of portal hypertension with splanchnic arterial vasodilation resulting in decreased renal perfusion. CK low at 24. Received albumin 250 mL IVP.   - Markedly low UOP @ 25cc since admission.   - Stable BUN/Cr on CVVHD no urine in bladder on POCUS  - On Albumin 25% IVPB 100mg q6h for 3 days. Last day today 9/17  - Norepinephrine with goal MAP 65  - Nephrology onboard: recommend CVVHD.  - Strict I/Os.  - Avoid nephrotoxic med and renally dose meds.    #Hyponatremia   Na 118 on admission, Na 137 today  -RESOLVED    #NAGMA - RESOLVED  Bicarb 18, increased AG 18. Pt met 2/4 SIRS criteria on admission. Had episode of loose/mucous stool.  - Trend bicarb and AG  -  Bicarb 19 CO2 30, pH 7.4      Heme  #Epistaxis  No additional bleeding. Mouth packed by ENT 09/19. Hb decreased to 6.8 today. Additional unit blood given.   - trend CBC to monitor Hgb    #Anemia - stable.  Hb 7.3, .5 likely in setting of alcohol use disorder. History of hemorrhoids. Has ecchymosis on flank however no active bleed and known history of anemia. Partner denies history of esophageal varices. Hb 6.8 today  - Continue Vitamin b12, folic acid 1mg daily.  - f/u serum b12 and folate.  - Type & Screen  - Transfuse for Hb <7 or <8 with active bleed  - LDH high, haptoglobin WNL  - Iron 89, TIBC low <17, ferritin elevated 3,417.   - Coag dysfunction 2/2 liver dysfunction.      #Thrombocytopenia - worsening.  Platelet 115, likely 2/2 alcohol use disorder. 73 today, likely in the setting of critical illness/alcoholic cirrhosis.   - Trend platelets  - Transfuse <10, <20 with fever or <50 with bleeding  - F/u DIC labs- fibrinogen      ID  #Sepsis  Met 2/4 SIRS criteria (leukocytosis, tachycardic) with suspected asp PNA vs SBP. Blood and urine cultures negative at 12 hours. ID recs obtained.   Sputum culture positive for MSSA  - c/w Vanc 750 q12 IV to cover MRSA and coag neg staph due to presence of multiple access lines  - c/w Meropenem 1g q8 IV to cover pseudomonas  - c/w Cefazolin 2g q12 IV (safer in patient with both hepatic and renal impairment)  - of note, the above dosing is for CRRT dosing while pt is on CVVHD- f/u renal recs  - if pt spikes another fever would then recommend collecting another Blood culture  - trend CBC  - f/u BCx  - f/u CT scans ordered by primary team    Fluids: n/a  Electrolytes: Caution in setting of suspected WILLIAM  Nutrition: NPO  DVT ppx: Heparin 5000 q8h (prothrombotic state)  GI ppx: Protonix  Code: DNR/Intubate  Dispo: MICU   53M Hx of EtOH abuse and cirrhosis, anemia requiring transfusion in the past, GERD, and a recent un-witnessed fall BIBEMS for altered mental status now intubated and sedated, admitted for further management of  hepatic encephalopathy and hepatorenal syndrome 2/2 alcohol hepatitis and decompensated liver cirrhosis (MELD 41), found to have right frontal hematoma (epidural vs subdural), and possible pancreatitis. Patient DNR/Intubate    Neuro  #Hepatic encephalopathy  Intubated and sedated. Per wife AAOx3 at baseline. AMS unlikely 2/2 SAH vs SDH seen on CT. Ammonia started at 169 9/14 --> 182 9/16. No need to trend  - On rifaximin and lactulose syrup via NGT. Continue with lactulose q6h and continue rifaximin as per GI recs  - Monitor stool output.  - Continue thiamine, folic acid    #Intra-cranial bleed  CT head with extra-axial hematoma in right frontal lobe. ED at [facility] discussed with neurosurgery and suspect bleed to not be causing AMS. s/p vitamin K, Keppra 1g, and FFP.  - Repeat CT head o/n: no significant change in the small right frontal subdural hematoma. Increased opacification of the paranasal sinuses likely due to intubation.  - Neurosurgery onboard: DENNY  -DENNY    #Anisocoria  On exam, left pupil slightly more dilated than right. Stroke code called for urgent evaluation and CT head performed.   CTA results revealing no arterial steno-occlusive disease, no carotid or vertebral artery significant stenosis or   evidence of dissection. Mixed density plaque at carotid bifurcations. CT brain perfusion revealing no perfusion defects. CT brain with bilateral frontoparietal subdural hemorrhages redemonstrated, with slight increase in hyperdense blood at right frontal convexity since 09/15/2023 and otherwise mild increase in size at places allowing for some redistribution effect. No midline shift or hydrocephalus. No parenchymal hemorrhage or recent transcortical infarct apparent.Does not correlate with pupillary response changes.   - No acute intervention at this time             Cardiovascular  #Hepatorenal syndrome  On Levophed for Hepatorenal syndrome. Slightly increased requirements low suspicion for shock.  DENNY    Pulm  #Intubated  #Possible oropharyngeal blood  Findings of considerable blood in oropharynx and nasopharynx requiring suctioning. No additional bleeding. Mouth unpacked. Continued bleeding at the nares rocket inserted, Hgb decreased to 8.3 - 3u pRBC given thus far. ENT came to pack the nares and throat on 09/19.   - Patient still on fentanyl, propofol drips  - Nasal packing in place   - Follow up with ENT to check packing    #Aspiration PNA  Found to be increasingly altered per wife and s/p unwitnessed fall day prior to admission. Received CTX 1g at [facility]. Zosyn and nafcillin discontinued.   - Continue meropenem  - Start cefazolin as detailed below in ID section  - Follow ID recs    GI  #Decompensated Liver Cirrhosis  #Hepatic encephalopathy - see Neuro  #?SBP  #Alcohol Hepatitis  History of chronic alcohol use presenting with hepatic encephalopathy ammonia 159-->186 -->120 (most recently) today with distended abdomen, soft and non-tender and diffusely jaundice. Bedside POCUS with small pockets of fluid. Negative hepatitis panel, HIV. Hepatology noted that risks outweigh benefits.  - Prednisolone for severe alcohol hepatitis  - MELD score 41 today  - Maddrey score 200 today --> Radha score  - Child Ferguson score 13-15 (unable to assess mental status while sedated)  - Continue thiamine, folic acid  - GI not planning to proceed with EGD at this time  - F/u fibrinogen, CBC, CMP, ABG q6h  - Would be cautious of steroids given renal failure and bleeding, and if continue would watch very closely for bleeding or infection  - Continue norepi / albumin and can add vasopressin for hepatorenal syndrome treatment, but not a candidate for terlipressin given Cr >5 and vent support    #Melenic stool  1.1L of bright red blood seen in rectal tube.   -Call GI for upper and lower scope.    #Pancreatitis   CT abdomen with some papito-pancreatic stranding. WBC decreasing to 12.8 today. Pt had no abdominal pain on arrival to ED. Tachypneic to 30s-40s upon abdominal palpation. Lipase 1586  -Merna score 0 points  - low risk for mortality, unlikely to be severe pancreatitis.  -Re-assess at 48 hours (9/16 afternoon).  -Pt has hepatorenal syndrome - cannot give maintenance fluid although clinical suspicion is low at this time.  -On fentanyl drip    #Elevated T-bili  Tbili >25, direct bili >16on admission. CTA a/p: normal biliary duct/gallbladder but has hepatic steatosis with hepatomegaly, and portal hypertension.  - T bili stable   - Trend tbili  - LDH elevated at 248, Hapto WNL at 40.    Nephro  #WILLIAM vs WILLIAM on CKD with anuria  #Hepatorenal syndrome  BUN 24/1.81 suspect hepatorenal syndrome in setting of portal hypertension with splanchnic arterial vasodilation resulting in decreased renal perfusion. CK low at 24. Received albumin 250 mL IVP.   - Markedly low UOP @ 25cc since admission.   - Stable BUN/Cr on CVVHD no urine in bladder on POCUS  - On Albumin 25% IVPB 100mg q6h for 3 days. Last day today 9/17  - Norepinephrine with goal MAP 65  - Nephrology onboard: recommend CVVHD.  - Strict I/Os.  - Avoid nephrotoxic med and renally dose meds.    #Hyponatremia   Na 118 on admission, Na 137 today  -RESOLVED    #NAGMA - RESOLVED  Bicarb 18, increased AG 18. Pt met 2/4 SIRS criteria on admission. Had episode of loose/mucous stool.  - Trend bicarb and AG  -  Bicarb 19 CO2 30, pH 7.4      Heme  #Epistaxis  No additional bleeding. Mouth packed by ENT 09/19. Hb decreased to 6.8 today. Additional unit blood given.   - trend CBC to monitor Hgb    #Anemia - stable.  Hb 7.3, .5 likely in setting of alcohol use disorder. History of hemorrhoids. Has ecchymosis on flank however no active bleed and known history of anemia. Partner denies history of esophageal varices. Hb 6.8 today  - Continue Vitamin b12, folic acid 1mg daily.  - f/u serum b12 and folate.  - Type & Screen  - Transfuse for Hb <7 or <8 with active bleed  - LDH high, haptoglobin WNL  - Iron 89, TIBC low <17, ferritin elevated 3,417.   - Coag dysfunction 2/2 liver dysfunction.      #Thrombocytopenia - worsening.  Platelet 115, likely 2/2 alcohol use disorder. 73 today, likely in the setting of critical illness/alcoholic cirrhosis.   - Trend platelets  - Transfuse <10, <20 with fever or <50 with bleeding  - F/u DIC labs- fibrinogen      ID  #Sepsis  Met 2/4 SIRS criteria (leukocytosis, tachycardic) with suspected asp PNA vs SBP. Blood and urine cultures negative at 12 hours. ID recs obtained.   Sputum culture positive for MSSA  - c/w Vanc 750 q12 IV to cover MRSA and coag neg staph due to presence of multiple access lines  - c/w Meropenem 1g q8 IV to cover pseudomonas  - c/w Cefazolin 2g q12 IV (safer in patient with both hepatic and renal impairment)  - of note, the above dosing is for CRRT dosing while pt is on CVVHD- f/u renal recs  - if pt spikes another fever would then recommend collecting another Blood culture  - trend CBC  - f/u BCx  - f/u CT scans ordered by primary team    Fluids: n/a  Electrolytes: Caution in setting of suspected WILLIAM  Nutrition: NPO  DVT ppx: Heparin 5000 q8h (prothrombotic state)  GI ppx: Protonix  Code: DNR/Intubate  Dispo: MICU   53M Hx of EtOH abuse and cirrhosis, anemia requiring transfusion in the past, GERD, and a recent un-witnessed fall BIBEMS for altered mental status now intubated and sedated, admitted for further management of  hepatic encephalopathy and hepatorenal syndrome 2/2 alcohol hepatitis and decompensated liver cirrhosis (MELD 41), found to have right frontal hematoma (epidural vs subdural), and possible pancreatitis. Patient DNR/Intubate    Neuro  #Hepatic encephalopathy  Intubated and sedated. Per wife AAOx3 at baseline. AMS unlikely 2/2 SAH vs SDH seen on CT. Ammonia started at 169 9/14 --> 182 9/16. No need to trend  - On rifaximin and lactulose syrup via NGT. Continue with lactulose q6h and continue rifaximin as per GI recs  - Monitor stool output.  - Continue thiamine, folic acid    #Intra-cranial bleed  CT head with extra-axial hematoma in right frontal lobe. ED at [facility] discussed with neurosurgery and suspect bleed to not be causing AMS. s/p vitamin K, Keppra 1g, and FFP.  - Repeat CT head o/n: no significant change in the small right frontal subdural hematoma. Increased opacification of the paranasal sinuses likely due to intubation.  - Neurosurgery onboard: DENNY  -DENNY    #Anisocoria  On exam, left pupil slightly more dilated than right. Stroke code called for urgent evaluation and CT head performed.   CTA results revealing no arterial steno-occlusive disease, no carotid or vertebral artery significant stenosis or evidence of dissection. Mixed density plaque at carotid bifurcations. CT brain perfusion revealing no perfusion defects. CT brain with bilateral frontoparietal subdural hemorrhages redemonstrated, with slight increase in hyperdense blood at right frontal convexity since 09/15/2023 and otherwise mild increase in size at places allowing for some redistribution effect. No midline shift or hydrocephalus. No parenchymal hemorrhage or recent transcortical infarct apparent.Does not correlate with pupillary response changes.   - No acute intervention at this time             Cardiovascular  #Hepatorenal syndrome  On Levophed for Hepatorenal syndrome. Slightly increased requirements low suspicion for shock.  DENNY    Pulm  #Intubated  #Possible oropharyngeal blood  Findings of considerable blood in oropharynx and nasopharynx requiring suctioning. No additional bleeding. Mouth unpacked. Continued bleeding at the nares rocket inserted, Hgb decreased to 8.3 - 3u pRBC given thus far. ENT came to pack the nares and throat on 09/19.   - Patient still on fentanyl, propofol drips  - Nasal packing in place   - Follow up with ENT to check packing    #Aspiration PNA  Found to be increasingly altered per wife and s/p unwitnessed fall day prior to admission. Received CTX 1g at [facility]. Zosyn and nafcillin discontinued.   - Continue meropenem  - Start cefazolin as detailed below in ID section  - Follow ID recs    GI  #Decompensated Liver Cirrhosis  #Hepatic encephalopathy - see Neuro  #?SBP  #Alcohol Hepatitis  History of chronic alcohol use presenting with hepatic encephalopathy ammonia 159-->186 -->120 (most recently) today with distended abdomen, soft and non-tender and diffusely jaundice. Bedside POCUS with small pockets of fluid. Negative hepatitis panel, HIV. Hepatology noted that risks outweigh benefits.  - Prednisolone for severe alcohol hepatitis  - MELD score 40 today  - Maddrey score 200 today --> Radha score  - Child Ferguson score 13-15 (unable to assess mental status while sedated)  - Continue thiamine, folic acid  - GI not planning to proceed with EGD at this time  - F/u fibrinogen, CBC, CMP, ABG q6h  - Would be cautious of steroids given renal failure and bleeding, and if continue would watch very closely for bleeding or infection  - Continue norepi / albumin and can add vasopressin for hepatorenal syndrome treatment, but not a candidate for terlipressin given Cr >5 and vent support    #Melenic stool  1.1L of bright red blood seen in rectal tube.   -Call GI for upper and lower scope.    #Pancreatitis   CT abdomen with some papito-pancreatic stranding. WBC decreasing to 12.8 today. Pt had no abdominal pain on arrival to ED. Tachypneic to 30s-40s upon abdominal palpation. Lipase 1586  -[place] score 0 points  - low risk for mortality, unlikely to be severe pancreatitis.  -Re-assess at 48 hours (9/16 afternoon).  -Pt has hepatorenal syndrome - cannot give maintenance fluid although clinical suspicion is low at this time.  -On fentanyl drip    #Elevated T-bili  Tbili >25, direct bili >16on admission. CTA a/p: normal biliary duct/gallbladder but has hepatic steatosis with hepatomegaly, and portal hypertension.  - T bili stable   - Trend tbili  - LDH elevated at 248, Hapto WNL at 40.    Nephro  #WILLIAM vs WILLIAM on CKD with anuria  #Hepatorenal syndrome  BUN 24/1.81 suspect hepatorenal syndrome in setting of portal hypertension with splanchnic arterial vasodilation resulting in decreased renal perfusion. CK low at 24. Received albumin 250 mL IVP.   - Markedly low UOP @ 25cc since admission.   - Stable BUN/Cr on CVVHD no urine in bladder on POCUS  - On Albumin 25% IVPB 100mg q6h for 3 days. Last day today 9/17  - Norepinephrine with goal MAP 65  - Nephrology onboard: recommend CVVHD.  - Strict I/Os.  - Avoid nephrotoxic med and renally dose meds.    #Hyponatremia   Na 118 on admission, Na 137 today  -RESOLVED    #NAGMA - RESOLVED  Bicarb 18, increased AG 18. Pt met 2/4 SIRS criteria on admission. Had episode of loose/mucous stool.  - Trend bicarb and AG  -  Bicarb 19 CO2 30, pH 7.4      Heme  #Epistaxis  No additional bleeding. Mouth packed by ENT 09/19. Hb decreased to 6.8 today. Additional unit blood given.   - trend CBC to monitor Hgb    #Anemia - stable.  Hb 7.3, .5 likely in setting of alcohol use disorder. History of hemorrhoids. Has ecchymosis on flank however no active bleed and known history of anemia. Partner denies history of esophageal varices. Hb 6.8 today  - Continue Vitamin b12, folic acid 1mg daily.  - f/u serum b12 and folate.  - Type & Screen  - Transfuse for Hb <7 or <8 with active bleed  - LDH high, haptoglobin WNL  - Iron 89, TIBC low <17, ferritin elevated 3,417.   - Coag dysfunction 2/2 liver dysfunction.      #Thrombocytopenia - worsening.  Platelet 115, likely 2/2 alcohol use disorder. 73 today, likely in the setting of critical illness/alcoholic cirrhosis.   - Trend platelets  - Transfuse <10, <20 with fever or <50 with bleeding  - F/u DIC labs- fibrinogen      ID  #Sepsis  Met 2/4 SIRS criteria (leukocytosis, tachycardic) with suspected asp PNA vs SBP. Blood and urine cultures negative at 12 hours. ID recs obtained.   Sputum culture positive for MSSA  - c/w Vanc 750 q12 IV to cover MRSA and coag neg staph due to presence of multiple access lines  - c/w Meropenem 1g q8 IV to cover pseudomonas  - c/w Cefazolin 2g q12 IV (safer in patient with both hepatic and renal impairment)  - of note, the above dosing is for CRRT dosing while pt is on CVVHD- f/u renal recs  - if pt spikes another fever would then recommend collecting another Blood culture  - trend CBC  - f/u BCx  - f/u CT scans ordered by primary team    Fluids: n/a  Electrolytes: Caution in setting of suspected WILLIAM  Nutrition: NPO  DVT ppx: Heparin 5000 q8h (prothrombotic state)  GI ppx: Protonix  Code: DNR/Intubate  Dispo: MICU   53M Hx of EtOH abuse and cirrhosis, anemia requiring transfusion in the past, GERD, and a recent un-witnessed fall BIBEMS for altered mental status now intubated and sedated, admitted for further management of  hepatic encephalopathy and hepatorenal syndrome 2/2 alcohol hepatitis and decompensated liver cirrhosis (MELD 41), found to have right frontal hematoma (epidural vs subdural), and possible pancreatitis. Patient DNR/Intubate    Neuro  #Hepatic encephalopathy  Intubated and sedated. Per wife AAOx3 at baseline. AMS unlikely 2/2 SAH vs SDH seen on CT. Ammonia started at 169 9/14 --> 182 9/16. No need to trend  - On rifaximin and lactulose syrup via NGT. Continue with lactulose q6h and continue rifaximin as per GI recs  - Monitor stool output.  - Continue thiamine, folic acid    #Intra-cranial bleed  CT head with extra-axial hematoma in right frontal lobe. ED at [facility] discussed with neurosurgery and suspect bleed to not be causing AMS. s/p vitamin K, Keppra 1g, and FFP.  - Repeat CT head o/n: no significant change in the small right frontal subdural hematoma. Increased opacification of the paranasal sinuses likely due to intubation.  - Neurosurgery onboard: DENNY  -DENNY    #Anisocoria  On exam, left pupil slightly more dilated than right. Stroke code called for urgent evaluation and CT head performed.   CTA results revealing no arterial steno-occlusive disease, no carotid or vertebral artery significant stenosis or evidence of dissection. Mixed density plaque at carotid bifurcations. CT brain perfusion revealing no perfusion defects. CT brain with bilateral frontoparietal subdural hemorrhages redemonstrated, with slight increase in hyperdense blood at right frontal convexity since 09/15/2023 and otherwise mild increase in size at places allowing for some redistribution effect. No midline shift or hydrocephalus. No parenchymal hemorrhage or recent transcortical infarct apparent.Does not correlate with pupillary response changes.   - Follow stroke team recs  - Consult neurosurgery  - F/u EEG  - Obtain repeat CT head in AM     Cardiovascular  #Hepatorenal syndrome  On Levophed for Hepatorenal syndrome. Slightly increased requirements low suspicion for shock.  DENNY    Pulm  #Intubated  #Possible oropharyngeal blood  Findings of considerable blood in oropharynx and nasopharynx requiring suctioning. No additional bleeding. Mouth unpacked. Continued bleeding at the nares rocket inserted, Hgb decreased to 8.3 - 3u pRBC given thus far. ENT came to pack the nares and throat on 09/19.   - Patient still on fentanyl, propofol drips  - Nasal packing in place   - Follow up with ENT to check packing    #Aspiration PNA  Found to be increasingly altered per wife and s/p unwitnessed fall day prior to admission. Received CTX 1g at [facility]. Zosyn and nafcillin discontinued.   - Continue meropenem  - Start cefazolin as detailed below in ID section  - Follow ID recs    GI  #Decompensated Liver Cirrhosis  #Hepatic encephalopathy - see Neuro  #?SBP  #Alcohol Hepatitis  History of chronic alcohol use presenting with hepatic encephalopathy ammonia 159-->186 -->120 (most recently) today with distended abdomen, soft and non-tender and diffusely jaundice. Bedside POCUS with small pockets of fluid. Negative hepatitis panel, HIV. Hepatology noted that risks outweigh benefits.  - Prednisolone for severe alcohol hepatitis  - MELD score 40 today  - Maddrey score 200 today --> Radha score  - Child Ferguson score 13-15 (unable to assess mental status while sedated)  - Continue thiamine, folic acid  - GI not planning to proceed with EGD at this time  - F/u fibrinogen, CBC, CMP, ABG q6h  - Would be cautious of steroids given renal failure and bleeding, and if continue would watch very closely for bleeding or infection  - Continue norepi / albumin and can add vasopressin for hepatorenal syndrome treatment, but not a candidate for terlipressin given Cr >5 and vent support    #Melenic stool  1.1L of bright red blood seen in rectal tube.   -Call GI for upper and lower scope.    #Pancreatitis   CT abdomen with some papito-pancreatic stranding. WBC decreasing to 12.8 today. Pt had no abdominal pain on arrival to ED. Tachypneic to 30s-40s upon abdominal palpation. Lipase 1586  -Merna score 0 points  - low risk for mortality, unlikely to be severe pancreatitis.  -Re-assess at 48 hours (9/16 afternoon).  -Pt has hepatorenal syndrome - cannot give maintenance fluid although clinical suspicion is low at this time.  -On fentanyl drip    #Elevated T-bili  Tbili >25, direct bili >16on admission. CTA a/p: normal biliary duct/gallbladder but has hepatic steatosis with hepatomegaly, and portal hypertension.  - T bili stable   - Trend tbili  - LDH elevated at 248, Hapto WNL at 40.    Nephro  #WILLIAM vs WILLIAM on CKD with anuria  #Hepatorenal syndrome  BUN 24/1.81 suspect hepatorenal syndrome in setting of portal hypertension with splanchnic arterial vasodilation resulting in decreased renal perfusion. CK low at 24. Received albumin 250 mL IVP.   - Markedly low UOP @ 25cc since admission.   - Stable BUN/Cr on CVVHD no urine in bladder on POCUS  - On Albumin 25% IVPB 100mg q6h for 3 days. Last day today 9/17  - Norepinephrine with goal MAP 65  - Nephrology onboard: recommend CVVHD.  - Strict I/Os.  - Avoid nephrotoxic med and renally dose meds.    #Hyponatremia   Na 118 on admission, Na 137 today  -RESOLVED    #NAGMA - RESOLVED  Bicarb 18, increased AG 18. Pt met 2/4 SIRS criteria on admission. Had episode of loose/mucous stool.  - Trend bicarb and AG  -  Bicarb 19 CO2 30, pH 7.4    Heme  #Epistaxis  No additional bleeding. Mouth packed by ENT 09/19. Hb decreased to 6.8 today. Additional unit blood given.   - trend CBC to monitor Hgb    #Anemia - stable.  Hb 7.3, .5 likely in setting of alcohol use disorder. History of hemorrhoids. Has ecchymosis on flank however no active bleed and known history of anemia. Partner denies history of esophageal varices. Hb 6.8 today  - Continue Vitamin b12, folic acid 1mg daily.  - f/u serum b12 and folate.  - Type & Screen  - Transfuse for Hb <7 or <8 with active bleed  - LDH high, haptoglobin WNL  - Iron 89, TIBC low <17, ferritin elevated 3,417.   - Coag dysfunction 2/2 liver dysfunction.      #Thrombocytopenia - worsening.  Platelet count 90 -> 83 likely in the setting of critical illness/alcoholic cirrhosis. Received 1u of cryo and ordered 1u platelets to be tranfused.   - Trend platelets  - Transfuse <10, <20 with fever or <50 with bleeding  - F/u DIC labs- fibrinogen      ID  #Sepsis  Met 2/4 SIRS criteria (leukocytosis, tachycardic) with suspected asp PNA vs SBP. Blood and urine cultures negative at 12 hours. ID recs obtained.   Sputum culture positive for MSSA  - c/w Vanc 750 q12 IV to cover MRSA and coag neg staph due to presence of multiple access lines  - c/w Meropenem 1g q8 IV to cover pseudomonas  - c/w Cefazolin 2g q12 IV (safer in patient with both hepatic and renal impairment)  - of note, the above dosing is for CRRT dosing while pt is on CVVHD- f/u renal recs  - if pt spikes another fever would then recommend collecting another Blood culture  - trend CBC  - f/u BCx  - f/u CT scans ordered by primary team    Fluids: n/a  Electrolytes: Caution in setting of suspected WILLIAM  Nutrition: NPO  DVT ppx: Heparin 5000 q8h (prothrombotic state)  GI ppx: Protonix  Code: DNR/Intubate  Dispo: MICU

## 2023-09-21 NOTE — PROGRESS NOTE ADULT - ASSESSMENT
53[facility], now requiring CVVHD for clearance , with  signs of recovery, continue RRT.     Last 24h: IN: 4025.6 mL / OUT: 850 mL / NET: 3175.6 mL    Tolerating CVVHD with the following prescription:  CRRT Treatment:   Modality: CVVHD, Filter: NxStage CAR-505, Target Blood Flow: 300 mL/Min  Target Fluid Balance: Titrate to net EVEN fluid balance (09-21-23 @ 14:07) [Active]  Phoxillum Filtration BK 4 / 2.5: Solution, 5000 milliLiter(s) infuse at 4000 mL/Hr; infuse through CRRT Circuit  Administration Instructions: Continuous Renal Replacement Therapy (CRRT)  Special Instructions: Dialysate (09-21-23 @ 14:07) [Active]    -Anuric ATN iso multiorgan failure/shock, on CVVHD:   Cont. CVVHD (net even) will perform daily assessments of blood chemistry and volume status.   Remains anuric, requiring vasopressors. Poor prognosis.   Avoid hypotension, maintain SBP>120 as possible.   Daily weights.   Daily labs while on CVVHD,, P 4.5 today, check P levels daily.   Strict I&Os. If Duron dc please perform daily bladder scans.

## 2023-09-21 NOTE — PROGRESS NOTE ADULT - SUBJECTIVE AND OBJECTIVE BOX
Patient is evaluated at bedside. Intubated, icteric, critically ill tolerating CVVHD. Poor prognosis.     Review of Systems:  Unable to obtain.     PHYSICAL EXAM:  GENERAL: icteric, ETT/MV, requiring pressors, critically ill.   HEENT:  icteric sclera, dry blood around his mouth.   CHEST/LUNG: scattered rales bilaterally, no  rhonchi, wheezing or rubs.   HEART: Regular rate and rhythm, no murmurs.   ABDOMEN: distended, tympanic but soft.  EXTREMITIES: . No clubbing, cyanosis, trace edema   NERVOUS SYSTEM:  RAAS -3, sedated.   SKIN: diffuse jaundice  Access: [facility] HD cath accessed, placed on 9/15.    Allergies:  No Known Allergies    Hospital Medications:   MEDICATIONS  (STANDING):  artificial tears (preservative free) Ophthalmic Solution 1 Drop(s) Both EYES daily  ceFAZolin   IVPB 2000 milliGRAM(s) IV Intermittent every 12 hours  chlorhexidine 0.12% Liquid 15 milliLiter(s) Oral Mucosa every 12 hours  chlorhexidine 2% Cloths 1 Application(s) Topical <User Schedule>  CRRT Treatment    <Continuous>  folic acid 1 milliGRAM(s) Oral daily  heparin   Injectable 5000 Unit(s) SubCutaneous every 8 hours  lactulose Syrup 30 Gram(s) Oral every 6 hours  levETIRAcetam  IVPB 500 milliGRAM(s) IV Intermittent every 12 hours  meropenem  IVPB 1000 milliGRAM(s) IV Intermittent every 8 hours  multivitamin 1 Tablet(s) Oral daily  mupirocin 2% Nasal 1 Application(s) Both Nostrils two times a day  norepinephrine Infusion 0.05 MICROgram(s)/kG/Min (4.13 mL/Hr) IV Continuous <Continuous>  octreotide  Infusion 50 MICROgram(s)/Hr (10 mL/Hr) IV Continuous <Continuous>  oxymetazoline 0.05% Nasal Spray 1 Spray(s) Both Nostrils two times a day  pantoprazole  Injectable 40 milliGRAM(s) IV Push every 12 hours  Phoxillum Filtration BK 4 / 2.5 5000 milliLiter(s) (4000 mL/Hr) CRRT <Continuous>  propofol Infusion 10 MICROgram(s)/kG/Min (5.28 mL/Hr) IV Continuous <Continuous>  rifAXIMin 550 milliGRAM(s) Oral two times a day  Tranexamic acid 2000 milliGRAM(s) 2000 milliGRAM(s) Nasal once  vasopressin Infusion 0.04 Unit(s)/Min (6 mL/Hr) IV Continuous <Continuous>    VITALS:  T(F): 98.1 (09-21-23 @ 06:03), Max: 98.8 (09-20-23 @ 22:40)  HR: 85 (09-21-23 @ 17:35)  BP: 132/60 (09-20-23 @ 22:00)  RR: 15 (09-21-23 @ 17:00)  SpO2: 98% (09-21-23 @ 17:35)  Wt(kg): --    09-19 @ 07:01  -  09-20 @ 07:00  --------------------------------------------------------  IN: 2740.2 mL / OUT: 800 mL / NET: 1940.2 mL    09-20 @ 07:01  -  09-21 @ 07:00  --------------------------------------------------------  IN: 4025.6 mL / OUT: 850 mL / NET: 3175.6 mL    09-21 @ 07:01  -  09-21 @ 17:58  --------------------------------------------------------  IN: 1157.2 mL / OUT: 135 mL / NET: 1022.2 mL      LABS:  09-21    137  |  102  |  16  ----------------------------<  50<LL>  4.6   |  21<L>  |  1.18    Ca    7.4<L>      21 Sep 2023 12:46  Phos  4.5     09-21  Mg     2.5     09-21    TPro  5.0<L>  /  Alb  3.0<L>  /  TBili  23.7<H>  /  DBili      /  AST  211<H>  /  ALT  73<H>  /  AlkPhos  97  09-21                          7.3    30.07 )-----------( 125      ( 21 Sep 2023 13:05 )             20.9

## 2023-09-21 NOTE — CONSULT NOTE ADULT - NS ATTEND AMEND GEN_ALL_CORE FT
I agree with the history, exam, assessment and plan as outlined above.  For now oral cavity and oropharynx to be packed with soaked decongestant.  This can be changed daily until there is no evidence of any further bleeding.
Patient with alcoholic cirrhosis admitted with AMS and recurrent falls , currently being treated for a number of medical complications including hepatic encephalopathy and hepatorenal syndrome, SDH, and thrombocytopenia. Stroke code called for anisocoria, unclear onset. Exam limited by sedation as above. Repeat HCT with bilateral SDH with new hyperdensity within the R f/p SDH. Rec discussion with neurosurgery; agree with plt transfusion. Consider EEG in s/o SDH though AMS is likely multifactorial. Agree w ongoing GOC discussions given overall seemingly poor prognosis.

## 2023-09-21 NOTE — PROGRESS NOTE ADULT - ASSESSMENT
·	family wishes to begin transition to symptom-directed approach, tentative plan to de-escalate care tomorrow  ·	wish to maintain current life support so patient's sister can visit (she lands late tonight) but otherwise does not wish for further lab draws or imaging  ·	would stop vEEG and DVT PPX; keep Abx for tonight but will stop tomorrow 54yo M with PMH of Alcohol Use Disorder, Cirrhosis, Anemia, and GERD p/w encephalopathy and found to have decompensated liver disease. Palliative consulted for complex medical decision making in the setting of critical illness.    ·	family wishes to begin transition to symptom-directed approach, tentative plan to de-escalate care tomorrow  ·	wish to maintain current life support so patient's sister can visit (she lands late tonight) but otherwise does not wish for further lab draws or imaging  ·	would stop vEEG and DVT PPX; keep Abx for tonight but will stop tomorrow

## 2023-09-21 NOTE — CONSULT NOTE ADULT - CONSULT REQUESTED DATE/TIME
15-Sep-2023 06:07
21-Sep-2023 12:54
14-Sep-2023 20:44
15-Sep-2023 14:23
18-Sep-2023 10:41
15-Sep-2023 01:59
20-Sep-2023 16:03
15-Sep-2023 12:43

## 2023-09-21 NOTE — PROVIDER CONTACT NOTE (CRITICAL VALUE NOTIFICATION) - ACTION/TREATMENT ORDERED:
1u PRBCs
1 U PRBCs ordered.
repeated blood glucose with glucometer - resulted in 50. will give 1 amp D50%

## 2023-09-21 NOTE — PROGRESS NOTE ADULT - CONVERSATION DETAILS
Reviewed patient's hospital course and interval developments. Discussed advanced directives including code status, HCP completion, and hospice eligibility. Given further decompensation, explained that transplant is not a realistic option and meaningful recovery is not expected. Family is in agreement that current level of care will only prolong suffering. Will maintain life support for patient's sister to come see him but likely de-escalation tomorrow.

## 2023-09-21 NOTE — CONSULT NOTE ADULT - CONSULT REASON
Hyponatremia, WILILAM
septic shock
Anisocoria
CTAP prelim read with possible hemoperitoneum
decompensated cirrhosis
throat - oral trauma s/p intubation
Pain/Symptom Management and Complex Medical Decision Making/GOC in the setting of Critical Illness
extra-axial hematoma

## 2023-09-21 NOTE — PROGRESS NOTE ADULT - SUBJECTIVE AND OBJECTIVE BOX
REASON FOR CONSULTATION: Stroke code today morning for anisocoria, +CTH w/ slight increase in hyperdense blood    HPI:  53M Hx of EtOH abuse and cirrhosis, anemia, and GERD requiring transfusion in the past, brought in by EMS for altered mental status.  Patient's partner states that he had a mechanical fall last night, afterwards told her not to call EMS to take him to the hospital.  Today, patient has been less responsive than usual, so she called EMS to bring him in.  Partner states that patient developed extensive bruising to left abdomen and flank 2 to 3 days ago, prior to last night's fall. Pt normally drinks 1 bottle of vodka daily but has been cutting back to a few shots. Unable to obtain ROS as patient somnolent.      In the ED:    - VS: T HR 90 BP 97-38 RR 20 -100% 12L Nonrebreather    - Pertinent Labs: Hb 12.6, Hb 7.3, plt 115, INR 2.56, Na 118, Cl 85, bicarb 18, BUN 55, Cr 6.78, Tbili >25, direct bili >16, Alk felecia 173, , ALT 46, ammonia 169, lipase 265, BNP 2505, UA moderate LE, + nitrite, WBC 21, RBC 32    - Imaging: CXR: CT head: extra-axial hematoma right frontal region  CTA chest/a/p: No dissection/aneurysm, hepatic steatosis with hepatomegaly and signs of portal hypertension, borderline splenomegaly, trace ascites  EKG:     - Treatment/interventions: lactulose 20mg x1, keppra 1g, phytonadione 10mg x1, 3L NS    PMHx: EtOH abuse and cirrhosis, anemia, and GERD   PSHx:   Meds: See med rec  Allergies: NKDA  Social: see below   (14 Sep 2023 19:54)    Neurosurgery re-consulted for CT imaging done today showing "bilateral frontoparietal subdural hemorrhages redemonstrated, with slight increase in hyperdense blood at right frontal convexity since 09/15".     REVIEW OF SYSTEMS: Unable to assess due to patient's mental status.       MEDICATIONS:  Antibiotics:  ceFAZolin   IVPB 2000 milliGRAM(s) IV Intermittent every 12 hours  meropenem  IVPB 1000 milliGRAM(s) IV Intermittent every 8 hours  rifAXIMin 550 milliGRAM(s) Oral two times a day    Neuro:  levETIRAcetam  IVPB 500 milliGRAM(s) IV Intermittent every 12 hours  propofol Infusion 10 MICROgram(s)/kG/Min IV Continuous <Continuous>    Anticoagulation:  heparin   Injectable 5000 Unit(s) SubCutaneous every 8 hours    OTHER:  artificial tears (preservative free) Ophthalmic Solution 1 Drop(s) Both EYES daily  chlorhexidine 0.12% Liquid 15 milliLiter(s) Oral Mucosa every 12 hours  chlorhexidine 2% Cloths 1 Application(s) Topical <User Schedule>  CRRT Treatment    <Continuous>  lactulose Syrup 30 Gram(s) Oral every 6 hours  mupirocin 2% Nasal 1 Application(s) Both Nostrils two times a day  norepinephrine Infusion 0.05 MICROgram(s)/kG/Min IV Continuous <Continuous>  octreotide  Infusion 50 MICROgram(s)/Hr IV Continuous <Continuous>  oxymetazoline 0.05% Nasal Spray 1 Spray(s) Both Nostrils two times a day  pantoprazole  Injectable 40 milliGRAM(s) IV Push every 12 hours  Phoxillum Filtration BK 4 / 2.5 5000 milliLiter(s) CRRT <Continuous>  Tranexamic acid 2000 milliGRAM(s) 2000 milliGRAM(s) Nasal once  vasopressin Infusion 0.04 Unit(s)/Min IV Continuous <Continuous>    IVF:  folic acid 1 milliGRAM(s) Oral daily  multivitamin 1 Tablet(s) Oral daily  sodium chloride 0.9% lock flush 10 milliLiter(s) IV Push every 1 hour PRN      Vital Signs Last 24 Hrs  T(C): 36.7 (21 Sep 2023 06:03), Max: 37.1 (20 Sep 2023 22:40)  T(F): 98.1 (21 Sep 2023 06:03), Max: 98.8 (20 Sep 2023 22:40)  HR: 98 (21 Sep 2023 14:00) (56 - 187)  BP: 132/60 (20 Sep 2023 22:00) (132/60 - 132/60)  BP(mean): 87 (20 Sep 2023 22:00) (87 - 87)  RR: 16 (21 Sep 2023 14:00) (12 - 20)  SpO2: 99% (21 Sep 2023 14:00) (91% - 99%)    Parameters below as of 21 Sep 2023 14:00  Patient On (Oxygen Delivery Method): ventilator    O2 Concentration (%): 40    PHYSICAL EXAM:   General: Pt is supine in bed, jaundiced, intubated, sedated, non-verbal, does not track or follow commands   HEENT: cough and gag reflex intact, unable to assess other cranial nerves, PERRL 3mm, +scleral icterus, bilateral corneal reflex (R>L)  Cardiovascular: RRR, normal S1 and S2   Respiratory: +vent, stable FiO2 and PEEP   GI: abd soft, distended   Neuro: A&O x 0, withdraws to noxious stimuli x4     LABS:                        7.3    30.07 )-----------( 125      ( 21 Sep 2023 13:05 )             20.9     09-21    137  |  102  |  16  ----------------------------<  50<LL>  4.6   |  21<L>  |  1.18    Ca    7.4<L>      21 Sep 2023 12:46  Phos  4.5     09-21  Mg     2.5     09-21    TPro  5.0<L>  /  Alb  3.0<L>  /  TBili  23.7<H>  /  DBili  x   /  AST  211<H>  /  ALT  73<H>  /  AlkPhos  97  09-21    PT/INR - ( 21 Sep 2023 12:46 )   PT: 23.0 sec;   INR: 2.06          PTT - ( 20 Sep 2023 16:39 )  PTT:70.8 sec  Urinalysis Basic - ( 21 Sep 2023 12:46 )    Color: x / Appearance: x / SG: x / pH: x  Gluc: 50 mg/dL / Ketone: x  / Bili: x / Urobili: x   Blood: x / Protein: x / Nitrite: x   Leuk Esterase: x / RBC: x / WBC x   Sq Epi: x / Non Sq Epi: x / Bacteria: x        RADIOLOGY & ADDITIONAL STUDIES:  < from: CT Head No Cont (09.15.23 @ 03:16) >  FINDINGS:  VENTRICLES AND SULCI: Parenchymal volume is commensurate with patient age   and stable from prior exam.  INTRA-AXIAL: No intracranial mass, acute hemorrhage, midline shift or   acute transcortical infarct is seen. There is trace subcortical and   periventricular-white matter hypoattenuation, nonspecific but favored to   reflect sequela of chronic microvascular ischemia. The vascular system is   consistent with retained contrast from prior chest CT.  EXTRA-AXIAL: Allowing for difference in technique there is no change in   small right frontal subdural hematoma.  VISUALIZED SINUSES: Increased fluid in the nasopharynx and left nasal   cavity with increased opacification of the left ethmoid air cells and   increased fluid in the sphenoid sinus likely related to intubation.  VISUALIZED MASTOIDS: Clear.  CALVARIUM: No acute calvarial fracture.      IMPRESSION: Allowing for difference in technique technique there is no   significant change in the small right frontal subdural hematoma.   Increased opacification of the paranasal sinuses likely due to intubation.      < from: CT Brain Stroke Protocol (09.21.23 @ 09:24) >  FINDINGS:  INTRA-AXIAL: No intracranial mass effect, acute hemorrhage, midline shift   or acute transcortical infarct is seen. There are patchy periventricular   white matter hypodensities which are nonspecific and may represent the   sequela of long-standing small vessel ischemic disease which is not   significantly changed.  EXTRA-AXIAL: Right frontal subdural hematoma redemonstrated with   hyperdense blood slightly increased in size, now with a diameter of   around 9 mm, previously around 7 mm. Less dense subdural hemorrhage over   the more superior right frontoparietal convexity is increased in volume   as well, 8 mm on coronal image 44, previously 4 mm. However, hemorrhage   along the interhemispheric fissure and tentorium is diminished. There is   also left parietal subdural hemorrhage which measures 6 mm on coronal   image 50, previously 5 mm, without new hyperdense component. Despite   collections, no midline shift or downward herniation.  VENTRICLES AND SULCI: No hydrocephalus. There is mild diffuse parenchymal   volume loss.  VISUALIZED SINUSES: Sinuses included on this scan are completely   opacified and nasal cavity is opacified with packing and/or hemostatic   material on the right side and a small catheter on the left side.  VISUALIZED MASTOIDS: No acute disease.  CALVARIUM: No fracture.      IMPRESSION:    There are bilateral frontoparietal subdural hemorrhages redemonstrated,   with slight increase in hyperdense blood at right frontal convexity since   09/15/2023 and otherwise mild increase in size at places allowing for   some redistribution effect. No midline shift or hydrocephalus.    No parenchymal hemorrhage or recent transcortical infarct apparent.      Assessment:  54 yo M with PMHx of hepatic cirrhosis, alcohol use disorder who presented with AMS, admitted for presumed hepatic encephalopathy, coagulapathy hepatorenal syndrome to the MICU for complex medical management.  CTH on 9/15 for report of fall concerning for R extra-axial hematoma. Neurosurgery was re-consulted today for acute stroke code called this morning, with stroke protocol workup revealing some changes in imaging.     Plan:   -No acute surgical intervention at this time   -CT imaging reviewed with Dr. Jackson   -MICU team aware and plan for goals of care     Please reach out if you have any further questions.     Case discussed w/ Dr. Jackson.    REASON FOR CONSULTATION: Stroke code today morning for anisocoria, +CTH w/ slight increase in hyperdense blood    HPI:  53M Hx of EtOH abuse and cirrhosis, anemia, and GERD requiring transfusion in the past, brought in by EMS for altered mental status.  Patient's partner states that he had a mechanical fall last night, afterwards told her not to call EMS to take him to the hospital.  Today, patient has been less responsive than usual, so she called EMS to bring him in.  Partner states that patient developed extensive bruising to left abdomen and flank 2 to 3 days ago, prior to last night's fall. Pt normally drinks 1 bottle of vodka daily but has been cutting back to a few shots. Unable to obtain ROS as patient somnolent.      In the ED:    - VS: T HR 90 BP 97-38 RR 20 -100% 12L Nonrebreather    - Pertinent Labs: Hb 12.6, Hb 7.3, plt 115, INR 2.56, Na 118, Cl 85, bicarb 18, BUN 55, Cr 6.78, Tbili >25, direct bili >16, Alk felecia 173, , ALT 46, ammonia 169, lipase 265, BNP 2505, UA moderate LE, + nitrite, WBC 21, RBC 32    - Imaging: CXR: CT head: extra-axial hematoma right frontal region  CTA chest/a/p: No dissection/aneurysm, hepatic steatosis with hepatomegaly and signs of portal hypertension, borderline splenomegaly, trace ascites  EKG:     - Treatment/interventions: lactulose 20mg x1, keppra 1g, phytonadione 10mg x1, 3L NS    PMHx: EtOH abuse and cirrhosis, anemia, and GERD   PSHx:   Meds: See med rec  Allergies: NKDA  Social: see below   (14 Sep 2023 19:54)    Neurosurgery re-consulted for CT imaging done today showing "bilateral frontoparietal subdural hemorrhages redemonstrated, with slight increase in hyperdense blood at right frontal convexity since 09/15".     REVIEW OF SYSTEMS: Unable to assess due to patient's mental status.       MEDICATIONS:  Antibiotics:  ceFAZolin   IVPB 2000 milliGRAM(s) IV Intermittent every 12 hours  meropenem  IVPB 1000 milliGRAM(s) IV Intermittent every 8 hours  rifAXIMin 550 milliGRAM(s) Oral two times a day    Neuro:  levETIRAcetam  IVPB 500 milliGRAM(s) IV Intermittent every 12 hours  propofol Infusion 10 MICROgram(s)/kG/Min IV Continuous <Continuous>    Anticoagulation:  heparin   Injectable 5000 Unit(s) SubCutaneous every 8 hours    OTHER:  artificial tears (preservative free) Ophthalmic Solution 1 Drop(s) Both EYES daily  chlorhexidine 0.12% Liquid 15 milliLiter(s) Oral Mucosa every 12 hours  chlorhexidine 2% Cloths 1 Application(s) Topical <User Schedule>  CRRT Treatment    <Continuous>  lactulose Syrup 30 Gram(s) Oral every 6 hours  mupirocin 2% Nasal 1 Application(s) Both Nostrils two times a day  norepinephrine Infusion 0.05 MICROgram(s)/kG/Min IV Continuous <Continuous>  octreotide  Infusion 50 MICROgram(s)/Hr IV Continuous <Continuous>  oxymetazoline 0.05% Nasal Spray 1 Spray(s) Both Nostrils two times a day  pantoprazole  Injectable 40 milliGRAM(s) IV Push every 12 hours  Phoxillum Filtration BK 4 / 2.5 5000 milliLiter(s) CRRT <Continuous>  Tranexamic acid 2000 milliGRAM(s) 2000 milliGRAM(s) Nasal once  vasopressin Infusion 0.04 Unit(s)/Min IV Continuous <Continuous>    IVF:  folic acid 1 milliGRAM(s) Oral daily  multivitamin 1 Tablet(s) Oral daily  sodium chloride 0.9% lock flush 10 milliLiter(s) IV Push every 1 hour PRN      Vital Signs Last 24 Hrs  T(C): 36.7 (21 Sep 2023 06:03), Max: 37.1 (20 Sep 2023 22:40)  T(F): 98.1 (21 Sep 2023 06:03), Max: 98.8 (20 Sep 2023 22:40)  HR: 98 (21 Sep 2023 14:00) (56 - 187)  BP: 132/60 (20 Sep 2023 22:00) (132/60 - 132/60)  BP(mean): 87 (20 Sep 2023 22:00) (87 - 87)  RR: 16 (21 Sep 2023 14:00) (12 - 20)  SpO2: 99% (21 Sep 2023 14:00) (91% - 99%)    Parameters below as of 21 Sep 2023 14:00  Patient On (Oxygen Delivery Method): ventilator    O2 Concentration (%): 40    PHYSICAL EXAM:   General: Pt is supine in bed, jaundiced, intubated, sedated, non-verbal, does not track or follow commands   HEENT: cough and gag reflex intact, PERRL 3mm, +scleral icterus, bilateral corneal reflex (R>L), unable to assess other cranial nerves   Cardiovascular: RRR, normal S1 and S2   Respiratory: +vent, stable FiO2 and PEEP   GI: abd soft, distended   Neuro: A&O x 0, withdraws to noxious stimuli x4     LABS:                        7.3    30.07 )-----------( 125      ( 21 Sep 2023 13:05 )             20.9     09-21    137  |  102  |  16  ----------------------------<  50<LL>  4.6   |  21<L>  |  1.18    Ca    7.4<L>      21 Sep 2023 12:46  Phos  4.5     09-21  Mg     2.5     09-21    TPro  5.0<L>  /  Alb  3.0<L>  /  TBili  23.7<H>  /  DBili  x   /  AST  211<H>  /  ALT  73<H>  /  AlkPhos  97  09-21    PT/INR - ( 21 Sep 2023 12:46 )   PT: 23.0 sec;   INR: 2.06          PTT - ( 20 Sep 2023 16:39 )  PTT:70.8 sec  Urinalysis Basic - ( 21 Sep 2023 12:46 )    Color: x / Appearance: x / SG: x / pH: x  Gluc: 50 mg/dL / Ketone: x  / Bili: x / Urobili: x   Blood: x / Protein: x / Nitrite: x   Leuk Esterase: x / RBC: x / WBC x   Sq Epi: x / Non Sq Epi: x / Bacteria: x        RADIOLOGY & ADDITIONAL STUDIES:  < from: CT Head No Cont (09.15.23 @ 03:16) >  FINDINGS:  VENTRICLES AND SULCI: Parenchymal volume is commensurate with patient age   and stable from prior exam.  INTRA-AXIAL: No intracranial mass, acute hemorrhage, midline shift or   acute transcortical infarct is seen. There is trace subcortical and   periventricular-white matter hypoattenuation, nonspecific but favored to   reflect sequela of chronic microvascular ischemia. The vascular system is   consistent with retained contrast from prior chest CT.  EXTRA-AXIAL: Allowing for difference in technique there is no change in   small right frontal subdural hematoma.  VISUALIZED SINUSES: Increased fluid in the nasopharynx and left nasal   cavity with increased opacification of the left ethmoid air cells and   increased fluid in the sphenoid sinus likely related to intubation.  VISUALIZED MASTOIDS: Clear.  CALVARIUM: No acute calvarial fracture.      IMPRESSION: Allowing for difference in technique technique there is no   significant change in the small right frontal subdural hematoma.   Increased opacification of the paranasal sinuses likely due to intubation.      < from: CT Brain Stroke Protocol (09.21.23 @ 09:24) >  FINDINGS:  INTRA-AXIAL: No intracranial mass effect, acute hemorrhage, midline shift   or acute transcortical infarct is seen. There are patchy periventricular   white matter hypodensities which are nonspecific and may represent the   sequela of long-standing small vessel ischemic disease which is not   significantly changed.  EXTRA-AXIAL: Right frontal subdural hematoma redemonstrated with   hyperdense blood slightly increased in size, now with a diameter of   around 9 mm, previously around 7 mm. Less dense subdural hemorrhage over   the more superior right frontoparietal convexity is increased in volume   as well, 8 mm on coronal image 44, previously 4 mm. However, hemorrhage   along the interhemispheric fissure and tentorium is diminished. There is   also left parietal subdural hemorrhage which measures 6 mm on coronal   image 50, previously 5 mm, without new hyperdense component. Despite   collections, no midline shift or downward herniation.  VENTRICLES AND SULCI: No hydrocephalus. There is mild diffuse parenchymal   volume loss.  VISUALIZED SINUSES: Sinuses included on this scan are completely   opacified and nasal cavity is opacified with packing and/or hemostatic   material on the right side and a small catheter on the left side.  VISUALIZED MASTOIDS: No acute disease.  CALVARIUM: No fracture.      IMPRESSION:    There are bilateral frontoparietal subdural hemorrhages redemonstrated,   with slight increase in hyperdense blood at right frontal convexity since   09/15/2023 and otherwise mild increase in size at places allowing for   some redistribution effect. No midline shift or hydrocephalus.    No parenchymal hemorrhage or recent transcortical infarct apparent.      Assessment:  54 yo M with PMHx of hepatic cirrhosis, alcohol use disorder who presented with AMS, admitted for presumed hepatic encephalopathy, coagulapathy hepatorenal syndrome to the MICU for complex medical management.  CTH on 9/15 for report of fall concerning for R extra-axial hematoma. Neurosurgery was re-consulted today for acute stroke code called this morning, with stroke protocol workup revealing some changes in imaging.     Plan:   -No acute surgical intervention at this time   -CT imaging reviewed with Dr. Jackson   -MICU team aware and plan for goals of care     Please reach out if you have any further questions.     Case discussed w/ Dr. Jackson.    REASON FOR CONSULTATION: Stroke code today morning for anisocoria, +CTH w/ slight increase in hyperdense blood    HPI:  53M Hx of EtOH abuse and cirrhosis, anemia, and GERD requiring transfusion in the past, brought in by EMS for altered mental status.  Patient's partner states that he had a mechanical fall last night, afterwards told her not to call EMS to take him to the hospital.  Today, patient has been less responsive than usual, so she called EMS to bring him in.  Partner states that patient developed extensive bruising to left abdomen and flank 2 to 3 days ago, prior to last night's fall. Pt normally drinks 1 bottle of vodka daily but has been cutting back to a few shots. Unable to obtain ROS as patient somnolent.      In the ED:    - VS: T HR 90 BP 97-38 RR 20 -100% 12L Nonrebreather    - Pertinent Labs: Hb 12.6, Hb 7.3, plt 115, INR 2.56, Na 118, Cl 85, bicarb 18, BUN 55, Cr 6.78, Tbili >25, direct bili >16, Alk felecia 173, , ALT 46, ammonia 169, lipase 265, BNP 2505, UA moderate LE, + nitrite, WBC 21, RBC 32    - Imaging: CXR: CT head: extra-axial hematoma right frontal region  CTA chest/a/p: No dissection/aneurysm, hepatic steatosis with hepatomegaly and signs of portal hypertension, borderline splenomegaly, trace ascites  EKG:     - Treatment/interventions: lactulose 20mg x1, keppra 1g, phytonadione 10mg x1, 3L NS    PMHx: EtOH abuse and cirrhosis, anemia, and GERD   PSHx:   Meds: See med rec  Allergies: NKDA  Social: see below   (14 Sep 2023 19:54)    Neurosurgery re-consulted for CT imaging done today showing "bilateral frontoparietal subdural hemorrhages redemonstrated, with slight increase in hyperdense blood at right frontal convexity since 09/15".     REVIEW OF SYSTEMS: Unable to assess due to patient's mental status.       MEDICATIONS:  Antibiotics:  ceFAZolin   IVPB 2000 milliGRAM(s) IV Intermittent every 12 hours  meropenem  IVPB 1000 milliGRAM(s) IV Intermittent every 8 hours  rifAXIMin 550 milliGRAM(s) Oral two times a day    Neuro:  levETIRAcetam  IVPB 500 milliGRAM(s) IV Intermittent every 12 hours  propofol Infusion 10 MICROgram(s)/kG/Min IV Continuous <Continuous>    Anticoagulation:  heparin   Injectable 5000 Unit(s) SubCutaneous every 8 hours    OTHER:  artificial tears (preservative free) Ophthalmic Solution 1 Drop(s) Both EYES daily  chlorhexidine 0.12% Liquid 15 milliLiter(s) Oral Mucosa every 12 hours  chlorhexidine 2% Cloths 1 Application(s) Topical <User Schedule>  CRRT Treatment    <Continuous>  lactulose Syrup 30 Gram(s) Oral every 6 hours  mupirocin 2% Nasal 1 Application(s) Both Nostrils two times a day  norepinephrine Infusion 0.05 MICROgram(s)/kG/Min IV Continuous <Continuous>  octreotide  Infusion 50 MICROgram(s)/Hr IV Continuous <Continuous>  oxymetazoline 0.05% Nasal Spray 1 Spray(s) Both Nostrils two times a day  pantoprazole  Injectable 40 milliGRAM(s) IV Push every 12 hours  Phoxillum Filtration BK 4 / 2.5 5000 milliLiter(s) CRRT <Continuous>  Tranexamic acid 2000 milliGRAM(s) 2000 milliGRAM(s) Nasal once  vasopressin Infusion 0.04 Unit(s)/Min IV Continuous <Continuous>    IVF:  folic acid 1 milliGRAM(s) Oral daily  multivitamin 1 Tablet(s) Oral daily  sodium chloride 0.9% lock flush 10 milliLiter(s) IV Push every 1 hour PRN      Vital Signs Last 24 Hrs  T(C): 36.7 (21 Sep 2023 06:03), Max: 37.1 (20 Sep 2023 22:40)  T(F): 98.1 (21 Sep 2023 06:03), Max: 98.8 (20 Sep 2023 22:40)  HR: 98 (21 Sep 2023 14:00) (56 - 187)  BP: 132/60 (20 Sep 2023 22:00) (132/60 - 132/60)  BP(mean): 87 (20 Sep 2023 22:00) (87 - 87)  RR: 16 (21 Sep 2023 14:00) (12 - 20)  SpO2: 99% (21 Sep 2023 14:00) (91% - 99%)    Parameters below as of 21 Sep 2023 14:00  Patient On (Oxygen Delivery Method): ventilator    O2 Concentration (%): 40    PHYSICAL EXAM:   General: Pt is supine in bed, jaundiced, intubated, sedated, non-verbal, does not track or follow commands   HEENT: cough and gag reflex intact, L pupil 4mm brisk, R pupil 3mm brisk, +scleral icterus, bilateral corneal reflex (R>L), unable to assess other cranial nerves   Cardiovascular: RRR, normal S1 and S2   Respiratory: +vent, stable FiO2 and PEEP   GI: abd soft, distended   Neuro: A&O x 0, withdraws to noxious stimuli x4     LABS:                        7.3    30.07 )-----------( 125      ( 21 Sep 2023 13:05 )             20.9     09-21    137  |  102  |  16  ----------------------------<  50<LL>  4.6   |  21<L>  |  1.18    Ca    7.4<L>      21 Sep 2023 12:46  Phos  4.5     09-21  Mg     2.5     09-21    TPro  5.0<L>  /  Alb  3.0<L>  /  TBili  23.7<H>  /  DBili  x   /  AST  211<H>  /  ALT  73<H>  /  AlkPhos  97  09-21    PT/INR - ( 21 Sep 2023 12:46 )   PT: 23.0 sec;   INR: 2.06          PTT - ( 20 Sep 2023 16:39 )  PTT:70.8 sec  Urinalysis Basic - ( 21 Sep 2023 12:46 )    Color: x / Appearance: x / SG: x / pH: x  Gluc: 50 mg/dL / Ketone: x  / Bili: x / Urobili: x   Blood: x / Protein: x / Nitrite: x   Leuk Esterase: x / RBC: x / WBC x   Sq Epi: x / Non Sq Epi: x / Bacteria: x        RADIOLOGY & ADDITIONAL STUDIES:  < from: CT Head No Cont (09.15.23 @ 03:16) >  FINDINGS:  VENTRICLES AND SULCI: Parenchymal volume is commensurate with patient age   and stable from prior exam.  INTRA-AXIAL: No intracranial mass, acute hemorrhage, midline shift or   acute transcortical infarct is seen. There is trace subcortical and   periventricular-white matter hypoattenuation, nonspecific but favored to   reflect sequela of chronic microvascular ischemia. The vascular system is   consistent with retained contrast from prior chest CT.  EXTRA-AXIAL: Allowing for difference in technique there is no change in   small right frontal subdural hematoma.  VISUALIZED SINUSES: Increased fluid in the nasopharynx and left nasal   cavity with increased opacification of the left ethmoid air cells and   increased fluid in the sphenoid sinus likely related to intubation.  VISUALIZED MASTOIDS: Clear.  CALVARIUM: No acute calvarial fracture.      IMPRESSION: Allowing for difference in technique technique there is no   significant change in the small right frontal subdural hematoma.   Increased opacification of the paranasal sinuses likely due to intubation.      < from: CT Brain Stroke Protocol (09.21.23 @ 09:24) >  FINDINGS:  INTRA-AXIAL: No intracranial mass effect, acute hemorrhage, midline shift   or acute transcortical infarct is seen. There are patchy periventricular   white matter hypodensities which are nonspecific and may represent the   sequela of long-standing small vessel ischemic disease which is not   significantly changed.  EXTRA-AXIAL: Right frontal subdural hematoma redemonstrated with   hyperdense blood slightly increased in size, now with a diameter of   around 9 mm, previously around 7 mm. Less dense subdural hemorrhage over   the more superior right frontoparietal convexity is increased in volume   as well, 8 mm on coronal image 44, previously 4 mm. However, hemorrhage   along the interhemispheric fissure and tentorium is diminished. There is   also left parietal subdural hemorrhage which measures 6 mm on coronal   image 50, previously 5 mm, without new hyperdense component. Despite   collections, no midline shift or downward herniation.  VENTRICLES AND SULCI: No hydrocephalus. There is mild diffuse parenchymal   volume loss.  VISUALIZED SINUSES: Sinuses included on this scan are completely   opacified and nasal cavity is opacified with packing and/or hemostatic   material on the right side and a small catheter on the left side.  VISUALIZED MASTOIDS: No acute disease.  CALVARIUM: No fracture.      IMPRESSION:    There are bilateral frontoparietal subdural hemorrhages redemonstrated,   with slight increase in hyperdense blood at right frontal convexity since   09/15/2023 and otherwise mild increase in size at places allowing for   some redistribution effect. No midline shift or hydrocephalus.    No parenchymal hemorrhage or recent transcortical infarct apparent.      Assessment:  54 yo M with PMHx of hepatic cirrhosis, alcohol use disorder who presented with AMS, admitted for presumed hepatic encephalopathy, coagulapathy hepatorenal syndrome to the MICU for complex medical management.  CTH on 9/15 for report of fall concerning for R extra-axial hematoma. Neurosurgery was re-consulted today for acute stroke code called this morning, with stroke protocol workup revealing some changes in imaging.     Plan:   -No acute surgical intervention at this time   -CT imaging reviewed with Dr. Jackson   -MICU team aware and plan for goals of care     Please reach out if you have any further questions.     Case discussed w/ Dr. Jackson.    REASON FOR CONSULTATION: Stroke code today morning for anisocoria, +CTH w/ slight increase in hyperdense blood    HPI:  53M Hx of EtOH abuse and cirrhosis, anemia, and GERD requiring transfusion in the past, brought in by EMS for altered mental status.  Patient's partner states that he had a mechanical fall last night, afterwards told her not to call EMS to take him to the hospital.  Today, patient has been less responsive than usual, so she called EMS to bring him in.  Partner states that patient developed extensive bruising to left abdomen and flank 2 to 3 days ago, prior to last night's fall. Pt normally drinks 1 bottle of vodka daily but has been cutting back to a few shots. Unable to obtain ROS as patient somnolent.      In the ED:    - VS: T HR 90 BP 97-38 RR 20 -100% 12L Nonrebreather    - Pertinent Labs: Hb 12.6, Hb 7.3, plt 115, INR 2.56, Na 118, Cl 85, bicarb 18, BUN 55, Cr 6.78, Tbili >25, direct bili >16, Alk felecia 173, , ALT 46, ammonia 169, lipase 265, BNP 2505, UA moderate LE, + nitrite, WBC 21, RBC 32    - Imaging: CXR: CT head: extra-axial hematoma right frontal region  CTA chest/a/p: No dissection/aneurysm, hepatic steatosis with hepatomegaly and signs of portal hypertension, borderline splenomegaly, trace ascites  EKG:     - Treatment/interventions: lactulose 20mg x1, keppra 1g, phytonadione 10mg x1, 3L NS    PMHx: EtOH abuse and cirrhosis, anemia, and GERD   PSHx:   Meds: See med rec  Allergies: NKDA  Social: see below   (14 Sep 2023 19:54)    Neurosurgery re-consulted for CT imaging done today showing "bilateral frontoparietal subdural hemorrhages redemonstrated, with slight increase in hyperdense blood at right frontal convexity since 09/15".     REVIEW OF SYSTEMS: Unable to assess due to patient's mental status.       MEDICATIONS:  Antibiotics:  ceFAZolin   IVPB 2000 milliGRAM(s) IV Intermittent every 12 hours  meropenem  IVPB 1000 milliGRAM(s) IV Intermittent every 8 hours  rifAXIMin 550 milliGRAM(s) Oral two times a day    Neuro:  levETIRAcetam  IVPB 500 milliGRAM(s) IV Intermittent every 12 hours  propofol Infusion 10 MICROgram(s)/kG/Min IV Continuous <Continuous>    Anticoagulation:  heparin   Injectable 5000 Unit(s) SubCutaneous every 8 hours    OTHER:  artificial tears (preservative free) Ophthalmic Solution 1 Drop(s) Both EYES daily  chlorhexidine 0.12% Liquid 15 milliLiter(s) Oral Mucosa every 12 hours  chlorhexidine 2% Cloths 1 Application(s) Topical <User Schedule>  CRRT Treatment    <Continuous>  lactulose Syrup 30 Gram(s) Oral every 6 hours  mupirocin 2% Nasal 1 Application(s) Both Nostrils two times a day  norepinephrine Infusion 0.05 MICROgram(s)/kG/Min IV Continuous <Continuous>  octreotide  Infusion 50 MICROgram(s)/Hr IV Continuous <Continuous>  oxymetazoline 0.05% Nasal Spray 1 Spray(s) Both Nostrils two times a day  pantoprazole  Injectable 40 milliGRAM(s) IV Push every 12 hours  Phoxillum Filtration BK 4 / 2.5 5000 milliLiter(s) CRRT <Continuous>  Tranexamic acid 2000 milliGRAM(s) 2000 milliGRAM(s) Nasal once  vasopressin Infusion 0.04 Unit(s)/Min IV Continuous <Continuous>    IVF:  folic acid 1 milliGRAM(s) Oral daily  multivitamin 1 Tablet(s) Oral daily  sodium chloride 0.9% lock flush 10 milliLiter(s) IV Push every 1 hour PRN      Vital Signs Last 24 Hrs  T(C): 36.7 (21 Sep 2023 06:03), Max: 37.1 (20 Sep 2023 22:40)  T(F): 98.1 (21 Sep 2023 06:03), Max: 98.8 (20 Sep 2023 22:40)  HR: 98 (21 Sep 2023 14:00) (56 - 187)  BP: 132/60 (20 Sep 2023 22:00) (132/60 - 132/60)  BP(mean): 87 (20 Sep 2023 22:00) (87 - 87)  RR: 16 (21 Sep 2023 14:00) (12 - 20)  SpO2: 99% (21 Sep 2023 14:00) (91% - 99%)    Parameters below as of 21 Sep 2023 14:00  Patient On (Oxygen Delivery Method): ventilator    O2 Concentration (%): 40    PHYSICAL EXAM:   General: Pt is supine in bed, jaundiced, intubated, sedated, non-verbal, does not track or follow commands   HEENT: cough and gag reflex intact, L pupil 4mm brisk, R pupil 3mm brisk, +scleral icterus, bilateral corneal reflex (R>L), unable to assess other cranial nerves   Cardiovascular: RRR, normal S1 and S2   Respiratory: +vent, stable FiO2 and PEEP   GI: abd soft, distended   Neuro: A&O x 0, withdraws to noxious stimuli x4     LABS:                        7.3    30.07 )-----------( 125      ( 21 Sep 2023 13:05 )             20.9     09-21    137  |  102  |  16  ----------------------------<  50<LL>  4.6   |  21<L>  |  1.18    Ca    7.4<L>      21 Sep 2023 12:46  Phos  4.5     09-21  Mg     2.5     09-21    TPro  5.0<L>  /  Alb  3.0<L>  /  TBili  23.7<H>  /  DBili  x   /  AST  211<H>  /  ALT  73<H>  /  AlkPhos  97  09-21    PT/INR - ( 21 Sep 2023 12:46 )   PT: 23.0 sec;   INR: 2.06          PTT - ( 20 Sep 2023 16:39 )  PTT:70.8 sec  Urinalysis Basic - ( 21 Sep 2023 12:46 )    Color: x / Appearance: x / SG: x / pH: x  Gluc: 50 mg/dL / Ketone: x  / Bili: x / Urobili: x   Blood: x / Protein: x / Nitrite: x   Leuk Esterase: x / RBC: x / WBC x   Sq Epi: x / Non Sq Epi: x / Bacteria: x        RADIOLOGY & ADDITIONAL STUDIES:  < from: CT Head No Cont (09.15.23 @ 03:16) >  FINDINGS:  VENTRICLES AND SULCI: Parenchymal volume is commensurate with patient age   and stable from prior exam.  INTRA-AXIAL: No intracranial mass, acute hemorrhage, midline shift or   acute transcortical infarct is seen. There is trace subcortical and   periventricular-white matter hypoattenuation, nonspecific but favored to   reflect sequela of chronic microvascular ischemia. The vascular system is   consistent with retained contrast from prior chest CT.  EXTRA-AXIAL: Allowing for difference in technique there is no change in   small right frontal subdural hematoma.  VISUALIZED SINUSES: Increased fluid in the nasopharynx and left nasal   cavity with increased opacification of the left ethmoid air cells and   increased fluid in the sphenoid sinus likely related to intubation.  VISUALIZED MASTOIDS: Clear.  CALVARIUM: No acute calvarial fracture.      IMPRESSION: Allowing for difference in technique technique there is no   significant change in the small right frontal subdural hematoma.   Increased opacification of the paranasal sinuses likely due to intubation.      < from: CT Brain Stroke Protocol (09.21.23 @ 09:24) >  FINDINGS:  INTRA-AXIAL: No intracranial mass effect, acute hemorrhage, midline shift   or acute transcortical infarct is seen. There are patchy periventricular   white matter hypodensities which are nonspecific and may represent the   sequela of long-standing small vessel ischemic disease which is not   significantly changed.  EXTRA-AXIAL: Right frontal subdural hematoma redemonstrated with   hyperdense blood slightly increased in size, now with a diameter of   around 9 mm, previously around 7 mm. Less dense subdural hemorrhage over   the more superior right frontoparietal convexity is increased in volume   as well, 8 mm on coronal image 44, previously 4 mm. However, hemorrhage   along the interhemispheric fissure and tentorium is diminished. There is   also left parietal subdural hemorrhage which measures 6 mm on coronal   image 50, previously 5 mm, without new hyperdense component. Despite   collections, no midline shift or downward herniation.  VENTRICLES AND SULCI: No hydrocephalus. There is mild diffuse parenchymal   volume loss.  VISUALIZED SINUSES: Sinuses included on this scan are completely   opacified and nasal cavity is opacified with packing and/or hemostatic   material on the right side and a small catheter on the left side.  VISUALIZED MASTOIDS: No acute disease.  CALVARIUM: No fracture.      IMPRESSION:    There are bilateral frontoparietal subdural hemorrhages redemonstrated,   with slight increase in hyperdense blood at right frontal convexity since   09/15/2023 and otherwise mild increase in size at places allowing for   some redistribution effect. No midline shift or hydrocephalus.    No parenchymal hemorrhage or recent transcortical infarct apparent.      Assessment:  52 yo M with PMHx of hepatic cirrhosis, alcohol use disorder who presented with AMS, admitted for presumed hepatic encephalopathy, coagulapathy hepatorenal syndrome to the MICU for complex medical management.  CTH on 9/15 for report of fall concerning for R extra-axial hematoma. Neurosurgery was re-consulted today for acute stroke code called this morning, with stroke protocol workup revealing some changes in imaging.     Plan:   -No acute surgical intervention at this time   -CT imaging reviewed with Dr. Jackson   -MICU team aware and plan is for goals of care with family    Please reach out if you have any further questions.     Case discussed w/ Dr. Jackson.

## 2023-09-21 NOTE — CONSULT NOTE ADULT - ASSESSMENT
53M Hx of EtOH abuse and cirrhosis, anemia requiring transfusion in the past, GERD, and a recent un-witnessed fall BIBEMS for altered mental status now intubated and sedated, admitted for further management of  hepatic encephalopathy and hepatorenal syndrome 2/2 alcohol hepatitis and decompensated liver cirrhosis (MELD 41), found to have right frontal hematoma SDH, and possible pancreatitis. Course has been complicated by aspiration PNA. Stroke code was called this AM due to findings of anisocoria, left greater than right, however both pupils briskly reactive to light. NCHCT demonstrated bilateral frontoparietal subdural hemorrhages, with slight increase in hyperdense blood at right frontal convexity since 09/15/2023 and otherwise mild increase in size at places allowing for some redistribution effect. CTA head and neck significant for mixed density plaque at carotid bifurcations. CT perfusion scan negative.     - recommend neurosurgery consult for bilateral subdural hematomas  - can consider EEG for AMS and at increased risk of seizure given SDHs, however, patient has multiple medical issues that may be contributing to his AMS  - please obtain NCHCT tomorrow AM to monitor SDHs  - please obtain A1C, lipid panel, and TSH    Case discussed with Dr. Kaia Vargas and Dr. Sandoval 53M Hx of EtOH abuse and cirrhosis, anemia requiring transfusion in the past, GERD, and a recent un-witnessed fall BIBEMS for altered mental status now intubated and sedated, admitted for further management of  hepatic encephalopathy and hepatorenal syndrome 2/2 alcohol hepatitis and decompensated liver cirrhosis (MELD 41), found to have right frontal hematoma SDH, and possible pancreatitis. Course has been complicated by aspiration PNA. Stroke code was called this AM due to findings of anisocoria, left greater than right, however both pupils briskly reactive to light. NCHCT demonstrated bilateral frontoparietal subdural hemorrhages, with slight increase in hyperdense blood at right frontal convexity since 09/15/2023 and otherwise mild increase in size at places allowing for some redistribution effect. CTA head and neck significant for mixed density plaque at carotid bifurcations. CT perfusion scan negative.     - recommend neurosurgery consult for bilateral subdural hematomas  - can consider EEG for AMS and at increased risk of seizure given SDHs, however, patient has multiple medical issues that may be contributing to his AMS  - please obtain NCHCT tomorrow AM to monitor SDHs  - please obtain A1C, lipid panel, and TSH  - Please thiamine IV 500mg TID x3d then 100mg dDay IV or TF and for dc daily    Case discussed with Dr. Kaia Vargas and Dr. Sandoval  Recommendations see above: reviewed and edited as needed.    Markos Sandoval MD PHD  Vascular neurology fellow

## 2023-09-21 NOTE — PROGRESS NOTE ADULT - SUBJECTIVE AND OBJECTIVE BOX
[facility] Geriatrics and Palliative Care  Domingo Bennett, Palliative Care Attending  Contact Info: Call [phone] (HEAL Line) or message on Microsoft Teams (Domingo Bennett)    SUBJECTIVE AND OBJECTIVE:  INTERVAL HPI/OVERNIGHT EVENTS: Interval events noted. See patient's PRN use for the past 24hrs noted below. Comprehensive symptom assessment and GOC exploration as noted below. Extensive time spent discussing plan of care with family. No unexpected adverse effects of opiates noted: no sedation/dizziness/nausea.    ALLERGIES:  No Known Allergies    MEDICATIONS  (STANDING):  artificial tears (preservative free) Ophthalmic Solution 1 Drop(s) Both EYES daily  ceFAZolin   IVPB 2000 milliGRAM(s) IV Intermittent every 12 hours  chlorhexidine 0.12% Liquid 15 milliLiter(s) Oral Mucosa every 12 hours  chlorhexidine 2% Cloths 1 Application(s) Topical <User Schedule>  CRRT Treatment    <Continuous>  fentaNYL   Infusion. 0.5 MICROgram(s)/kG/Hr (4.4 mL/Hr) IV Continuous <Continuous>  folic acid 1 milliGRAM(s) Oral daily  lactulose Syrup 30 Gram(s) Oral every 6 hours  levETIRAcetam  IVPB 500 milliGRAM(s) IV Intermittent every 12 hours  meropenem  IVPB 1000 milliGRAM(s) IV Intermittent every 8 hours  multivitamin 1 Tablet(s) Oral daily  mupirocin 2% Nasal 1 Application(s) Both Nostrils two times a day  norepinephrine Infusion 0.05 MICROgram(s)/kG/Min (4.13 mL/Hr) IV Continuous <Continuous>  octreotide  Infusion 50 MICROgram(s)/Hr (10 mL/Hr) IV Continuous <Continuous>  oxymetazoline 0.05% Nasal Spray 1 Spray(s) Both Nostrils two times a day  pantoprazole  Injectable 40 milliGRAM(s) IV Push every 12 hours  Phoxillum Filtration BK 4 / 2.5 5000 milliLiter(s) (4000 mL/Hr) CRRT <Continuous>  propofol Infusion 10 MICROgram(s)/kG/Min (5.28 mL/Hr) IV Continuous <Continuous>  rifAXIMin 550 milliGRAM(s) Oral two times a day  Tranexamic acid 2000 milliGRAM(s) 2000 milliGRAM(s) Nasal once  vasopressin Infusion 0.04 Unit(s)/Min (6 mL/Hr) IV Continuous <Continuous>    MEDICATIONS  (PRN):  sodium chloride 0.9% lock flush 10 milliLiter(s) IV Push every 1 hour PRN Pre/post blood products, medications, blood draw, and to maintain line patency      Analgesic Use (Scheduled and PRNs) for past 24 hours:    levETIRAcetam  IVPB   400 mL/Hr IV Intermittent (09-21-23 @ 17:19)   400 mL/Hr IV Intermittent (09-21-23 @ 05:47)    propofol Infusion   5.28 mL/Hr IV Continuous (09-21-23 @ 11:10)   5.28 mL/Hr IV Continuous (09-21-23 @ 05:47)   5.28 mL/Hr IV Continuous (09-21-23 @ 00:08)      ITEMS UNCHECKED ARE NOT PRESENT  PRESENT SYMPTOMS/REVIEW OF SYSTEMS: []Unable to obtain due to poor mentation   Source if other than patient:  []Family   []Team         Vital Signs Last 24 Hrs  T(C): 36.9 (21 Sep 2023 19:11), Max: 37.1 (20 Sep 2023 22:40)  T(F): 98.4 (21 Sep 2023 19:11), Max: 98.8 (20 Sep 2023 22:40)  HR: 84 (21 Sep 2023 18:00) (56 - 187)  BP: 132/60 (20 Sep 2023 22:00) (132/60 - 132/60)  BP(mean): 87 (20 Sep 2023 22:00) (87 - 87)  RR: 17 (21 Sep 2023 18:00) (12 - 20)  SpO2: 97% (21 Sep 2023 18:00) (91% - 99%)    Parameters below as of 21 Sep 2023 18:00  Patient On (Oxygen Delivery Method): ventilator    O2 Concentration (%): 40    LABS: Personally reviewed and interpreted                        7.3    30.07 )-----------( 125      ( 21 Sep 2023 13:05 )             20.9   09-21    137  |  102  |  16  ----------------------------<  50<LL>  4.6   |  21<L>  |  1.18    Ca    7.4<L>      21 Sep 2023 12:46  Phos  4.5     09-21  Mg     2.5     09-21    TPro  5.0<L>  /  Alb  3.0<L>  /  TBili  23.7<H>  /  DBili  x   /  AST  211<H>  /  ALT  73<H>  /  AlkPhos  97  09-21      RADIOLOGY & ADDITIONAL STUDIES: None new    DISCUSSION OF CASE: Family - to provide updates and emotional support; Primary Team/RN - to discuss plan of care [facility] Geriatrics and Palliative Care  Domingo Bennett, Palliative Care Attending  Contact Info: Call [phone] (HEAL Line) or message on Microsoft Teams (Domingo Bennett)    SUBJECTIVE AND OBJECTIVE:  INTERVAL HPI/OVERNIGHT EVENTS: Interval events noted. See patient's PRN use for the past 24hrs noted below. Comprehensive symptom assessment and GOC exploration as noted below. Extensive time spent discussing plan of care with family. No unexpected adverse effects of opiates noted. Noted to have expanding SDH on imaging. Continues to deteriorate. Unable to participate in interview.    ALLERGIES:  No Known Allergies    MEDICATIONS  (STANDING):  artificial tears (preservative free) Ophthalmic Solution 1 Drop(s) Both EYES daily  ceFAZolin   IVPB 2000 milliGRAM(s) IV Intermittent every 12 hours  chlorhexidine 0.12% Liquid 15 milliLiter(s) Oral Mucosa every 12 hours  chlorhexidine 2% Cloths 1 Application(s) Topical <User Schedule>  CRRT Treatment    <Continuous>  fentaNYL   Infusion. 0.5 MICROgram(s)/kG/Hr (4.4 mL/Hr) IV Continuous <Continuous>  folic acid 1 milliGRAM(s) Oral daily  lactulose Syrup 30 Gram(s) Oral every 6 hours  levETIRAcetam  IVPB 500 milliGRAM(s) IV Intermittent every 12 hours  meropenem  IVPB 1000 milliGRAM(s) IV Intermittent every 8 hours  multivitamin 1 Tablet(s) Oral daily  mupirocin 2% Nasal 1 Application(s) Both Nostrils two times a day  norepinephrine Infusion 0.05 MICROgram(s)/kG/Min (4.13 mL/Hr) IV Continuous <Continuous>  octreotide  Infusion 50 MICROgram(s)/Hr (10 mL/Hr) IV Continuous <Continuous>  oxymetazoline 0.05% Nasal Spray 1 Spray(s) Both Nostrils two times a day  pantoprazole  Injectable 40 milliGRAM(s) IV Push every 12 hours  Phoxillum Filtration BK 4 / 2.5 5000 milliLiter(s) (4000 mL/Hr) CRRT <Continuous>  propofol Infusion 10 MICROgram(s)/kG/Min (5.28 mL/Hr) IV Continuous <Continuous>  rifAXIMin 550 milliGRAM(s) Oral two times a day  Tranexamic acid 2000 milliGRAM(s) 2000 milliGRAM(s) Nasal once  vasopressin Infusion 0.04 Unit(s)/Min (6 mL/Hr) IV Continuous <Continuous>    MEDICATIONS  (PRN):  sodium chloride 0.9% lock flush 10 milliLiter(s) IV Push every 1 hour PRN Pre/post blood products, medications, blood draw, and to maintain line patency    Analgesic Use (Scheduled and PRNs) for past 24 hours:  levETIRAcetam  IVPB   400 mL/Hr IV Intermittent (09-21-23 @ 17:19)   400 mL/Hr IV Intermittent (09-21-23 @ 05:47)    propofol Infusion   5.28 mL/Hr IV Continuous (09-21-23 @ 11:10)   5.28 mL/Hr IV Continuous (09-21-23 @ 05:47)   5.28 mL/Hr IV Continuous (09-21-23 @ 00:08)    ITEMS UNCHECKED ARE NOT PRESENT  PRESENT SYMPTOMS/REVIEW OF SYSTEMS: []Unable to obtain due to poor mentation   Source if other than patient:  []Family   []Team     Pain: [] yes [x] no  QOL impact -  Location -  Aggravating factors -  Quality -  Radiation -  Timing -  Severity (0-10 scale) -  Minimal acceptable level (0-10 scale) -    PAINAD Score: 0  CPOT Score: 0    Dyspnea:                           []Mild  []Moderate []Severe  Anxiety:                             []Mild []Moderate []Severe  Fatigue:                             []Mild []Moderate []Severe  Nausea:                             []Mild []Moderate []Severe  Loss of appetite:              []Mild []Moderate []Severe  Constipation:                    []Mild []Moderate []Severe    Other Symptoms:  [x]All other review of systems negative     Palliative Performance Status Version 2: 10%    GENERAL:  [] NAD []Alert [x]Lethargic  []Cachexia  [x]Unarousable  []Verbal  [x]Non-Verbal  BEHAVIORAL:   []Anxiety  [x]Delirium []Agitation []Cooperative []Oriented x  HEENT:  []Normal  [] Moist Mucous Membranes []Dry mouth   [x]ET Tube/Trach  []Oral lesions  PULMONARY:   []Clear []Tachypnea  []Audible excessive secretions  [x]Normal Work of Breathing []Labored Breathing  [x]Rhonchi []Crackles []Wheezing  CARDIOVASCULAR:    [x]Regular Rate [x]Regular Rhythm []Irregular []Tachy  []Be  GASTROINTESTINAL:  [x]Soft  [x]Distended   [x]+BS  [x]Non tender []Tender  []PEG [x]OGT/ NGT  Last BM:  GENITOURINARY:  []Normal [] Incontinent   []Oliguria/Anuria   [x]Duron  MUSCULOSKELETAL:   []Normal Extremities  [x]Weakness  [x]Bed/Wheelchair bound []Edema  NEUROLOGIC:   []No focal deficits  []Cognitive impairment  [x]Dysphagia []Dysarthria []Paresis [x]Encephalopathic  SKIN:   [x]Jaundice   []Pressure ulcer(s)  []Rash    CRITICAL CARE:  [x]Shock Present  [ ]Septic [ ]Cardiogenic [ ]Neurologic [ ]Hypovolemic  [x] Vasopressors [ ]Inotropes   [x]Respiratory failure present [x]Mechanical Ventilation [ ]Non-invasive ventilatory support [ ]High-Flow  [x]Acute  [ ]Chronic [x]Hypoxic  [ ]Hypercarbic   [x]Other organ failure: Renal    Vital Signs Last 24 Hrs  T(C): 36.9 (21 Sep 2023 19:11), Max: 37.1 (20 Sep 2023 22:40)  T(F): 98.4 (21 Sep 2023 19:11), Max: 98.8 (20 Sep 2023 22:40)  HR: 84 (21 Sep 2023 18:00) (56 - 187)  BP: 132/60 (20 Sep 2023 22:00) (132/60 - 132/60)  BP(mean): 87 (20 Sep 2023 22:00) (87 - 87)  RR: 17 (21 Sep 2023 18:00) (12 - 20)  SpO2: 97% (21 Sep 2023 18:00) (91% - 99%)    Parameters below as of 21 Sep 2023 18:00  Patient On (Oxygen Delivery Method): ventilator    O2 Concentration (%): 40    LABS: Personally reviewed and interpreted                     7.3    30.07 )-----------( 125      ( 21 Sep 2023 13:05 )             20.9   09-21    137  |  102  |  16  ----------------------------<  50<LL>  4.6   |  21<L>  |  1.18    Ca    7.4<L>      21 Sep 2023 12:46  Phos  4.5     09-21  Mg     2.5     09-21  TPro  5.0<L>  /  Alb  3.0<L>  /  TBili  23.7<H>  /  DBili  x   /  AST  211<H>  /  ALT  73<H>  /  AlkPhos  97  09-21    RADIOLOGY & ADDITIONAL STUDIES: Personally reviewed and interpreted  < from: CT Brain Stroke Protocol (09.21.23 @ 09:24) >  There are bilateral frontoparietal subdural hemorrhages redemonstrated,   with slight increase in hyperdense blood at right frontal convexity since   09/15/2023 and otherwise mild increase in size at places allowing for   some redistribution effect. No midline shift or hydrocephalus.    DISCUSSION OF CASE: Family - to provide updates and emotional support; Primary Team/RN - to discuss plan of care

## 2023-09-21 NOTE — PROGRESS NOTE ADULT - SUBJECTIVE AND OBJECTIVE BOX
INTERVAL HPI/OVERNIGHT EVENTS:   No mesenteric ischemia per CT scan. Lactate 4.1 -> 3.6. Vancomycin trough level therapeutic. Another trough ordered for 1pm 09/21. Fibrinogen 106, didn't get cryo. 2 am labs lateral. Brief episode of bradycardia.     Subjective: Patient seen and examined at bedside. AAOx0.     PHYSICAL EXAM:  GENERAL: ill appearing, intubated, sedation weaned  HEAD:  Atraumatic   EYES: icteric sclera  ENT: nasal packing   CHEST/LUNG: on vent stable FiO2 and PEEP, lungs clear to auscultation b/l  ABDOMEN: large and distended but soft and non-tender.  EXTREMITIES: Difficult to palpate DP, able to lightly palpate left posterior tibial; 2+ pitting edema of LE and cool to touch   NERVOUS SYSTEM:  patient sedated   SKIN: diffuse jaundice throughout body    ICU Vital Signs Last 24 Hrs  T(C): 36.7 (21 Sep 2023 06:03), Max: 37.1 (20 Sep 2023 22:40)  T(F): 98.1 (21 Sep 2023 06:03), Max: 98.8 (20 Sep 2023 22:40)  HR: 86 (21 Sep 2023 06:00) (56 - 187)  BP: 132/60 (20 Sep 2023 22:00) (81/52 - 132/60)  BP(mean): 87 (20 Sep 2023 22:00) (62 - 87)  ABP: 113/49 (21 Sep 2023 06:00) (74/46 - 132/59)  ABP(mean): 70 (21 Sep 2023 06:00) (52 - 81)  RR: 13 (21 Sep 2023 06:00) (12 - 25)  SpO2: 95% (21 Sep 2023 06:00) (91% - 97%)    O2 Parameters below as of 21 Sep 2023 07:00  Patient On (Oxygen Delivery Method): ventilator    O2 Concentration (%): 40        I&O's Detail    20 Sep 2023 07:01  -  21 Sep 2023 07:00  --------------------------------------------------------  IN:    IV PiggyBack: 1750 mL    Lactated Ringers Bolus: 500 mL    Norepinephrine: 214.5 mL    Norepinephrine: 556.3 mL    Octreotide: 230 mL    PRBCs (Packed Red Blood Cells): 300 mL    Propofol: 360.8 mL    Vasopressin: 114 mL  Total IN: 4025.6 mL    OUT:    Nasogastric/Oral tube (mL): 300 mL    Other (mL): 50 mL    Rectal Tube (mL): 500 mL  Total OUT: 850 mL    Total NET: 3175.6 mL    Mode: AC/ CMV (Assist Control/ Continuous Mandatory Ventilation)  RR (machine): 16  TV (machine): 450  FiO2: 40  PEEP: 5  ITime: 1  MAP: 8.7  PIP: 16    LABS:                         7.7    28.11 )-----------( 83       ( 21 Sep 2023 05:46 )             21.7     09-21    136  |  102  |  18  ----------------------------<  66<L>  4.6   |  21<L>  |  1.29    Ca    7.5<L>      21 Sep 2023 05:46  Phos  4.5     09-21  Mg     2.5     09-21    TPro  5.0<L>  /  Alb  3.0<L>  /  TBili  23.7<H>  /  DBili  x   /  AST  211<H>  /  ALT  73<H>  /  AlkPhos  97  09-21    PT/INR - ( 21 Sep 2023 05:46 )   PT: 25.9 sec;   INR: 2.33          PTT - ( 20 Sep 2023 16:39 )  PTT:70.8 sec  Urinalysis Basic - ( 21 Sep 2023 05:46 )    Color: x / Appearance: x / SG: x / pH: x  Gluc: 66 mg/dL / Ketone: x  / Bili: x / Urobili: x   Blood: x / Protein: x / Nitrite: x   Leuk Esterase: x / RBC: x / WBC x   Sq Epi: x / Non Sq Epi: x / Bacteria: x      Lactate, Blood: 3.6 mmol/L (09-21 @ 05:46)  Lactate, Blood: 3.7 mmol/L (09-21 @ 01:41)  Lactate, Blood: 4.1 mmol/L (09-20 @ 21:28)      RADIOLOGY, EKG & ADDITIONAL TESTS: Reviewed.            RADIOLOGY, EKG & ADDITIONAL TESTS: Reviewed.   Consultant(s) Notes Reviewed:  [x ] YES  [ ] NO    MEDICATIONS  (STANDING):  chlorhexidine 0.12% Liquid 15 milliLiter(s) Oral Mucosa every 12 hours  chlorhexidine 2% Cloths 1 Application(s) Topical <User Schedule>  CRRT Treatment    <Continuous>  cyanocobalamin Injectable 1000 MICROGram(s) IntraMuscular every 24 hours  dexMEDEtomidine Infusion 0.2 MICROgram(s)/kG/Hr (4.4 mL/Hr) IV Continuous <Continuous>  folic acid 1 milliGRAM(s) Oral daily  heparin   Injectable 5000 Unit(s) SubCutaneous every 8 hours  lactulose Syrup 30 Gram(s) Oral every 4 hours  levETIRAcetam  IVPB 500 milliGRAM(s) IV Intermittent every 12 hours  multivitamin 1 Tablet(s) Oral daily  mupirocin 2% Nasal 1 Application(s) Both Nostrils two times a day  nafcillin  IVPB      nafcillin  IVPB 2 Gram(s) IV Intermittent every 4 hours  norepinephrine Infusion 0.05 MICROgram(s)/kG/Min (8.25 mL/Hr) IV Continuous <Continuous>  octreotide  Infusion 50 MICROgram(s)/Hr (10 mL/Hr) IV Continuous <Continuous>  oxymetazoline 0.05% Nasal Spray 1 Spray(s) Both Nostrils two times a day  pantoprazole  Injectable 40 milliGRAM(s) IV Push every 12 hours  Phoxillum Filtration BK 4 / 2.5 5000 milliLiter(s) (1300 mL/Hr) CRRT <Continuous>  piperacillin/tazobactam IVPB.. 4.5 Gram(s) IV Intermittent every 8 hours  prednisoLONE    3 mG/mL Solution (ORAPRED) 40 milliGRAM(s) Oral daily  rifAXIMin 550 milliGRAM(s) Oral two times a day    MEDICATIONS  (PRN):  sodium chloride 0.9% lock flush 10 milliLiter(s) IV Push every 1 hour PRN Pre/post blood products, medications, blood draw, and to maintain line patency      Care Discussed with Consultants/Other Providers [ x] YES  [ ] NO    RADIOLOGY & ADDITIONAL TESTS:           INTERVAL HPI/OVERNIGHT EVENTS:   No mesenteric ischemia per CT scan. Lactate 4.1 -> 3.6. Vancomycin trough level therapeutic. Another trough ordered for 1pm 09/21. Fibrinogen 106, didn't get cryo. 2 am labs lateral. Brief episode of bradycardia.     Subjective: Patient seen and examined at bedside. AAOx0.     PHYSICAL EXAM:  GENERAL: ill appearing, intubated, sedation weaned  HEAD:  Atraumatic   EYES: icteric sclera; left pupil slightly larger than right  ENT: nasal packing   CHEST/LUNG: on vent stable FiO2 and PEEP, lungs clear to auscultation b/l  ABDOMEN: large and distended but soft and non-tender.  EXTREMITIES: Difficult to palpate DP, able to lightly palpate left posterior tibial; 2+ pitting edema of LE and cool to touch   NERVOUS SYSTEM:  patient sedated   SKIN: diffuse jaundice throughout body    ICU Vital Signs Last 24 Hrs  T(C): 36.7 (21 Sep 2023 06:03), Max: 37.1 (20 Sep 2023 22:40)  T(F): 98.1 (21 Sep 2023 06:03), Max: 98.8 (20 Sep 2023 22:40)  HR: 86 (21 Sep 2023 06:00) (56 - 187)  BP: 132/60 (20 Sep 2023 22:00) (81/52 - 132/60)  BP(mean): 87 (20 Sep 2023 22:00) (62 - 87)  ABP: 113/49 (21 Sep 2023 06:00) (74/46 - 132/59)  ABP(mean): 70 (21 Sep 2023 06:00) (52 - 81)  RR: 13 (21 Sep 2023 06:00) (12 - 25)  SpO2: 95% (21 Sep 2023 06:00) (91% - 97%)    O2 Parameters below as of 21 Sep 2023 07:00  Patient On (Oxygen Delivery Method): ventilator    O2 Concentration (%): 40        I&O's Detail    20 Sep 2023 07:01  -  21 Sep 2023 07:00  --------------------------------------------------------  IN:    IV PiggyBack: 1750 mL    Lactated Ringers Bolus: 500 mL    Norepinephrine: 214.5 mL    Norepinephrine: 556.3 mL    Octreotide: 230 mL    PRBCs (Packed Red Blood Cells): 300 mL    Propofol: 360.8 mL    Vasopressin: 114 mL  Total IN: 4025.6 mL    OUT:    Nasogastric/Oral tube (mL): 300 mL    Other (mL): 50 mL    Rectal Tube (mL): 500 mL  Total OUT: 850 mL    Total NET: 3175.6 mL    Mode: AC/ CMV (Assist Control/ Continuous Mandatory Ventilation)  RR (machine): 16  TV (machine): 450  FiO2: 40  PEEP: 5  ITime: 1  MAP: 8.7  PIP: 16    LABS:                         7.7    28.11 )-----------( 83       ( 21 Sep 2023 05:46 )             21.7     09-21    136  |  102  |  18  ----------------------------<  66<L>  4.6   |  21<L>  |  1.29    Ca    7.5<L>      21 Sep 2023 05:46  Phos  4.5     09-21  Mg     2.5     09-21    TPro  5.0<L>  /  Alb  3.0<L>  /  TBili  23.7<H>  /  DBili  x   /  AST  211<H>  /  ALT  73<H>  /  AlkPhos  97  09-21    PT/INR - ( 21 Sep 2023 05:46 )   PT: 25.9 sec;   INR: 2.33          PTT - ( 20 Sep 2023 16:39 )  PTT:70.8 sec  Urinalysis Basic - ( 21 Sep 2023 05:46 )    Color: x / Appearance: x / SG: x / pH: x  Gluc: 66 mg/dL / Ketone: x  / Bili: x / Urobili: x   Blood: x / Protein: x / Nitrite: x   Leuk Esterase: x / RBC: x / WBC x   Sq Epi: x / Non Sq Epi: x / Bacteria: x      Lactate, Blood: 3.6 mmol/L (09-21 @ 05:46)  Lactate, Blood: 3.7 mmol/L (09-21 @ 01:41)  Lactate, Blood: 4.1 mmol/L (09-20 @ 21:28)      RADIOLOGY, EKG & ADDITIONAL TESTS: Reviewed.            RADIOLOGY, EKG & ADDITIONAL TESTS: Reviewed.   Consultant(s) Notes Reviewed:  [x ] YES  [ ] NO    MEDICATIONS  (STANDING):  chlorhexidine 0.12% Liquid 15 milliLiter(s) Oral Mucosa every 12 hours  chlorhexidine 2% Cloths 1 Application(s) Topical <User Schedule>  CRRT Treatment    <Continuous>  cyanocobalamin Injectable 1000 MICROGram(s) IntraMuscular every 24 hours  dexMEDEtomidine Infusion 0.2 MICROgram(s)/kG/Hr (4.4 mL/Hr) IV Continuous <Continuous>  folic acid 1 milliGRAM(s) Oral daily  heparin   Injectable 5000 Unit(s) SubCutaneous every 8 hours  lactulose Syrup 30 Gram(s) Oral every 4 hours  levETIRAcetam  IVPB 500 milliGRAM(s) IV Intermittent every 12 hours  multivitamin 1 Tablet(s) Oral daily  mupirocin 2% Nasal 1 Application(s) Both Nostrils two times a day  nafcillin  IVPB      nafcillin  IVPB 2 Gram(s) IV Intermittent every 4 hours  norepinephrine Infusion 0.05 MICROgram(s)/kG/Min (8.25 mL/Hr) IV Continuous <Continuous>  octreotide  Infusion 50 MICROgram(s)/Hr (10 mL/Hr) IV Continuous <Continuous>  oxymetazoline 0.05% Nasal Spray 1 Spray(s) Both Nostrils two times a day  pantoprazole  Injectable 40 milliGRAM(s) IV Push every 12 hours  Phoxillum Filtration BK 4 / 2.5 5000 milliLiter(s) (1300 mL/Hr) CRRT <Continuous>  piperacillin/tazobactam IVPB.. 4.5 Gram(s) IV Intermittent every 8 hours  prednisoLONE    3 mG/mL Solution (ORAPRED) 40 milliGRAM(s) Oral daily  rifAXIMin 550 milliGRAM(s) Oral two times a day    MEDICATIONS  (PRN):  sodium chloride 0.9% lock flush 10 milliLiter(s) IV Push every 1 hour PRN Pre/post blood products, medications, blood draw, and to maintain line patency      Care Discussed with Consultants/Other Providers [ x] YES  [ ] NO    RADIOLOGY & ADDITIONAL TESTS:

## 2023-09-21 NOTE — CONSULT NOTE ADULT - SUBJECTIVE AND OBJECTIVE BOX
**STROKE CODE CONSULT NOTE**    Time of onset of symptoms: Unclear per primary team, however, pupillary assessment at 0500 without anisocoria     HPI: 53M Hx of EtOH abuse and cirrhosis, anemia requiring transfusion in the past, GERD, and a recent un-witnessed fall BIBEMS for altered mental status now intubated and sedated, admitted for further management of  hepatic encephalopathy and hepatorenal syndrome 2/2 alcohol hepatitis and decompensated liver cirrhosis (MELD 41), found to have right frontal hematoma SDH, and possible pancreatitis. Course has been complicated by aspiration PNA. Stroke code was called this AM due to findings of anisocoria, left greater than right, however both pupil briskly reactive to light. NCHCT demonstrated bilateral frontoparietal subdural hemorrhages, with slight increase in hyperdense blood at right frontal convexity since 09/15/2023 and otherwise mild increase in size at places allowing for some redistribution effect. CTA head and neck significant for mixed density plaque at carotid bifurcations. CT perfusion scan negative.     T(C): 36.7 (09-21-23 @ 06:03), Max: 37.1 (09-20-23 @ 22:40)  HR: 83 (09-21-23 @ 12:00) (56 - 187)  BP: 132/60 (09-20-23 @ 22:00) (132/60 - 132/60)  RR: 15 (09-21-23 @ 12:00) (12 - 20)  SpO2: 97% (09-21-23 @ 12:00) (91% - 97%)    PAST MEDICAL & SURGICAL HISTORY:  Cirrhosis      History of alcohol use disorder      GERD (gastroesophageal reflux disease)          FAMILY HISTORY:      SOCIAL HISTORY:   Drinking: EtOH abuse    ROS:   unable to obtain 2/2 intubated and sedated, as well as underlying AMS    MEDICATIONS  (STANDING):  artificial tears (preservative free) Ophthalmic Solution 1 Drop(s) Both EYES daily  ceFAZolin   IVPB 2000 milliGRAM(s) IV Intermittent every 12 hours  chlorhexidine 0.12% Liquid 15 milliLiter(s) Oral Mucosa every 12 hours  chlorhexidine 2% Cloths 1 Application(s) Topical <User Schedule>  CRRT Treatment    <Continuous>  cyanocobalamin Injectable 1000 MICROGram(s) IntraMuscular every 24 hours  folic acid 1 milliGRAM(s) Oral daily  heparin   Injectable 5000 Unit(s) SubCutaneous every 8 hours  lactulose Syrup 30 Gram(s) Oral every 6 hours  levETIRAcetam  IVPB 500 milliGRAM(s) IV Intermittent every 12 hours  meropenem  IVPB 1000 milliGRAM(s) IV Intermittent every 8 hours  multivitamin 1 Tablet(s) Oral daily  mupirocin 2% Nasal 1 Application(s) Both Nostrils two times a day  norepinephrine Infusion 0.05 MICROgram(s)/kG/Min (4.13 mL/Hr) IV Continuous <Continuous>  octreotide  Infusion 50 MICROgram(s)/Hr (10 mL/Hr) IV Continuous <Continuous>  oxymetazoline 0.05% Nasal Spray 1 Spray(s) Both Nostrils two times a day  pantoprazole  Injectable 40 milliGRAM(s) IV Push every 12 hours  Phoxillum Filtration BK 4 / 2.5 5000 milliLiter(s) (4000 mL/Hr) CRRT <Continuous>  propofol Infusion 10 MICROgram(s)/kG/Min (5.28 mL/Hr) IV Continuous <Continuous>  rifAXIMin 550 milliGRAM(s) Oral two times a day  Tranexamic acid 2000 milliGRAM(s) 2000 milliGRAM(s) Nasal once  vasopressin Infusion 0.04 Unit(s)/Min (6 mL/Hr) IV Continuous <Continuous>    MEDICATIONS  (PRN):  sodium chloride 0.9% lock flush 10 milliLiter(s) IV Push every 1 hour PRN Pre/post blood products, medications, blood draw, and to maintain line patency    Allergies    No Known Allergies    Intolerances      Vital Signs Last 24 Hrs  T(C): 36.7 (21 Sep 2023 06:03), Max: 37.1 (20 Sep 2023 22:40)  T(F): 98.1 (21 Sep 2023 06:03), Max: 98.8 (20 Sep 2023 22:40)  HR: 83 (21 Sep 2023 12:00) (56 - 187)  BP: 132/60 (20 Sep 2023 22:00) (132/60 - 132/60)  BP(mean): 87 (20 Sep 2023 22:00) (87 - 87)  RR: 15 (21 Sep 2023 12:00) (12 - 20)  SpO2: 97% (21 Sep 2023 12:00) (91% - 97%)    Parameters below as of 21 Sep 2023 12:00  Patient On (Oxygen Delivery Method): ventilator    O2 Concentration (%): 40    Physical exam:  Constitutional: No acute distress, intubated and sedated  Eyes: scleral icterus moist conjunctivae, see below for CNs  Neck: trachea midline  Cardiovascular: Regular rate and rhythm  Pulmonary: No use of accessory muscles  GI: Abdomen distended  Extremities: bilateral lower extremity edema present.     Neurologic:  -Mental status: Intubated and sedated, does not follow commands.   -Cranial nerves:   II: No blink to threat.  III, IV, VI: L pupil 4mm and brisk, R pupil 3mm and brisk  VII: appears symmetrical  Motor: Decreased tone throughout all extremities. Bilateral upper extremities with minimal withdrawal to noxious stimuli, 2-/5. Bilateral lower extremities 0/5.   Sensation: Upper extremities with minimal withdrawal to noxious stimuli, difficult to determine if sensation intact throughout given patient is on sedation  Reflexes: Corneal reflexes intact bilaterally    NIHSS: 27    Fingerstick Blood Glucose: CAPILLARY BLOOD GLUCOSE        LABS:                        7.7    28.11 )-----------( 83       ( 21 Sep 2023 05:46 )             21.7     09-21    136  |  102  |  18  ----------------------------<  66<L>  4.6   |  21<L>  |  1.29    Ca    7.5<L>      21 Sep 2023 05:46  Phos  4.5     09-21  Mg     2.5     09-21    TPro  5.0<L>  /  Alb  3.0<L>  /  TBili  23.7<H>  /  DBili  x   /  AST  211<H>  /  ALT  73<H>  /  AlkPhos  97  09-21    PT/INR - ( 21 Sep 2023 05:46 )   PT: 25.9 sec;   INR: 2.33          PTT - ( 20 Sep 2023 16:39 )  PTT:70.8 sec      Urinalysis Basic - ( 21 Sep 2023 05:46 )    Color: x / Appearance: x / SG: x / pH: x  Gluc: 66 mg/dL / Ketone: x  / Bili: x / Urobili: x   Blood: x / Protein: x / Nitrite: x   Leuk Esterase: x / RBC: x / WBC x   Sq Epi: x / Non Sq Epi: x / Bacteria: x        RADIOLOGY & ADDITIONAL STUDIES:  < from: CT Brain Stroke Protocol (09.21.23 @ 09:24) >  IMPRESSION:    There are bilateral frontoparietal subdural hemorrhages redemonstrated,   with slight increase in hyperdense blood at right frontal convexity since   09/15/2023 and otherwise mild increase in size at places allowing for   some redistribution effect. No midline shift or hydrocephalus.    No parenchymal hemorrhage or recent transcortical infarct apparent.    < end of copied text >    < from: CT Angio Brain Stroke Protocol  w/ IV Cont (09.21.23 @ 09:26) >    IMPRESSION:    Intracranial CTA: No arterial steno-occlusive disease.    Extracranial CTA: No carotid or vertebral artery significant stenosis or   evidence of dissection. Mixed density plaque at carotid bifurcations.    < end of copied text >    < from: CT Brain Perfusion Maps Stroke (09.21.23 @ 09:25) >  IMPRESSION:  No reported CT perfusion deficit.    < end of copied text >      -----------------------------------------------------------------------------------------------------------------  IV-tPA (Y/N):   N                             Reason IV-tPA not given: unclear LKW, elevated coag studies    **STROKE CODE CONSULT NOTE**    Time of onset of symptoms: Unclear per primary team, however, pupillary assessment at 0500 without anisocoria     HPI: 53M Hx of EtOH abuse and cirrhosis, anemia requiring transfusion in the past, GERD, and a recent un-witnessed fall BIBEMS for altered mental status now intubated and sedated, admitted for further management of  hepatic encephalopathy and hepatorenal syndrome 2/2 alcohol hepatitis and decompensated liver cirrhosis (MELD 41), found to have right frontal hematoma SDH, and possible pancreatitis. Course has been complicated by aspiration PNA. Stroke code was called this AM due to findings of anisocoria, left greater than right, however both pupil briskly reactive to light. NCHCT demonstrated bilateral frontoparietal subdural hemorrhages, with slight increase in hyperdense blood at right frontal convexity since 09/15/2023 and otherwise mild increase in size at places allowing for some redistribution effect. CTA head and neck significant for mixed density plaque at carotid bifurcations. CT perfusion scan negative.     T(C): 36.7 (09-21-23 @ 06:03), Max: 37.1 (09-20-23 @ 22:40)  HR: 83 (09-21-23 @ 12:00) (56 - 187)  BP: 132/60 (09-20-23 @ 22:00) (132/60 - 132/60)  RR: 15 (09-21-23 @ 12:00) (12 - 20)  SpO2: 97% (09-21-23 @ 12:00) (91% - 97%)    PAST MEDICAL & SURGICAL HISTORY:  Cirrhosis  History of alcohol use disorder  GERD (gastroesophageal reflux disease)  FAMILY HISTORY:    SOCIAL HISTORY:   Drinking: EtOH abuse    ROS:   unable to obtain 2/2 intubated and sedated, as well as underlying AMS    MEDICATIONS  (STANDING):  artificial tears (preservative free) Ophthalmic Solution 1 Drop(s) Both EYES daily  ceFAZolin   IVPB 2000 milliGRAM(s) IV Intermittent every 12 hours  chlorhexidine 0.12% Liquid 15 milliLiter(s) Oral Mucosa every 12 hours  chlorhexidine 2% Cloths 1 Application(s) Topical <User Schedule>  CRRT Treatment    <Continuous>  cyanocobalamin Injectable 1000 MICROGram(s) IntraMuscular every 24 hours  folic acid 1 milliGRAM(s) Oral daily  heparin   Injectable 5000 Unit(s) SubCutaneous every 8 hours  lactulose Syrup 30 Gram(s) Oral every 6 hours  levETIRAcetam  IVPB 500 milliGRAM(s) IV Intermittent every 12 hours  meropenem  IVPB 1000 milliGRAM(s) IV Intermittent every 8 hours  multivitamin 1 Tablet(s) Oral daily  mupirocin 2% Nasal 1 Application(s) Both Nostrils two times a day  norepinephrine Infusion 0.05 MICROgram(s)/kG/Min (4.13 mL/Hr) IV Continuous <Continuous>  octreotide  Infusion 50 MICROgram(s)/Hr (10 mL/Hr) IV Continuous <Continuous>  oxymetazoline 0.05% Nasal Spray 1 Spray(s) Both Nostrils two times a day  pantoprazole  Injectable 40 milliGRAM(s) IV Push every 12 hours  Phoxillum Filtration BK 4 / 2.5 5000 milliLiter(s) (4000 mL/Hr) CRRT <Continuous>  propofol Infusion 10 MICROgram(s)/kG/Min (5.28 mL/Hr) IV Continuous <Continuous>  rifAXIMin 550 milliGRAM(s) Oral two times a day  Tranexamic acid 2000 milliGRAM(s) 2000 milliGRAM(s) Nasal once  vasopressin Infusion 0.04 Unit(s)/Min (6 mL/Hr) IV Continuous <Continuous>    MEDICATIONS  (PRN):  sodium chloride 0.9% lock flush 10 milliLiter(s) IV Push every 1 hour PRN Pre/post blood products, medications, blood draw, and to maintain line patency    Allergies  No Known Allergies    Intolerances      Vital Signs Last 24 Hrs  T(C): 36.7 (21 Sep 2023 06:03), Max: 37.1 (20 Sep 2023 22:40)  T(F): 98.1 (21 Sep 2023 06:03), Max: 98.8 (20 Sep 2023 22:40)  HR: 83 (21 Sep 2023 12:00) (56 - 187)  BP: 132/60 (20 Sep 2023 22:00) (132/60 - 132/60)  BP(mean): 87 (20 Sep 2023 22:00) (87 - 87)  RR: 15 (21 Sep 2023 12:00) (12 - 20)  SpO2: 97% (21 Sep 2023 12:00) (91% - 97%)    Parameters below as of 21 Sep 2023 12:00  Patient On (Oxygen Delivery Method): ventilator    O2 Concentration (%): 40    Physical exam:  Constitutional: No acute distress, intubated and sedated  Eyes: scleral icterus moist conjunctivae, see below for CNs  Neck: trachea midline  Cardiovascular: Regular rate and rhythm  Pulmonary: No use of accessory muscles  GI: Abdomen distended  Extremities: bilateral lower extremity edema present.     Neurologic:  -Mental status: Intubated and sedated, does not follow commands.   -Cranial nerves:   II: No blink to threat.  III, IV, VI: L pupil 4mm and brisk, R pupil 3mm and brisk  VII: appears symmetrical  Motor: Decreased tone throughout all extremities. Bilateral upper extremities with minimal withdrawal to noxious stimuli, 2-/5. Bilateral lower extremities 0/5.   Sensation: Upper extremities with minimal withdrawal to noxious stimuli, difficult to determine if sensation intact throughout given patient is on sedation  Reflexes: Corneal reflexes intact bilaterally    NIHSS: 27    Fingerstick Blood Glucose: CAPILLARY BLOOD GLUCOSE        LABS:                        7.7    28.11 )-----------( 83       ( 21 Sep 2023 05:46 )             21.7     09-21    136  |  102  |  18  ----------------------------<  66<L>  4.6   |  21<L>  |  1.29    Ca    7.5<L>      21 Sep 2023 05:46  Phos  4.5     09-21  Mg     2.5     09-21    TPro  5.0<L>  /  Alb  3.0<L>  /  TBili  23.7<H>  /  DBili  x   /  AST  211<H>  /  ALT  73<H>  /  AlkPhos  97  09-21    PT/INR - ( 21 Sep 2023 05:46 )   PT: 25.9 sec;   INR: 2.33          PTT - ( 20 Sep 2023 16:39 )  PTT:70.8 sec      Urinalysis Basic - ( 21 Sep 2023 05:46 )    Color: x / Appearance: x / SG: x / pH: x  Gluc: 66 mg/dL / Ketone: x  / Bili: x / Urobili: x   Blood: x / Protein: x / Nitrite: x   Leuk Esterase: x / RBC: x / WBC x   Sq Epi: x / Non Sq Epi: x / Bacteria: x    RADIOLOGY & ADDITIONAL STUDIES:  < from: CT Brain Stroke Protocol (09.21.23 @ 09:24) >  IMPRESSION:    There are bilateral frontoparietal subdural hemorrhages redemonstrated,   with slight increase in hyperdense blood at right frontal convexity since   09/15/2023 and otherwise mild increase in size at places allowing for   some redistribution effect. No midline shift or hydrocephalus.    No parenchymal hemorrhage or recent transcortical infarct apparent.    < end of copied text >    < from: CT Angio Brain Stroke Protocol  w/ IV Cont (09.21.23 @ 09:26) >    IMPRESSION:    Intracranial CTA: No arterial steno-occlusive disease.    Extracranial CTA: No carotid or vertebral artery significant stenosis or   evidence of dissection. Mixed density plaque at carotid bifurcations.    < end of copied text >    < from: CT Brain Perfusion Maps Stroke (09.21.23 @ 09:25) >  IMPRESSION:  No reported CT perfusion deficit.    < end of copied text >    -----------------------------------------------------------------------------------------------------------------  IV-tPA (Y/N):   N                             Reason IV-tPA not given: unclear LKW, elevated coag studies

## 2023-09-22 NOTE — PROGRESS NOTE ADULT - PROBLEM SELECTOR PROBLEM 5
Decompensation of cirrhosis of liver

## 2023-09-22 NOTE — PROGRESS NOTE ADULT - PROBLEM SELECTOR PROBLEM 4
WILLIAM (acute kidney injury)

## 2023-09-22 NOTE — PROGRESS NOTE ADULT - PROBLEM SELECTOR PLAN 2
.  PPSV = 10%, requires total assistance for all ADLs  -c/w supportive care, turning and positioning

## 2023-09-22 NOTE — DISCHARGE NOTE FOR THE EXPIRED PATIENT - HOSPITAL COURSE
53M PMH of EtOH abuse and cirrhosis, anemia requiring transfusion in the past, and GERD, brought in by EMS for altered mental status with history of mechanical fall night prior. Presented with extensive bruising to left abdomen and flank 2 to 3 days ago, prior to the fall. Admitted to MICU for intubation in the setting of AMS and airway protection. Found to have hepatorenal syndrome and likely hepatic encephalopathy 2/2 alcohol hepatitis and decompensated liver cirrhosis, a right frontal subdural hematoma, WILLIAM with oliguria, and SIRS 2/2 aspiration PA vs SBP. Patient was placed on Levophed drip. Pt had large amounts of blood in mouth during intubation. Cuff was exchanged in the MICU due to leak, and Bronchoscopy was also performed which identified no acute bleed. There continued to be significant bleeding from the nares and mouth with a large clot forming outside the nares. ENT was consulted to come and packed the mouth and nose. Patient began to have episodes of bradycardia on 09/21 and was noted to have anisocoria. Per discussion with family cares, initiated de-escalation of care and withdrew lab draws, imaging, and antibiotics. On 09/22 at 1:40 PM, patient passed away due to cardiac arrest secondary to advanced liver failure/cirrhosis.

## 2023-09-22 NOTE — PROGRESS NOTE ADULT - SUBJECTIVE AND OBJECTIVE BOX
[facility]  Domingo Bennett, Palliative Care Attending  Contact Info: Call [phone] (HEAL Line) or message on Microsoft Teams (Domingo Bennett)    SUBJECTIVE AND OBJECTIVE:  INTERVAL HPI/OVERNIGHT EVENTS: Interval events noted. See patient's PRN use for the past 24hrs noted below. Comprehensive symptom assessment and GOC exploration as noted below. Extensive time spent discussing plan of care with patient/family. No unexpected adverse effects of opiates noted: no sedation/dizziness/nausea.    ALLERGIES:  No Known Allergies    MEDICATIONS  (STANDING):  artificial tears (preservative free) Ophthalmic Solution 1 Drop(s) Both EYES daily  fentaNYL   Infusion. 0.5 MICROgram(s)/kG/Hr (4.4 mL/Hr) IV Continuous <Continuous>  levETIRAcetam  IVPB 500 milliGRAM(s) IV Intermittent every 12 hours  norepinephrine Infusion 0.05 MICROgram(s)/kG/Min (4.13 mL/Hr) IV Continuous <Continuous>  octreotide  Infusion 50 MICROgram(s)/Hr (10 mL/Hr) IV Continuous <Continuous>  oxymetazoline 0.05% Nasal Spray 1 Spray(s) Both Nostrils two times a day  pantoprazole  Injectable 40 milliGRAM(s) IV Push every 12 hours  propofol Infusion 10 MICROgram(s)/kG/Min (5.28 mL/Hr) IV Continuous <Continuous>  Tranexamic acid 2000 milliGRAM(s) 2000 milliGRAM(s) Nasal once  vasopressin Infusion 0.04 Unit(s)/Min (6 mL/Hr) IV Continuous <Continuous>    MEDICATIONS  (PRN):  acetaminophen  Suppository .. 650 milliGRAM(s) Rectal every 6 hours PRN Temp greater or equal to 38C (100.4F), Mild Pain (1 - 3)  bisacodyl Suppository 10 milliGRAM(s) Rectal daily PRN Constipation  glycopyrrolate Injectable 0.4 milliGRAM(s) IV Push four times a day PRN Secretions  HYDROmorphone  Injectable 2 milliGRAM(s) IV Push every 2 hours PRN Moderate pain (4-6), Severe pain (7-10), Respiratory rate greater than 22  LORazepam   Injectable 1 milliGRAM(s) IV Push every 4 hours PRN Anxiety  sodium chloride 0.9% lock flush 10 milliLiter(s) IV Push every 1 hour PRN Pre/post blood products, medications, blood draw, and to maintain line patency      Analgesic Use (Scheduled and PRNs) for past 24 hours:    fentaNYL   Infusion.   4.4 mL/Hr IV Continuous (09-21-23 @ 21:40)    HYDROmorphone  Injectable   2 milliGRAM(s) IV Push (09-22-23 @ 13:48)   2 milliGRAM(s) IV Push (09-22-23 @ 13:14)    levETIRAcetam  IVPB   400 mL/Hr IV Intermittent (09-22-23 @ 05:43)    Phoxillum Filtration BK 4 / 2.5   4000 mL/Hr CRRT (09-22-23 @ 03:17)   4000 mL/Hr CRRT (09-21-23 @ 21:21)    propofol Infusion   5.28 mL/Hr IV Continuous (09-22-23 @ 05:49)   5.28 mL/Hr IV Continuous (09-22-23 @ 03:16)   5.28 mL/Hr IV Continuous (09-21-23 @ 21:06)      ITEMS UNCHECKED ARE NOT PRESENT  PRESENT SYMPTOMS/REVIEW OF SYSTEMS: []Unable to obtain due to poor mentation   Source if other than patient:  []Family   []Team         Vital Signs Last 24 Hrs  T(C): 36.1 (22 Sep 2023 10:06), Max: 36.7 (22 Sep 2023 02:02)  T(F): 97 (22 Sep 2023 10:06), Max: 98.1 (22 Sep 2023 02:02)  HR: 0 (22 Sep 2023 13:40) (0 - 84)  BP: --  BP(mean): --  RR: 0 (22 Sep 2023 13:40) (0 - 20)  SpO2: 84% (22 Sep 2023 13:40) (84% - 100%)    Parameters below as of 22 Sep 2023 12:15  Patient On (Oxygen Delivery Method): ventilator    O2 Concentration (%): 40    LABS: Personally reviewed and interpreted                        7.3    30.07 )-----------( 125      ( 21 Sep 2023 13:05 )             20.9   09-21    137  |  102  |  16  ----------------------------<  50<LL>  4.6   |  21<L>  |  1.18    Ca    7.4<L>      21 Sep 2023 12:46  Phos  4.5     09-21  Mg     2.5     09-21    TPro  5.0<L>  /  Alb  3.0<L>  /  TBili  23.7<H>  /  DBili  x   /  AST  211<H>  /  ALT  73<H>  /  AlkPhos  97  09-21      RADIOLOGY & ADDITIONAL STUDIES: None new    DISCUSSION OF CASE: Family - to provide updates and emotional support; Primary Team/RN - to discuss plan of care [facility]  Dmoingo Bennett, Palliative Care Attending  Contact Info: Call [phone] (HEAL Line) or message on Microsoft Teams (Domingo Bennett)    SUBJECTIVE AND OBJECTIVE:  INTERVAL HPI/OVERNIGHT EVENTS: Interval events noted. See patient's PRN use for the past 24hrs noted below. Comprehensive symptom assessment and GOC exploration as noted below. Extensive time spent discussing plan of care with family. Unable to participate in interview, family in agreement to de-escalate care and allow a natural disease process to unfold. Asked for a .    ALLERGIES:  No Known Allergies    MEDICATIONS  (STANDING):  artificial tears (preservative free) Ophthalmic Solution 1 Drop(s) Both EYES daily  fentaNYL   Infusion. 0.5 MICROgram(s)/kG/Hr (4.4 mL/Hr) IV Continuous <Continuous>  levETIRAcetam  IVPB 500 milliGRAM(s) IV Intermittent every 12 hours  norepinephrine Infusion 0.05 MICROgram(s)/kG/Min (4.13 mL/Hr) IV Continuous <Continuous>  octreotide  Infusion 50 MICROgram(s)/Hr (10 mL/Hr) IV Continuous <Continuous>  oxymetazoline 0.05% Nasal Spray 1 Spray(s) Both Nostrils two times a day  pantoprazole  Injectable 40 milliGRAM(s) IV Push every 12 hours  propofol Infusion 10 MICROgram(s)/kG/Min (5.28 mL/Hr) IV Continuous <Continuous>  Tranexamic acid 2000 milliGRAM(s) 2000 milliGRAM(s) Nasal once  vasopressin Infusion 0.04 Unit(s)/Min (6 mL/Hr) IV Continuous <Continuous>    MEDICATIONS  (PRN):  acetaminophen  Suppository .. 650 milliGRAM(s) Rectal every 6 hours PRN Temp greater or equal to 38C (100.4F), Mild Pain (1 - 3)  bisacodyl Suppository 10 milliGRAM(s) Rectal daily PRN Constipation  glycopyrrolate Injectable 0.4 milliGRAM(s) IV Push four times a day PRN Secretions  HYDROmorphone  Injectable 2 milliGRAM(s) IV Push every 2 hours PRN Moderate pain (4-6), Severe pain (7-10), Respiratory rate greater than 22  LORazepam   Injectable 1 milliGRAM(s) IV Push every 4 hours PRN Anxiety  sodium chloride 0.9% lock flush 10 milliLiter(s) IV Push every 1 hour PRN Pre/post blood products, medications, blood draw, and to maintain line patency      Analgesic Use (Scheduled and PRNs) for past 24 hours:  fentaNYL   Infusion.   4.4 mL/Hr IV Continuous (09-21-23 @ 21:40)  HYDROmorphone  Injectable   2 milliGRAM(s) IV Push (09-22-23 @ 13:48)   2 milliGRAM(s) IV Push (09-22-23 @ 13:14)  levETIRAcetam  IVPB   400 mL/Hr IV Intermittent (09-22-23 @ 05:43)  Phoxillum Filtration BK 4 / 2.5   4000 mL/Hr CRRT (09-22-23 @ 03:17)   4000 mL/Hr CRRT (09-21-23 @ 21:21)  propofol Infusion   5.28 mL/Hr IV Continuous (09-22-23 @ 05:49)   5.28 mL/Hr IV Continuous (09-22-23 @ 03:16)   5.28 mL/Hr IV Continuous (09-21-23 @ 21:06)    ITEMS UNCHECKED ARE NOT PRESENT  PRESENT SYMPTOMS/REVIEW OF SYSTEMS: [x]Unable to obtain due to poor mentation   Source if other than patient:  [x]Family   []Team     Pain: [] yes [x] no  QOL impact -  Location -  Aggravating factors -  Quality -  Radiation -  Timing -  Severity (0-10 scale) -  Minimal acceptable level (0-10 scale) -    PAINAD Score: 0  CPOT Score: 0    Dyspnea:                           []Mild  []Moderate []Severe  Anxiety:                             []Mild []Moderate []Severe  Fatigue:                             []Mild []Moderate []Severe  Nausea:                             []Mild []Moderate []Severe  Loss of appetite:              []Mild []Moderate []Severe  Constipation:                    []Mild []Moderate []Severe    Other Symptoms:  [x]All other review of systems negative     Palliative Performance Status Version 2: 10%    GENERAL:  [] NAD []Alert [x]Lethargic  []Cachexia  [x]Unarousable  []Verbal  [x]Non-Verbal  BEHAVIORAL:   []Anxiety  [x]Delirium []Agitation []Cooperative []Oriented x  HEENT:  []Normal  [] Moist Mucous Membranes []Dry mouth   [x]ET Tube/Trach  []Oral lesions  PULMONARY:   []Clear []Tachypnea  []Audible excessive secretions  [x]Normal Work of Breathing []Labored Breathing  [x]Rhonchi []Crackles []Wheezing  CARDIOVASCULAR:    [x]Regular Rate [x]Regular Rhythm []Irregular []Tachy  []Be  GASTROINTESTINAL:  [x]Soft  [x]Distended   [x]+BS  [x]Non tender []Tender  []PEG [x]OGT/ NGT  Last BM:  GENITOURINARY:  []Normal [] Incontinent   []Oliguria/Anuria   [x]Duron  MUSCULOSKELETAL:   []Normal Extremities  [x]Weakness  [x]Bed/Wheelchair bound []Edema  NEUROLOGIC:   []No focal deficits  []Cognitive impairment  [x]Dysphagia []Dysarthria []Paresis [x]Encephalopathic  SKIN:   [x]Jaundice   []Pressure ulcer(s)  []Rash    CRITICAL CARE:  [x]Shock Present  [ ]Septic [ ]Cardiogenic [ ]Neurologic [ ]Hypovolemic  [x] Vasopressors [ ]Inotropes   [x]Respiratory failure present [x]Mechanical Ventilation [ ]Non-invasive ventilatory support [ ]High-Flow  [x]Acute  [ ]Chronic [x]Hypoxic  [ ]Hypercarbic   [x]Other organ failure: Renal    Vital Signs Last 24 Hrs  T(C): 36.1 (22 Sep 2023 10:06), Max: 36.7 (22 Sep 2023 02:02)  T(F): 97 (22 Sep 2023 10:06), Max: 98.1 (22 Sep 2023 02:02)  HR: 0 (22 Sep 2023 13:40) (0 - 84)  BP: --  BP(mean): --  RR: 0 (22 Sep 2023 13:40) (0 - 20)  SpO2: 84% (22 Sep 2023 13:40) (84% - 100%)    Parameters below as of 22 Sep 2023 12:15  Patient On (Oxygen Delivery Method): ventilator  O2 Concentration (%): 40    LABS: Personally reviewed and interpreted                      7.3    30.07 )-----------( 125      ( 21 Sep 2023 13:05 )             20.9   09-21    137  |  102  |  16  ----------------------------<  50<LL>  4.6   |  21<L>  |  1.18    Ca    7.4<L>      21 Sep 2023 12:46  Phos  4.5     09-21  Mg     2.5     09-21  TPro  5.0<L>  /  Alb  3.0<L>  /  TBili  23.7<H>  /  DBili  x   /  AST  211<H>  /  ALT  73<H>  /  AlkPhos  97  09-21    RADIOLOGY & ADDITIONAL STUDIES: None new    DISCUSSION OF CASE: Family - to provide updates and emotional support; Primary Team/RN - to discuss plan of care

## 2023-09-22 NOTE — PROGRESS NOTE ADULT - ATTENDING SUPERVISION STATEMENT
Fellow
Resident
Fellow
Resident

## 2023-09-22 NOTE — PROGRESS NOTE ADULT - SUBJECTIVE AND OBJECTIVE BOX
Patient is evaluated at bedside. Intubated, icteric, critically ill, evaluated at bedside while tolerating CVVHD. Poor prognosis.     Review of Systems:  Unable to obtain.     PHYSICAL EXAM:  GENERAL: icteric, ETT/MV, requiring pressors, critically ill.   HEENT:  icteric sclera, dry blood around his mouth.   CHEST/LUNG: scattered rales bilaterally, no  rhonchi, wheezing or rubs.   HEART: Regular rate and rhythm, no murmurs.   ABDOMEN: distended, tympanic but soft.  EXTREMITIES: . No clubbing, cyanosis, trace edema   NERVOUS SYSTEM:  RAAS -3, sedated.   SKIN: diffuse jaundice  Access: [facility] HD cath accessed, placed on 9/15.    Allergies:  No Known Allergies    Hospital Medications:   MEDICATIONS  (STANDING):  artificial tears (preservative free) Ophthalmic Solution 1 Drop(s) Both EYES daily  fentaNYL   Infusion. 0.5 MICROgram(s)/kG/Hr (4.4 mL/Hr) IV Continuous <Continuous>  levETIRAcetam  IVPB 500 milliGRAM(s) IV Intermittent every 12 hours  norepinephrine Infusion 0.05 MICROgram(s)/kG/Min (4.13 mL/Hr) IV Continuous <Continuous>  octreotide  Infusion 50 MICROgram(s)/Hr (10 mL/Hr) IV Continuous <Continuous>  oxymetazoline 0.05% Nasal Spray 1 Spray(s) Both Nostrils two times a day  pantoprazole  Injectable 40 milliGRAM(s) IV Push every 12 hours  propofol Infusion 10 MICROgram(s)/kG/Min (5.28 mL/Hr) IV Continuous <Continuous>  Tranexamic acid 2000 milliGRAM(s) 2000 milliGRAM(s) Nasal once  vasopressin Infusion 0.04 Unit(s)/Min (6 mL/Hr) IV Continuous <Continuous>    VITALS:  T(F): 97 (09-22-23 @ 10:06), Max: 98.4 (09-21-23 @ 19:11)  HR: 0 (09-22-23 @ 13:40)  BP: --  RR: 0 (09-22-23 @ 13:40)  SpO2: 84% (09-22-23 @ 13:40)  Wt(kg): --    09-20 @ 07:01  -  09-21 @ 07:00  --------------------------------------------------------  IN: 4025.6 mL / OUT: 850 mL / NET: 3175.6 mL    09-21 @ 07:01 - 09-22 @ 07:00  --------------------------------------------------------  IN: 2509 mL / OUT: 955 mL / NET: 1554 mL    09-22 @ 07:01 - 09-22 @ 13:51  --------------------------------------------------------  IN: 503.6 mL / OUT: 0 mL / NET: 503.6 mL      LABS:  09-21    137  |  102  |  16  ----------------------------<  50<LL>  4.6   |  21<L>  |  1.18    Ca    7.4<L>      21 Sep 2023 12:46  Phos  4.5     09-21  Mg     2.5     09-21    TPro  5.0<L>  /  Alb  3.0<L>  /  TBili  23.7<H>  /  DBili      /  AST  211<H>  /  ALT  73<H>  /  AlkPhos  97  09-21                          7.3    30.07 )-----------( 125      ( 21 Sep 2023 13:05 )             20.9

## 2023-09-22 NOTE — PROGRESS NOTE ADULT - PROBLEM SELECTOR PLAN 3
.  In the setting of metabolic encephalopathy  -ongoing efforts to wean sedation for SBTs, tolerating CPAP  -patient would be a poor candidate for trach/PEG, family agrees this would not be within GOC
.  In the setting of metabolic encephalopathy  -ongoing efforts to wean sedation for SBTs, tolerating CPAP  -patient would be a poor candidate for trach/PEG, family agrees this would not be within GOC
.  In the setting of metabolic encephalopathy  -ongoing efforts to wean sedation for SBTs, tolerating CPAP  -patient would be a poor candidate for trach/PEG, family agrees this would not be within GOC  -s/p compassionate extubation, pre-medicated with Dilaudid/Robinul
.  In the setting of metabolic encephalopathy  -ongoing efforts to wean sedation for SBTs, tolerating CPAP  -patient would be a poor candidate for trach/PEG, family agrees this would not be within GOC  -plan for compassionate extubation tomorrow most likely

## 2023-09-22 NOTE — PROGRESS NOTE ADULT - PROBLEM SELECTOR PLAN 5
.  In the setting of Alcohol Use Disorder, unclear baseline  -poor candidate for transplant given critical illness; given worsening critical illness, transplant is not a realistic possiblity  -MELD = 40; high likelihood of mortality in short-term  -hospice eligible and appropriate
.  In the setting of Alcohol Use Disorder, unclear baseline  -poor candidate for transplant given critical illness, concerned about the likelihood of being transplanted  -MELD = 40; high likelihood of mortality in short-term  -hospice eligible and appropriate
.  In the setting of Alcohol Use Disorder, unclear baseline  -poor candidate for transplant given critical illness, clinical status is not trending towards being a realistic candidate for transplant  -MELD = 40; high likelihood of mortality in short-term  -hospice eligible and appropriate
.  In the setting of Alcohol Use Disorder, unclear baseline  -given worsening critical illness, transplant is not a realistic possibility  -MELD = 40; high likelihood of mortality in short-term  -hospice eligible and appropriate

## 2023-09-22 NOTE — PROGRESS NOTE ADULT - SUBJECTIVE AND OBJECTIVE BOX
INTERVAL HPI/OVERNIGHT EVENTS: JANIYA    Subjective: Patient seen and examined at bedside. AAOx0.     ICU Vital Signs Last 24 Hrs  T(C): 36.7 (22 Sep 2023 02:02), Max: 36.9 (21 Sep 2023 19:11)  T(F): 98.1 (22 Sep 2023 02:02), Max: 98.4 (21 Sep 2023 19:11)  HR: 80 (22 Sep 2023 06:00) (74 - 100)  BP: --  BP(mean): --  ABP: 93/48 (22 Sep 2023 06:00) (91/47 - 139/63)  ABP(mean): 65 (22 Sep 2023 06:00) (63 - 89)  RR: 18 (22 Sep 2023 06:00) (12 - 20)  SpO2: 97% (22 Sep 2023 06:00) (94% - 100%)    O2 Parameters below as of 22 Sep 2023 06:00  Patient On (Oxygen Delivery Method): ventilator    O2 Concentration (%): 40    I&O's Detail    21 Sep 2023 07:01  -  22 Sep 2023 07:00  --------------------------------------------------------  IN:    Cryoprecipitate: 128 mL    IV PiggyBack: 400 mL    Norepinephrine: 434.7 mL    Norepinephrine: 583 mL    Octreotide: 190 mL    Platelets - Single Donor: 227 mL    Propofol: 361.9 mL    Vasopressin: 66 mL    Vasopressin: 48 mL  Total IN: 2438.6 mL    OUT:    Nasogastric/Oral tube (mL): 550 mL    Other (mL): 205 mL    Rectal Tube (mL): 200 mL  Total OUT: 955 mL    Total NET: 1483.6 mL    Mode: AC/ CMV (Assist Control/ Continuous Mandatory Ventilation)  RR (machine): 16  TV (machine): 450  FiO2: 40  PEEP: 5  ITime: 1  MAP: 8.7  PIP: 16    PHYSICAL EXAM:  GENERAL: ill appearing, intubated, sedation weaned  HEAD:  Atraumatic   EYES: icteric sclera; left pupil slightly larger than right  ENT: nasal packing   CHEST/LUNG: on vent stable FiO2 and PEEP, lungs clear to auscultation b/l  ABDOMEN: large and distended but soft and non-tender.  EXTREMITIES: Difficult to palpate DP, able to lightly palpate left posterior tibial; 2+ pitting edema of LE and cool to touch   NERVOUS SYSTEM:  patient sedated   SKIN: diffuse jaundice throughout body    RADIOLOGY, EKG & ADDITIONAL TESTS: Reviewed.   Consultant(s) Notes Reviewed:  [x ] YES  [ ] NO    MEDICATIONS  (STANDING):  chlorhexidine 0.12% Liquid 15 milliLiter(s) Oral Mucosa every 12 hours  chlorhexidine 2% Cloths 1 Application(s) Topical <User Schedule>  CRRT Treatment    <Continuous>  cyanocobalamin Injectable 1000 MICROGram(s) IntraMuscular every 24 hours  dexMEDEtomidine Infusion 0.2 MICROgram(s)/kG/Hr (4.4 mL/Hr) IV Continuous <Continuous>  folic acid 1 milliGRAM(s) Oral daily  heparin   Injectable 5000 Unit(s) SubCutaneous every 8 hours  lactulose Syrup 30 Gram(s) Oral every 4 hours  levETIRAcetam  IVPB 500 milliGRAM(s) IV Intermittent every 12 hours  multivitamin 1 Tablet(s) Oral daily  mupirocin 2% Nasal 1 Application(s) Both Nostrils two times a day  nafcillin  IVPB      nafcillin  IVPB 2 Gram(s) IV Intermittent every 4 hours  norepinephrine Infusion 0.05 MICROgram(s)/kG/Min (8.25 mL/Hr) IV Continuous <Continuous>  octreotide  Infusion 50 MICROgram(s)/Hr (10 mL/Hr) IV Continuous <Continuous>  oxymetazoline 0.05% Nasal Spray 1 Spray(s) Both Nostrils two times a day  pantoprazole  Injectable 40 milliGRAM(s) IV Push every 12 hours  Phoxillum Filtration BK 4 / 2.5 5000 milliLiter(s) (1300 mL/Hr) CRRT <Continuous>  piperacillin/tazobactam IVPB.. 4.5 Gram(s) IV Intermittent every 8 hours  prednisoLONE    3 mG/mL Solution (ORAPRED) 40 milliGRAM(s) Oral daily  rifAXIMin 550 milliGRAM(s) Oral two times a day    MEDICATIONS  (PRN):  sodium chloride 0.9% lock flush 10 milliLiter(s) IV Push every 1 hour PRN Pre/post blood products, medications, blood draw, and to maintain line patency      Care Discussed with Consultants/Other Providers [ x] YES  [ ] NO    RADIOLOGY & ADDITIONAL TESTS:           INTERVAL HPI/OVERNIGHT EVENTS: JANIYA    Subjective: Patient seen and examined at bedside. AAOx0.     ICU Vital Signs Last 24 Hrs  T(C): 36.7 (22 Sep 2023 02:02), Max: 36.9 (21 Sep 2023 19:11)  T(F): 98.1 (22 Sep 2023 02:02), Max: 98.4 (21 Sep 2023 19:11)  HR: 80 (22 Sep 2023 06:00) (74 - 100)  BP: --  BP(mean): --  ABP: 93/48 (22 Sep 2023 06:00) (91/47 - 139/63)  ABP(mean): 65 (22 Sep 2023 06:00) (63 - 89)  RR: 18 (22 Sep 2023 06:00) (12 - 20)  SpO2: 97% (22 Sep 2023 06:00) (94% - 100%)    O2 Parameters below as of 22 Sep 2023 06:00  Patient On (Oxygen Delivery Method): ventilator    O2 Concentration (%): 40    I&O's Detail    21 Sep 2023 07:01  -  22 Sep 2023 07:00  --------------------------------------------------------  IN:    Cryoprecipitate: 128 mL    IV PiggyBack: 400 mL    Norepinephrine: 434.7 mL    Norepinephrine: 583 mL    Octreotide: 190 mL    Platelets - Single Donor: 227 mL    Propofol: 361.9 mL    Vasopressin: 66 mL    Vasopressin: 48 mL  Total IN: 2438.6 mL    OUT:    Nasogastric/Oral tube (mL): 550 mL    Other (mL): 205 mL    Rectal Tube (mL): 200 mL  Total OUT: 955 mL    Total NET: 1483.6 mL    Mode: AC/ CMV (Assist Control/ Continuous Mandatory Ventilation)  RR (machine): 16  TV (machine): 450  FiO2: 40  PEEP: 5  ITime: 1  MAP: 8.7  PIP: 16    PHYSICAL EXAM:  GENERAL: ill appearing, intubated, sedation weaned  HEAD:  Atraumatic   EYES: icteric sclera; left pupil slightly larger than right  ENT: nasal packing   CHEST/LUNG: on vent stable FiO2 and PEEP, lungs clear to auscultation b/l  ABDOMEN: large and distended but soft and non-tender.  EXTREMITIES: Difficult to palpate DP, bilateral posterior tibial pulses palpated; 2+ pitting edema of LE and cool to touch   NERVOUS SYSTEM:  patient sedated   SKIN: diffuse jaundice throughout body    RADIOLOGY, EKG & ADDITIONAL TESTS: Reviewed.   Consultant(s) Notes Reviewed:  [x ] YES  [ ] NO    MEDICATIONS  (STANDING):  chlorhexidine 0.12% Liquid 15 milliLiter(s) Oral Mucosa every 12 hours  chlorhexidine 2% Cloths 1 Application(s) Topical <User Schedule>  CRRT Treatment    <Continuous>  cyanocobalamin Injectable 1000 MICROGram(s) IntraMuscular every 24 hours  dexMEDEtomidine Infusion 0.2 MICROgram(s)/kG/Hr (4.4 mL/Hr) IV Continuous <Continuous>  folic acid 1 milliGRAM(s) Oral daily  heparin   Injectable 5000 Unit(s) SubCutaneous every 8 hours  lactulose Syrup 30 Gram(s) Oral every 4 hours  levETIRAcetam  IVPB 500 milliGRAM(s) IV Intermittent every 12 hours  multivitamin 1 Tablet(s) Oral daily  mupirocin 2% Nasal 1 Application(s) Both Nostrils two times a day  nafcillin  IVPB      nafcillin  IVPB 2 Gram(s) IV Intermittent every 4 hours  norepinephrine Infusion 0.05 MICROgram(s)/kG/Min (8.25 mL/Hr) IV Continuous <Continuous>  octreotide  Infusion 50 MICROgram(s)/Hr (10 mL/Hr) IV Continuous <Continuous>  oxymetazoline 0.05% Nasal Spray 1 Spray(s) Both Nostrils two times a day  pantoprazole  Injectable 40 milliGRAM(s) IV Push every 12 hours  Phoxillum Filtration BK 4 / 2.5 5000 milliLiter(s) (1300 mL/Hr) CRRT <Continuous>  piperacillin/tazobactam IVPB.. 4.5 Gram(s) IV Intermittent every 8 hours  prednisoLONE    3 mG/mL Solution (ORAPRED) 40 milliGRAM(s) Oral daily  rifAXIMin 550 milliGRAM(s) Oral two times a day    MEDICATIONS  (PRN):  sodium chloride 0.9% lock flush 10 milliLiter(s) IV Push every 1 hour PRN Pre/post blood products, medications, blood draw, and to maintain line patency      Care Discussed with Consultants/Other Providers [ x] YES  [ ] NO    RADIOLOGY & ADDITIONAL TESTS:

## 2023-09-22 NOTE — PROGRESS NOTE ADULT - ASSESSMENT
54yo M with PMH of Alcohol Use Disorder, Cirrhosis, Anemia, and GERD p/w encephalopathy and found to have decompensated liver disease. Palliative consulted for complex medical decision making in the setting of critical illness.    ·	family would like to proceed with withdrawal of life support  ·	transitioned to comfort measures only: vent, pressors, CVVHD, and Abx withdrawn  ·	appropriate symptom regimen ordered to optimize comfort

## 2023-09-22 NOTE — PROGRESS NOTE ADULT - PROBLEM SELECTOR PLAN 7
.  Complex medical decision making / symptom management in the setting of critical illness.    Will continue to follow for ongoing GOC discussion / titration of palliative regimen. Emotional support provided, questions answered.  Active Psychosocial Referrals:  [x]Social Work/Case management []PT/OT []Chaplaincy []Hospice  []Patient/Family Support []Holistic RN []Massage Therapy []Music Therapy []Ethics  Coping: [] well [] with difficulty [] poor coping [x] unable to assess  Support system: [] strong [x] adequate [] inadequate    For new or uncontrolled symptoms, please call Palliative Care at [phone]). The service is available 24/7 (including nights & weekends) to provide symptom management recommendations over the phone as appropriate
.  Complex medical decision making / symptom management in the setting of critical illness.    Will continue to follow for ongoing GOC discussion / titration of palliative regimen. Emotional support provided, questions answered.  Active Psychosocial Referrals:  [x]Social Work/Case management []PT/OT []Chaplaincy []Hospice  []Patient/Family Support []Holistic RN []Massage Therapy []Music Therapy []Ethics  Coping: [] well [] with difficulty [] poor coping [x] unable to assess  Support system: [] strong [x] adequate [] inadequate    For new or uncontrolled symptoms, please call Palliative Care at [phone]). The service is available 24/7 (including nights & weekends) to provide symptom management recommendations over the phone as appropriate
.  Complex medical decision making / symptom management in the setting of critical illness.    Will continue to follow for ongoing GOC discussion / titration of palliative regimen. Emotional support provided, questions answered.  Active Psychosocial Referrals:  [x]Social Work/Case management []PT/OT []Chaplaincy []Hospice  [x]Patient/Family Support []Holistic RN []Massage Therapy []Music Therapy []Ethics  Coping: [] well [] with difficulty [] poor coping [x] unable to assess  Support system: [] strong [x] adequate [] inadequate    For new or uncontrolled symptoms, please call Palliative Care at [phone]). The service is available 24/7 (including nights & weekends) to provide symptom management recommendations over the phone as appropriate
.  Complex medical decision making / symptom management in the setting of critical illness.    Will continue to follow for ongoing GOC discussion / titration of palliative regimen. Emotional support provided, questions answered.  Active Psychosocial Referrals:  [x]Social Work/Case management []PT/OT [x]Chaplaincy []Hospice  [x]Patient/Family Support []Holistic RN []Massage Therapy []Music Therapy []Ethics  Coping: [] well [] with difficulty [] poor coping [x] unable to assess  Support system: [] strong [x] adequate [] inadequate    For new or uncontrolled symptoms, please call Palliative Care at [phone]). The service is available 24/7 (including nights & weekends) to provide symptom management recommendations over the phone as appropriate

## 2023-09-22 NOTE — PROGRESS NOTE ADULT - ASSESSMENT
53Y M now with anuric WILLIAM [facility] 2/2 shock/multiorgan failure, now requiring CVVHD for clearance , with  signs of recovery, continue RRT.     Last 24h volume balance: IN: 2509 mL / OUT: 955 mL / NET: 1554 mL    Evaluated at bedside while on CVVHD. Tolerating CVVHD with the following prescription:  CRRT Treatment:   Modality: CVVHD, Filter: NxStage CAR-505, Target Blood Flow: 300 mL/Min  Target Fluid Balance: Titrate to net EVEN fluid balance (09-22-23 @ 08:57) [Discontinued]  Phoxillum Filtration BK 4 / 2.5: Solution, 5000 milliLiter(s) infuse at 2000 mL/Hr; infuse through CRRT Circuit  Administration Instructions: Continuous Renal Replacement Therapy (CRRT)  Special Instructions: Dialysate (09-22-23 @ 08:57) [Discontinued]    -Anuric ATN iso multiorgan failure/shock, on CVVHD:   Cont. CVVHD (net even) will perform daily assessments of blood chemistry and volume status.   Remains anuric, requiring vasopressors. Poor prognosis.   Avoid hypotension, maintain SBP>120 as possible.   Daily weights.   Daily labs while on CVVHD,, P 4.5, check P levels daily.   Strict I&Os. If Duron dc please perform daily bladder scans.

## 2023-09-22 NOTE — PROGRESS NOTE ADULT - PROBLEM SELECTOR PLAN 1
.  -recommend Fentanyl 50mcg IV q2h PRN for Severe Pain, RR >22, or Vent Dyssynchrony  -restarted on sedation overnight
.  -recommend Fentanyl 50mcg IV q2h PRN for Severe Pain, RR >22, or Vent Dyssynchrony  -c/w Precedex
.  -Dilaudid 2mg IV q2h PRN for Moderate/Severe Pain or RR>22  -Ativan 1mg IV q4h PRN for Anxiety/Agitation  -Robinul 0.4mg IV q6h PRN for Excessive Secretions
.  -recommend Fentanyl 50mcg IV q2h PRN for Severe Pain, RR >22, or Vent Dyssynchrony  -restarted on sedation overnight

## 2023-09-22 NOTE — PROGRESS NOTE ADULT - PROBLEM SELECTOR PLAN 4
.  In the setting of decompensated cirrhosis  -currently requiring CVVHD  -family is concerned whether patient would accept long-term HD
.  In the setting of decompensated cirrhosis  -currently requiring CVVHD  -family is concerned whether patient would accept long-term HD
.  In the setting of decompensated cirrhosis  -currently requiring CVVHD, stopped prior to compassionate extubation
.  In the setting of decompensated cirrhosis  -currently requiring CVVHD  -family is concerned whether patient would accept long-term HD  -will likely de-escalate tomorrow

## 2023-09-22 NOTE — PROGRESS NOTE ADULT - NS ATTEST RISK PROBLEM GEN_ALL_CORE FT
Actively Dying  Acute Illness That Poses A Threat To Life  Abrupt Change In Neurological Status  Chronic Illness With Severe Exacerbation/Progression  Decision Made To De-escalate Care Due To Poor Prognosis  Prescription Of Parenteral Controlled Substances
Acute Illness That Poses A Threat To Life  Abrupt Change In Neurological Status  Chronic Illness With Severe Exacerbation/Progression  Decision Made To De-escalate Care Due To Poor Prognosis  Prescription Of Parenteral Controlled Substances
Acute Illness That Poses A Threat To Life  Abrupt Change In Neurological Status  Chronic Illness With Severe Exacerbation/Progression  Prescription Of Parenteral Controlled Substances
Acute Illness That Poses A Threat To Life  Abrupt Change In Neurological Status  Chronic Illness With Severe Exacerbation/Progression  Prescription Of Parenteral Controlled Substances

## 2023-09-22 NOTE — PROGRESS NOTE ADULT - ATTENDING COMMENTS
53 male who presented with alcoholic cirrhosis p/w hepatic encephalopathy and HRS intubated for airway protection and being trated with lactulose and rfaximin, hospital course complicated epistaxis requiring txa packing by ENT and by MSSA PNA being treated with nafcillin/zosyn.  Clinically worsened overnight with rising white count, lactic, and pressor requirement.     central line and arterial line placed.   Antibiotics broadened to include vancomycin/meropenem and ID consulted  will obtain CT angio chest/abdomen/pelvis to rule out intraabdominal infection and rule out ischemia. If possible to also protocol to rule out PE will do so.    Sending DIC labs.
Patient seen, examined, and discussed with Dr. Fisher. Agree with above. 53M with a h/o EtOH cirrhosis, anemia, GERD p/w AMS (s/p intubation), found with acute renal failure, coagulopathy, and MSSA PNA. Decrease lactulose, would opt to remove rectal tube as this often causes rectal ulceration and hematochezia. Would stop steroids as Lille score suggests lack of response. Plan for EGD tomorrow, continue NPO, and blood products as above.     Tete Kim MD  Gastroenterology
events noted, chart reviewed.  Seen and evaluated while on CVVHD  tolerating procedure well  adjusting prescription as noted above
seen and evaluated while on CVVHD  tolerating procedure well  overall condition worsening.  No change to CVVHD prescription today  reviewed with ICU team
52 yo man with anuric ATN, dialysis dependent in setting of HRS, resp failure  rising lactate level  needed several boluses of fluid overnight  seen while on CVVHD- c/w prescription  zero UF  reviewed in detail with ICU team.  clinically worsening
52 yo man with anuric ATN, dialysis dependent in setting of HRS, resp failure  seen again while on CVVHD  prescription adjusted to keep net even today  to follow up
52 yo man with anuric ATN, dialysis dependent in setting of HRS, resp failure  seen while on CVVHD  prescription adjusted to have zero UF  electrolytes okay  c/w present prescsription
I:  Hypotensive on pressors. Liver failure.   A: WILLIAM. Probable HRS.   P: Continue CVVHD for support. Prognosis poor. Keep MAP > 65.
In brief, 53 year old male with alcoholic cirrhosis p/w hepatic encephalopathy and HRS intubated due to concern for airway protection. Hospital course complicated by MSSA PNA being treated with nafcillin/zosyn and severe epistaxis.   Patient is on rifaximin/lactulose for hepatic encephalopathy.  on CVVH for HRS.  Trending INR and GI/hepatology consulted and following. Discussing EGD for assessment for UGIB as reported coffee ground emesis and bloody stools.  ENT following for epistaxis which has been copiously bleeding. Currently packed. Bronch/BAL was performed on 9/15 without active bleed or evidence of DAH.     From a respiratory perspective would not extubate at this time given aspiration risk and inability to protect airway given poor mental status and significant epistaxis.  Hemodynamically the patient is not requiring significantly escalating norepi.    On exam   PERRL  Abdomen Soft.  Extremities warm with palpable pulses    Overall complicated case of worsening liver disease.   Palliative following and helping clarify goals of care.
WILLIAM likely HRS-- cont albumin, pressors  CVVHD for clearance with metabolic acidosis and oligoanuria -- will do slowly to avoid over rapid correction of Na
seen and evaluated while on CVVVHD  tolerating the procedure well-  as above, have changed goals to net even balance from No UF  case discussed with ICU team
Patient is seen at bedside in ICU.  He is on ventilator  On low-dose pressors and hemodynamically in more stable condition  Sedated  Scleral icterus  Moderate muscle wasting  Mild decrease in breath sounds in the right side.  Patient has an area of subcutaneous hematoma in the flank.  Mild edema of extremities.    Patient has significant diarrhea with several liters of stool output as per nursing staff  Small amount of blood-tinged material from NG tube but no melena    Labs and imaging were reviewed    Acute on chronic liver failure related to acute alcoholic hepatitis  Sedated on ventilator  Anuric on CVVH  Hemodynamic management as per ICU.  A trial of albumin 25%  1 vial of 50 mL full times a day  Continue IV antibiotics  Low threshold for antifungal coverage considering acute alcoholic hepatitis, high risk of fungal infection in this patient population and also presence of abdominal wall hematoma  We will further assess his transplant candidacy if he can be extubated.   Consider a bedside echocardiogram to assess his cardiac condition  Diarrhea is likely related to the effect of alcohol and severe illness and his gut.     Will follow
53 M p/w hepatic encephalopathy, hepatorenal syndrome significant bleeding in the setting of hepatic dysfunction including SDH, per family wishes moved towards comfort care
53 male who presented with alcoholic cirrhosis p/w hepatic encephalopathy and HRS following a fall and found to have subdural hematoma intubated for airway protection and being treated with lactulose and rifaximin, hospital course complicated epistaxis requiring txa packing by ENT and by MSSA pneumonia initially being treated with nafcillin/zosyn but ID consulted and broadened yesterday to vancomycin/meropenem/cefazolin after acute worsening in clinical status.     A CT angio chest/abdomen was performed and found no evidence of mesenteric ischemia or PE, but demonstrated RLL consolidation consistent with pneumonia and possibly blood from significant epistaxis.    This morning the patient was noted on exam to have anisocoria with left pupil larger than right and so a stroke code was called and CT demonstrated slight increased in size of prior right SDH. Will transfuse platelets and cryo and discuss with neuro/neurosurgery team. Discussing gravity of illness with family.     In as far as volume status, the patient is 3L positive since yesterday (in the setting of rapidly escalating pressor requirement). Today pressor requirement has been more stable and on POCUS a mix of a-lines and b-lines are noted,  will discuss switching to net even on CVVH with renal team.    Grave prognosis. Will meet with family tomorrow. Family coming in from [place].

## 2023-09-22 NOTE — PROGRESS NOTE ADULT - PROBLEM SELECTOR PLAN 6
.  Patient is DNR, MOLST in chart  -significant other is surrogate decision maker but family/friends are assisting  -continuing current level of care and will re-evaluate on Thurs/Fri when sister arrives  -everyone is in agreement that trach/PEG would not be within GOC if patient cannot be medically extubated
.  Patient is DNR, MOLST in chart  -significant other is surrogate decision maker but family/friends are assisting  -will likely be de-escalating care tomorrow after 11AM family meeting  -everyone is in agreement that trach/PEG would not be within GO    In addition to the E/M visit, an advance care planning meeting was performed. Start time: 1:00PM; End time: 1:46PM; Total time: 46min. A face to face meeting to discuss advance care planning was held today regarding: DEON VARGAS. Primary decision maker: Patient is unable to participate in decision making; Alternate/surrogate: Chau (Significant Other). Discussed advance directives including, but not limited to, healthcare proxy and code status. Decision regarding code status: DNR/DNI; Documentation completed today:[facility] note
.  Patient is DNR, MOLST in chart  -significant other is surrogate decision maker but family/friends are assisting  -continuing current level of care and will re-evaluate on Thurs/Fri when sister arrives  -everyone is in agreement that trach/PEG would not be within GOC if patient cannot be medically extubated
.  Patient is DNR, MOLST in chart  -significant other is surrogate decision maker but family/friends are assisting  -transition to symptom-directed care and de-escalate life support  -patient is actively dying, will not be transitioned to inpatient hospice

## 2023-09-22 NOTE — PROGRESS NOTE ADULT - ASSESSMENT
53M Hx of EtOH abuse and cirrhosis, anemia requiring transfusion in the past, GERD, and a recent un-witnessed fall BIBEMS for altered mental status now intubated and sedated, admitted for further management of  hepatic encephalopathy and hepatorenal syndrome 2/2 alcohol hepatitis and decompensated liver cirrhosis (MELD 41), found to have right frontal hematoma (epidural vs subdural), and possible pancreatitis. Patient DNR/Intubate    Neuro  #Hepatic encephalopathy  Intubated and sedated. Per wife AAOx3 at baseline. AMS unlikely 2/2 SAH vs SDH seen on CT. Ammonia started at 169 9/14 --> 182 9/16. No need to trend  - On rifaximin and lactulose syrup via NGT. Continue with lactulose q6h and continue rifaximin as per GI recs  - Monitor stool output.  - Continue thiamine, folic acid    #Intra-cranial bleed  CT head with extra-axial hematoma in right frontal lobe. ED at [facility] discussed with neurosurgery and suspect bleed to not be causing AMS. s/p vitamin K, Keppra 1g, and FFP.  - Repeat CT head o/n: no significant change in the small right frontal subdural hematoma. Increased opacification of the paranasal sinuses likely due to intubation.  - Neurosurgery onboard: DENNY  -DENNY    #Anisocoria  On exam, left pupil slightly more dilated than right. Stroke code called for urgent evaluation and CT head performed.   CTA results revealing no arterial steno-occlusive disease, no carotid or vertebral artery significant stenosis or evidence of dissection. Mixed density plaque at carotid bifurcations. CT brain perfusion revealing no perfusion defects. CT brain with bilateral frontoparietal subdural hemorrhages redemonstrated, with slight increase in hyperdense blood at right frontal convexity since 09/15/2023 and otherwise mild increase in size at places allowing for some redistribution effect. No midline shift or hydrocephalus. No parenchymal hemorrhage or recent transcortical infarct apparent.Does not correlate with pupillary response changes.   - Follow stroke team recs  - Consult neurosurgery  - F/u EEG  - Obtain repeat CT head in AM     Cardiovascular  #Hepatorenal syndrome  On Levophed for Hepatorenal syndrome. Slightly increased requirements low suspicion for shock.  DENNY    Pulm  #Intubated  #Possible oropharyngeal blood  Findings of considerable blood in oropharynx and nasopharynx requiring suctioning. No additional bleeding. Mouth unpacked. Continued bleeding at the nares rocket inserted, Hgb decreased to 8.3 - 3u pRBC given thus far. ENT came to pack the nares and throat on 09/19.   - Patient still on propofol drips  - Nasal packing in place   - Follow up with ENT to check packing    #Aspiration PNA  Found to be increasingly altered per wife and s/p unwitnessed fall day prior to admission. Received CTX 1g at [facility]. Zosyn and nafcillin discontinued.   - Continue meropenem  - Continue cefazolin as detailed below in ID section  - Follow ID recs    GI  #Decompensated Liver Cirrhosis  #Hepatic encephalopathy - see Neuro  #?SBP  #Alcohol Hepatitis  History of chronic alcohol use presenting with hepatic encephalopathy ammonia 159-->186 -->120 (most recently) today with distended abdomen, soft and non-tender and diffusely jaundice. Bedside POCUS with small pockets of fluid. Negative hepatitis panel, HIV. Hepatology noted that risks outweigh benefits.  - Prednisolone for severe alcohol hepatitis  - MELD score 40 today  - Maddrey score 200 today --> Radha score  - Child Ferguson score 13-15 (unable to assess mental status while sedated)  - Continue thiamine, folic acid  - GI not planning to proceed with EGD at this time  - Family does not want further lab testing   - Would be cautious of steroids given renal failure and bleeding, and if continue would watch very closely for bleeding or infection  - Continue norepi / albumin and can add vasopressin for hepatorenal syndrome treatment, but not a candidate for terlipressin given Cr >5 and vent support    #Melenic stool  1.1L of bright red blood seen in rectal tube.   - GI not planning for EGD at this time     Nephro  #WILLIAM vs WILLIAM on CKD with anuria  #Hepatorenal syndrome  - Norepinephrine with goal MAP 65  - Nephrology onboard: recommend CVVHD.  - Strict I/Os.  - Avoid nephrotoxic med and renally dose meds.  - Discuss GOC with family    Heme  #Epistaxis  No additional bleeding. Mouth packed by ENT 09/19.   - DENNY    #Anemia - stable.  Hb 7.3, .5 likely in setting of alcohol use disorder. History of hemorrhoids. Has ecchymosis on flank however no active bleed and known history of anemia. Partner denies history of esophageal varices.   - Continue Vitamin b12, folic acid 1mg daily.  -No further labs to be drawn as per family's wish      #Thrombocytopenia - worsening.  Platelet count 90 -> 83 likely in the setting of critical illness/alcoholic cirrhosis. Received 1u of cryo and ordered 1u platelets to be tranfused.   - No further labs to be drawn as per family's wish     ID  #Sepsis  Met 2/4 SIRS criteria (leukocytosis, tachycardic) with suspected asp PNA vs SBP. Blood and urine cultures negative at 12 hours. ID recs obtained.   Sputum culture positive for MSSA  - c/w Vanc 750 q12 IV to cover MRSA and coag neg staph due to presence of multiple access lines  - c/w Meropenem 1g q8 IV to cover pseudomonas  - c/w Cefazolin 2g q12 IV (safer in patient with both hepatic and renal impairment)  - of note, the above dosing is for CRRT dosing while pt is on CVVHD- f/u renal recs  - if pt spikes another fever would then recommend collecting another Blood culture  - f/u BCx  - f/u CT scans ordered by primary team    Fluids: n/a  Electrolytes: Caution in setting of suspected WILLIAM  Nutrition: NPO  DVT ppx: Heparin 5000 q8h (prothrombotic state)  GI ppx: Protonix  Code: DNR/Intubate  Dispo: MICU   53M Hx of EtOH abuse and cirrhosis, anemia requiring transfusion in the past, GERD, and a recent un-witnessed fall BIBEMS for altered mental status now intubated and sedated, admitted for further management of  hepatic encephalopathy and hepatorenal syndrome 2/2 alcohol hepatitis and decompensated liver cirrhosis (MELD 41), found to have right frontal hematoma (epidural vs subdural), and possible pancreatitis. Patient DNR/Intubate    Neuro  #Hepatic encephalopathy  Intubated and sedated. Per wife AAOx3 at baseline. AMS unlikely 2/2 SAH vs SDH seen on CT. Ammonia started at 169 9/14 --> 182 9/16. No need to trend  - On rifaximin and lactulose syrup via NGT. Continue with lactulose q6h and continue rifaximin as per GI recs  - Monitor stool output.  - Continue thiamine, folic acid    #Intra-cranial bleed  CT head with extra-axial hematoma in right frontal lobe. ED at [facility] discussed with neurosurgery and suspect bleed to not be causing AMS. s/p vitamin K, Keppra 1g, and FFP.  - Repeat CT head o/n: no significant change in the small right frontal subdural hematoma. Increased opacification of the paranasal sinuses likely due to intubation.  -DENNY    #Anisocoria  On exam, left pupil slightly more dilated than right. Stroke code called for urgent evaluation and CT head performed.   CTA results revealing no arterial steno-occlusive disease, no carotid or vertebral artery significant stenosis or evidence of dissection. Mixed density plaque at carotid bifurcations. CT brain perfusion revealing no perfusion defects. CT brain with bilateral frontoparietal subdural hemorrhages redemonstrated, with slight increase in hyperdense blood at right frontal convexity since 09/15/2023 and otherwise mild increase in size at places allowing for some redistribution effect. No midline shift or hydrocephalus. No parenchymal hemorrhage or recent transcortical infarct apparent.Does not correlate with pupillary response changes.   - Follow stroke team recs  - Consult neurosurgery  - F/u EEG  - No plan for repeat CT head this morning as per family's wishes    Cardiovascular  #Hepatorenal syndrome  On Levophed for Hepatorenal syndrome. Slightly increased requirements low suspicion for shock.  DENNY    Pulm  #Intubated  #Possible oropharyngeal blood  Findings of considerable blood in oropharynx and nasopharynx requiring suctioning. No additional bleeding. Mouth unpacked. Continued bleeding at the nares rocket inserted, Hgb decreased to 8.3 - 3u pRBC given thus far. ENT came to pack the nares and throat on 09/19.   - Patient still on propofol drips  - Nasal packing in place   - Follow up with ENT to check packing    #Aspiration PNA  Found to be increasingly altered per wife and s/p unwitnessed fall day prior to admission. Received CTX 1g at [facility]. Zosyn and nafcillin discontinued.   - Continue meropenem  - Continue cefazolin as detailed below in ID section  - Follow ID recs    GI  #Decompensated Liver Cirrhosis  #Hepatic encephalopathy - see Neuro  #?SBP  #Alcohol Hepatitis  History of chronic alcohol use presenting with hepatic encephalopathy ammonia 159-->186 -->120 (most recently) today with distended abdomen, soft and non-tender and diffusely jaundice. Bedside POCUS with small pockets of fluid. Negative hepatitis panel, HIV. Hepatology noted that risks outweigh benefits.  - Prednisolone for severe alcohol hepatitis  - MELD score 40 today  - Maddrey score 200 today --> Radha score  - Child Ferguson score 13-15 (unable to assess mental status while sedated)  - Continue thiamine, folic acid  - GI not planning to proceed with EGD at this time  - Family does not want further lab testing   - Would be cautious of steroids given renal failure and bleeding, and if continue would watch very closely for bleeding or infection  - Continue norepi / albumin and can add vasopressin for hepatorenal syndrome treatment, but not a candidate for terlipressin given Cr >5 and vent support    #Melenic stool  1.1L of bright red blood seen in rectal tube.   - GI not planning for EGD at this time     Nephro  #WILLIAM vs WILLIAM on CKD with anuria  #Hepatorenal syndrome  - Norepinephrine with goal MAP 65  - Nephrology onboard: recommend CVVHD.  - Strict I/Os.  - Avoid nephrotoxic med and renally dose meds.  - Discuss GOC with family    Heme  #Epistaxis  No additional bleeding. Mouth packed by ENT 09/19.   - DENNY    #Anemia - stable.  Hb 7.3, .5 likely in setting of alcohol use disorder. History of hemorrhoids. Has ecchymosis on flank however no active bleed and known history of anemia. Partner denies history of esophageal varices.   - Continue Vitamin b12, folic acid 1mg daily.  -No further labs to be drawn as per family's wish      #Thrombocytopenia - worsening.  Platelet count 90 -> 83 likely in the setting of critical illness/alcoholic cirrhosis. Received 1u of cryo and ordered 1u platelets to be tranfused.   - No further labs to be drawn as per family's wish     ID  #Sepsis  Met 2/4 SIRS criteria (leukocytosis, tachycardic) with suspected asp PNA vs SBP. Blood and urine cultures negative at 12 hours. ID recs obtained.   Sputum culture positive for MSSA  - c/w Vanc 750 q12 IV to cover MRSA and coag neg staph due to presence of multiple access lines  - c/w Meropenem 1g q8 IV to cover pseudomonas  - c/w Cefazolin 2g q12 IV (safer in patient with both hepatic and renal impairment)  - of note, the above dosing is for CRRT dosing while pt is on CVVHD- f/u renal recs  - if pt spikes another fever would then recommend collecting another Blood culture  - f/u BCx      Fluids: n/a  Electrolytes: Caution in setting of suspected WILLIAM  Nutrition: NPO  DVT ppx: Heparin 5000 q8h (prothrombotic state)  GI ppx: Protonix  Code: DNR/Intubate  Dispo: MICU   53M Hx of EtOH abuse and cirrhosis, anemia requiring transfusion in the past, GERD, and a recent un-witnessed fall BIBEMS for altered mental status now intubated and sedated, admitted for further management of  hepatic encephalopathy and hepatorenal syndrome 2/2 alcohol hepatitis and decompensated liver cirrhosis (MELD 41), found to have right frontal hematoma (epidural vs subdural), and possible pancreatitis. Patient DNR/Intubate    Neuro  #Hepatic encephalopathy  Intubated and sedated. Per wife AAOx3 at baseline. AMS unlikely 2/2 SAH vs SDH seen on CT. Ammonia started at 169 9/14 --> 182 9/16. No need to trend  - On rifaximin and lactulose syrup via NGT. Continue with lactulose q6h and continue rifaximin as per GI recs  - Monitor stool output.  - Continue thiamine, folic acid    #Intra-cranial bleed  CT head with extra-axial hematoma in right frontal lobe. ED at [facility] discussed with neurosurgery and suspect bleed to not be causing AMS. s/p vitamin K, Keppra 1g, and FFP.  - Repeat CT head o/n: no significant change in the small right frontal subdural hematoma. Increased opacification of the paranasal sinuses likely due to intubation.  -DENNY    #Anisocoria  On exam, left pupil slightly more dilated than right. Stroke code called for urgent evaluation and CT head performed.   CTA results revealing no arterial steno-occlusive disease, no carotid or vertebral artery significant stenosis or evidence of dissection. Mixed density plaque at carotid bifurcations. CT brain perfusion revealing no perfusion defects. CT brain with bilateral frontoparietal subdural hemorrhages redemonstrated, with slight increase in hyperdense blood at right frontal convexity since 09/15/2023 and otherwise mild increase in size at places allowing for some redistribution effect. No midline shift or hydrocephalus. No parenchymal hemorrhage or recent transcortical infarct apparent.Does not correlate with pupillary response changes.   - Follow stroke team recs  - Consult neurosurgery  - F/u EEG  - No plan for repeat CT head this morning as per family's wishes    Cardiovascular  #Hepatorenal syndrome  On Levophed for Hepatorenal syndrome. Slightly increased requirements low suspicion for shock.  DENNY    Pulm  #Intubated  #Possible oropharyngeal blood  Findings of considerable blood in oropharynx and nasopharynx requiring suctioning. No additional bleeding. Mouth unpacked. Continued bleeding at the nares rocket inserted, Hgb decreased to 8.3 - 3u pRBC given thus far. ENT came to pack the nares and throat on 09/19.   - Patient still on propofol drips  - Nasal packing in place   - Follow up with ENT to check packing    #Aspiration PNA  Found to be increasingly altered per wife and s/p unwitnessed fall day prior to admission. Received CTX 1g at [facility]. Zosyn and nafcillin discontinued. All remaining antibiotics discontinued as per family's wishes.     GI  #Decompensated Liver Cirrhosis  #Hepatic encephalopathy - see Neuro  #?SBP  #Alcohol Hepatitis  History of chronic alcohol use presenting with hepatic encephalopathy ammonia 159-->186 -->120 (most recently) today with distended abdomen, soft and non-tender and diffusely jaundice. Bedside POCUS with small pockets of fluid. Negative hepatitis panel, HIV. Hepatology noted that risks outweigh benefits.  - Prednisolone for severe alcohol hepatitis  - MELD score 40 today  - Maddrey score 200 today --> Radha score  - Child Ferguson score 13-15 (unable to assess mental status while sedated)  - Continue thiamine, folic acid  - GI not planning to proceed with EGD at this time  - Family does not want further lab testing   - Would be cautious of steroids given renal failure and bleeding, and if continue would watch very closely for bleeding or infection  - Continue norepi / albumin and can add vasopressin for hepatorenal syndrome treatment, but not a candidate for terlipressin given Cr >5 and vent support    #Melenic stool  1.1L of bright red blood seen in rectal tube.   - GI not planning for EGD at this time     Nephro  #WILLIAM vs WILLIAM on CKD with anuria  #Hepatorenal syndrome  - Norepinephrine with goal MAP 65  - Nephrology onboard: recommend CVVHD.  - Strict I/Os.  - Avoid nephrotoxic med and renally dose meds.  - Discuss GOC with family    Heme  #Epistaxis  No additional bleeding. Mouth packed by ENT 09/19.   - DENNY    #Anemia - stable.  Hb 7.3, .5 likely in setting of alcohol use disorder. History of hemorrhoids. Has ecchymosis on flank however no active bleed and known history of anemia. Partner denies history of esophageal varices.   - Continue Vitamin b12, folic acid 1mg daily.  -No further labs to be drawn as per family's wish      #Thrombocytopenia - worsening.  Platelet count 90 -> 83 likely in the setting of critical illness/alcoholic cirrhosis. Received 1u of cryo and ordered 1u platelets to be tranfused.   - No further labs to be drawn as per family's wish     ID  #Sepsis  Met 2/4 SIRS criteria (leukocytosis, tachycardic) with suspected asp PNA vs SBP. Blood and urine cultures negative at 12 hours. ID recs obtained.   Sputum culture positive for MSSA. All antibiotics discontinued as per family's wishes.   -DENNY    Fluids: n/a  Electrolytes: Caution in setting of suspected WILLIAM  Nutrition: NPO  DVT ppx: Heparin 5000 q8h (prothrombotic state)  GI ppx: Protonix  Code: DNR/Intubate  Dispo: MICU

## 2023-09-23 NOTE — EEG REPORT - NS EEG TEXT BOX
[facility] Department of Neurology  Inpatient Continuous video-Electroencephalogram      Patient Name:	DEON VARGAS    :	1970  MRN:	[id number]    Study Start Date/Time:	2023, 1:33:06 PM  Study End Date/Time:	2023, 12:13 PM    Referred by:  Dr. Danny Awan    Brief Clinical History:  DEON VARGAS is a 53 year old Male with AMS and mechanical fall; study performed to investigate for seizures or markers of epilepsy.   Technologist notes: -  Diagnosis Code:  R56.9 convulsions/seizure    Pertinent Medication:  n/a    Acquisition Details:  Electroencephalography was acquired using a minimum of 21 channels on an [employer] system v 9.3.1 with electrode placement according to the standard International 10-20 system following ACNS (American Clinical Neurophysiology Society) guidelines.  Anterior temporal T1 and T2 electrodes were utilized whenever possible.  The [employer] automated spike & seizure detections were all reviewed in detail, in addition to the entire raw EEG.    Findings:  Background:  continuous, with predominantly theta/delta frequencies with overriding beta/alpha.  Generalized Slowing:  Continuous semi-rhythmic delta  Symmetry/Focality: No persistent asymmetries of voltage or frequency.     Voltage:  Normal (20+ uV)  Organization:  Rudimentary  Posterior Dominant Rhythm:  No clear PDR     Sleep:  Absent.  Variability:   Yes		Reactivity:  Unclear    Spontaneous Activity:  No epileptiform discharges   Events:  1)	No electrographic seizures or significant clinical events occurred during this study.  Provocations:  "	Hyperventilation: was not performed.  "	Photic stimulation: was not performed.    FINAL Impression:  Abnormal Continuous/long-term video-EEG  1)	There was moderate continuous intermittent slowing and poor organization of the background without a clear wake or sleep state.    Final Clinical Correlation:  1)	There were indicators of at least moderate diffuse or multifocal cerebral dysfunction  2)	There were no findings of active epilepsy, however this alone does not rule out the diagnosis.        Vidal No MD  Attending Neurologist, [facility]

## 2023-09-26 PROBLEM — Z00.00 ENCOUNTER FOR PREVENTIVE HEALTH EXAMINATION: Status: ACTIVE | Noted: 2023-09-26

## 2023-09-26 LAB — VIRUS SPEC CULT: SIGNIFICANT CHANGE UP

## 2023-09-29 DIAGNOSIS — R16.2 HEPATOMEGALY WITH SPLENOMEGALY, NOT ELSEWHERE CLASSIFIED: ICD-10-CM

## 2023-09-29 DIAGNOSIS — D63.8 ANEMIA IN OTHER CHRONIC DISEASES CLASSIFIED ELSEWHERE: ICD-10-CM

## 2023-09-29 DIAGNOSIS — A41.9 SEPSIS, UNSPECIFIED ORGANISM: ICD-10-CM

## 2023-09-29 DIAGNOSIS — N17.0 ACUTE KIDNEY FAILURE WITH TUBULAR NECROSIS: ICD-10-CM

## 2023-09-29 DIAGNOSIS — K21.9 GASTRO-ESOPHAGEAL REFLUX DISEASE WITHOUT ESOPHAGITIS: ICD-10-CM

## 2023-09-29 DIAGNOSIS — T85.638A LEAKAGE OF OTHER SPECIFIED INTERNAL PROSTHETIC DEVICES, IMPLANTS AND GRAFTS, INITIAL ENCOUNTER: ICD-10-CM

## 2023-09-29 DIAGNOSIS — R53.2 FUNCTIONAL QUADRIPLEGIA: ICD-10-CM

## 2023-09-29 DIAGNOSIS — R13.10 DYSPHAGIA, UNSPECIFIED: ICD-10-CM

## 2023-09-29 DIAGNOSIS — I61.1 NONTRAUMATIC INTRACEREBRAL HEMORRHAGE IN HEMISPHERE, CORTICAL: ICD-10-CM

## 2023-09-29 DIAGNOSIS — D68.9 COAGULATION DEFECT, UNSPECIFIED: ICD-10-CM

## 2023-09-29 DIAGNOSIS — Z87.891 PERSONAL HISTORY OF NICOTINE DEPENDENCE: ICD-10-CM

## 2023-09-29 DIAGNOSIS — R04.0 EPISTAXIS: ICD-10-CM

## 2023-09-29 DIAGNOSIS — F10.10 ALCOHOL ABUSE, UNCOMPLICATED: ICD-10-CM

## 2023-09-29 DIAGNOSIS — E87.1 HYPO-OSMOLALITY AND HYPONATREMIA: ICD-10-CM

## 2023-09-29 DIAGNOSIS — Z78.1 PHYSICAL RESTRAINT STATUS: ICD-10-CM

## 2023-09-29 DIAGNOSIS — R65.21 SEVERE SEPSIS WITH SEPTIC SHOCK: ICD-10-CM

## 2023-09-29 DIAGNOSIS — K70.11 ALCOHOLIC HEPATITIS WITH ASCITES: ICD-10-CM

## 2023-09-29 DIAGNOSIS — K85.90 ACUTE PANCREATITIS WITHOUT NECROSIS OR INFECTION, UNSPECIFIED: ICD-10-CM

## 2023-09-29 DIAGNOSIS — K76.6 PORTAL HYPERTENSION: ICD-10-CM
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
## 2023-09-29 DIAGNOSIS — E87.20 ACIDOSIS, UNSPECIFIED: ICD-10-CM

## 2023-09-29 DIAGNOSIS — D69.6 THROMBOCYTOPENIA, UNSPECIFIED: ICD-10-CM

## 2023-09-29 DIAGNOSIS — J96.01 ACUTE RESPIRATORY FAILURE WITH HYPOXIA: ICD-10-CM

## 2023-09-29 DIAGNOSIS — J15.211 PNEUMONIA DUE TO METHICILLIN SUSCEPTIBLE STAPHYLOCOCCUS AUREUS: ICD-10-CM

## 2023-09-29 DIAGNOSIS — K70.30 ALCOHOLIC CIRRHOSIS OF LIVER WITHOUT ASCITES: ICD-10-CM

## 2023-09-29 DIAGNOSIS — K76.7 HEPATORENAL SYNDROME: ICD-10-CM

## 2023-09-29 DIAGNOSIS — Z66 DO NOT RESUSCITATE: ICD-10-CM

## 2023-09-29 DIAGNOSIS — K76.82 HEPATIC ENCEPHALOPATHY: ICD-10-CM

## 2023-09-29 DIAGNOSIS — K62.5 HEMORRHAGE OF ANUS AND RECTUM: ICD-10-CM

## 2023-09-29 DIAGNOSIS — K76.0 FATTY (CHANGE OF) LIVER, NOT ELSEWHERE CLASSIFIED: ICD-10-CM

## 2023-09-29 DIAGNOSIS — J69.0 PNEUMONITIS DUE TO INHALATION OF FOOD AND VOMIT: ICD-10-CM

## 2023-10-14 LAB
CULTURE RESULTS: SIGNIFICANT CHANGE UP
SPECIMEN SOURCE: SIGNIFICANT CHANGE UP

## 2024-05-20 NOTE — PATIENT PROFILE ADULT - FUNCTIONAL ASSESSMENT - BASIC MOBILITY 6.
20-May-2024 10:20
 2-calculated by average/Not able to assess (calculate score using [facility] averaging method)

## 2024-10-10 NOTE — ED PROVIDER NOTE - ADMIT DISPOSITION PRESENT ON ADMISSION SEPSIS
No Indication: Provided medical care services as part of ongoing care related to the patient's single, serious or complex chronic condition. Detail Level: Simple Do Not Use If Visit Has Modifier 25 And Other Service Is Not A Preventive Service, Immunization, Or Annual Wellness Visit: : Visit complexity inherent to evaluation and management associated with medical care services that serve as the continuing focal point for all needed health care services and/or with medical care services that are part of ongoing care related to a patient’s single, serious, or complex chronic condition

## 2024-11-07 NOTE — ED ADULT NURSE NOTE - NS ED NURSE RECORD ANOTHER HT AND WT
Consent: The patient's consent was obtained including but not limited to risks of crusting, scabbing, blistering, scarring, darker or lighter pigmentary change, recurrence, incomplete removal and infection. Show Topical Anesthesia Variable?: Yes Spray Paint Technique: No Medical Necessity Information: It is in your best interest to select a reason for this procedure from the list below. All of these items fulfill various CMS LCD requirements except the new and changing color options. Duration Of Freeze Thaw-Cycle (Seconds): 5-10 Number Of Freeze-Thaw Cycles: 2 freeze-thaw cycles Spray Paint Text: The liquid nitrogen was applied to the skin utilizing a spray paint frosting technique. Medical Necessity Clause: This procedure was medically necessary because the lesions that were treated were: Detail Level: Detailed Post-Care Instructions: I reviewed with the patient Yes

## 2024-11-26 NOTE — CONSULT NOTE ADULT - CONSULT REQUESTED BY NAME
[facility]  Pain Clinic     11/26/24 interval hx:  Patient presents for follow-up after a L5-S1 LESI on 8/29/24. He had two prior LESi at both L4-L5 and L5-S1. He states the L5-S1 had better results in both intensity and duration, lasting about 2.5 months. He was walking a couple weeks ago and started feeling the sharp heel pain coming back. It is now back to a 9/10 pain. He presents today for another LESI.     Today's Pain Characteristics:  Location: Low back with radiation into a right posterior-lateral thigh into anterior shin and dorsum foot and heel  Duration: last two weeks, received 3months of relief since last injection.  Context: Constant with intermittent exacerbations  Quality: Sharp, gnawing, aching, burning, electrical  Pain Scale: Now  9/10.  Worsening: Exercise, walking, lifting  Alleviating: Sitting, rest, oral meds.  Tingling/Numbness: Yes  Weakness: No    Saddle anesthesia / BB Incontinence: No     8/29/24 interval hx:  Lazaro presents for f/u. He states his pain went from a 10/10 to 4/10 immediately after LESI on 4/23. Pain returned about 1.5 months later. He rates his pain 10/10 today. It radiates from his low back to posterior R leg down to his heel. Changes to gabapentin and flexeril dosing has provided relief. He is interested in a second LESI today.     Today's Pain Characteristics:  Location: Low back with radiation into a right posterior-lateral thigh into anterior shin and dorsum foot and heel  Duration: Chronic > 3 months  Context: Constant with intermittent exacerbations  Quality: Gnawing, aching, burning, electrical  Pain Scale: Now 7/10, At worst 9/10.  Worsening: Exercise, walking, lifting  Alleviating: Sitting, rest, oral meds.  Tingling/Numbness: Yes  Weakness: No    Saddle anesthesia / BB Incontinence: No         6/25/24 interval hx:  Patient presents for f/u. He notes ~80% relief s/p L4/5 LESI on 4/23/24. He states he feels his RLE radicular pain has 
MICU
Lv
been since getting progressively worse. He notes some relief with Gabapentin, though states he has been only taking on weekends due to increased drowsiness. He continues home exercises with minimal relief.     Original HPI: 4/23/24  Chief Complaint Right leg pain   HISTORY OF PRESENT ILLNESS:   The patient is a 48 year old male PMH desmoid tumor abdomen s/p laparoscopic resection 09/2023, CAD, HTN, obesity (BMI:47) who was referred by Family Medicine for Right Leg radiculopathy. Pain started a year ago, intensified about 7 months ago s/p abdominal surgery, currently 5/10 and increases to 7/10 w/ movement/ walking. Pain described as radiating as a burning sensation down the front thigh to the shin. Intensifies with walking and lying flat (spinal extension).       Medication Regimen:  - Gabapentin 100mg BID, flexeril 10mg nightly PRN    Conservative modalities attempted:  - Physical therapy: Yes  - Home PT exercises/stretches: Yes  - Acetaminophen: Yes  - NSAIDs: Yes  - Oral Steroids: Yes    Denies anticoagulation.   Denies current infection/antibiotic use.  Denies allergy to latex,  local anesthetic, steroid, or contrast dye.     Opioid Therapy  External record: [place] prescription monitoring program (PDMP) checked: Yes  UDS: N/a    Current Medications:  .  Current Outpatient Medications   Medication Sig Dispense Refill    valsartan-hydrochlorothiazide (DIOVAN-HCT) 320-25 MG per tablet Take 1 tablet by mouth every morning. FOR BLOOD PRESSURE 90 tablet 3    pantoprazole (PROTONIX) 40 MG tablet Take 40 mg by mouth daily.      imatinib (GLEEVEC) 400 MG tablet Take 1 tablet by mouth daily. 30 tablet 3    gabapentin (NEURONTIN) 100 MG capsule Take 1 capsule by mouth in the morning and 1 capsule in the evening. (Patient not taking: Reported on 11/4/2024) 180 capsule 0    triamcinolone (ARISTOCORT) 0.1 % cream Apply 1 application. topically in the morning and 1 application. in the evening. As needed for rash on left lower 
MICU
leg for up 2 weeks 45 g 0    imatinib (GLEEVEC) 400 MG tablet Take 1 tablet by mouth daily. Do not start before April 25, 2024. 30 tablet 3    amLODIPine (NORVASC) 10 MG tablet Take 1 tablet by mouth in the morning and 1 tablet in the evening. FOR BLOOD PRESSURE 180 tablet 1    VITAMIN D, CHOLECALCIFEROL, PO Take 1 Dose by mouth daily.      atorvastatin (LIPITOR) 80 MG tablet Take 1 tablet by mouth daily. 90 tablet 1     No current facility-administered medications for this encounter.        Allergies: .ALLERGIES:  No Known Allergies     REVIEW OF SYSTEMS:  Denies fever, chills, weight changes,  vision changes, CP, SOB, N/V/D, abd pain, dysuria, hematuria, new weakness, loss of bowel or bladder function     PHYSICAL EXAM:  Vitals:  Visit Vitals  /88 (BP Location: RUE - Right upper extremity, Patient Position: Sitting)   Pulse 79   Temp 97.9 °F (36.6 °C) (Tympanic)   Resp 20   Ht 5' 9.29\" (1.76 m)   Wt (!) 151 kg (333 lb)   SpO2 100%   BMI 48.76 kg/m²    I Body mass index is 48.76 kg/m².    Physical Exam    ---------------PHYSICAL EXAMINATION  --------------------     Visit Vitals  /88 (BP Location: RUE - Right upper extremity, Patient Position: Sitting)   Pulse 79   Temp 97.9 °F (36.6 °C) (Tympanic)   Resp 20   Ht 5' 9.29\" (1.76 m)   Wt (!) 151 kg (333 lb)   SpO2 100%   BMI 48.76 kg/m²          CONSITUTIONAL: A&O x3. Well appearing, no acute distress.   CV: Normal HR on pulse ox.   PULM: NL breathing on room air.   PSYCH: Mood and affect appropriate.  SKIN: Skin color, texture, turgor normal, no rashes or lesions.    GAIT: Antalgic gait, ambulates without assistance.   SPINE: Normal ROM in lumbar flexion. Decreased ROM in extension. NTTP over spinous processes. +TTP over lumbar paraspinal muscles. Straight leg raise positive in sitting and supine position.  MUSCULOSKELETAL: 5/5 motor strength throughout lower extremities. No atrophy or tone abnormalities.    NEUROLOGIC:  Sensation intact to light touch 
throughout lumbosacral dermatomes,   Reflexes: 2+ patellar, achilles reflexes. No Clonus.       Imaging:  I have independently reviewed and interpreted the following images and reviewed them with the patient.     11/2023 Lumbar MRI    FINDINGS:     Coronal view shows leftward scoliosis of the lower thoracic and upper  lumbar spine. Multilevel moderate to severe disc base height loss  throughout the lumbar and lower thoracic spine. Multilevel endplate  osteophytes with facet degeneration.  Slight reversal of the normal lumbar lordosis. Minimal retrolisthesis of L4  on 5 and L5 on S1.     Lower thoracic levels obscured by artifact. Endplate disc osteophyte  formation and facet degeneration. Cause mild encroachment in the central  canal and foraminal zones, incompletely evaluated.     T12-L1: Facet degeneration with endplate disc osteophyte formation, greater  on the right. Mild central narrowing and foraminal stenosis.     L1-2: Endplate disc osteophyte formation and facet degeneration. Mild  encroachment on the central canal and foraminal zones.     L2-3: Facet degeneration with endplate disc osteophyte formation. Mild  foraminal stenosis and central narrowing.     L3-4: Facet degenerative change with ligamentum flavum thickening, greater  on the left. Endplate disc osteophyte formation mild central stenosis.  Moderate right and mild left foraminal narrowing.     L4-5: Facet degeneration and ligamentum flavum thickening. Endplate disc  osteophyte formation. Moderate foraminal stenosis with mild central  narrowing.     L5-S1: Endplate disc osteophyte formation and facet degeneration. Severe  foraminal stenoses with mild central narrowing.     The distal spinal cord is unremarkable.     Extraspinal soft tissues are unremarkable.     IMPRESSION:  Multilevel lumbar degenerative changes characterized by severe disc base  narrowing, endplate osteophytosis and facet degeneration. This appears to  lead to severe foraminal 
Arpita Dahl
MICU
Oks
stenosis and mild central narrowing. Details  above.        10/2023 Lumbar XR   Findings and impression:     Loss of normal lumbar lordosis. Alignment is intact. Vertebral body heights  are preserved without a fracture. Multilevel intervertebral disc height  loss most significant at L5-S1. Marginal and ventral osteophytes are  present. Facet arthropathy at L5-S1. There is suggestion of foraminal  narrowing at L5-S1.    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     The patient has failed conservative measures for greater than 3 months including physical therapy x6 weeks / physician-directed HEP within 6 weeks; multimodal analgesics including Acetaminophen, NSAIDs, PO steroids, and gabapentin/flexeril.      Moderate response to his first LESI at L4-L5.  Given that he has new posterior symptoms with heel pain recommend we proceed with an LESI at L5-S1.  This will prolong his relief.   The patient previously  has undergone an epidural steroid injection with greater than 80% relief (pre  injection score 8/10, post injections score 2/10),  improvement in quality of life,  and reduction in pain medication dependence. Current Pain rated 7/10. The patient has failed conservative treatment including all medications, rest, PT within the last 2 months and currently the patient is in active ongoing PT without benefit.   Given the great success with previous epidural, the patient would benefit from a repeat epidural steroid injection.  Will target LESI.        Diagnosis:  1. L4/5, L5/S1 radiculopathy    Provider thought process:     Patient has access to \"Information on nonopioid Alternatives for the Treatment of Pain\" educational pamphlet kept in the waiting room. We also discussed both risk and benefits of our 
Team
treatment plan and the [place] understands[place] Pertinent imaging and notes reviews.  Discussed include but were not limited to non opioid medicinal products or drug products, interventional procedures and/or treatments, acupuncture, massage therapy, PT and OT.              ____________________ Treatment Recommendations _____________    Injection / Procedure: L5/S1 LESI       Imaging / Tests: None ordered     Medications: None prescribed  Physical therapy: None prescribed  HEP/Activity: Continue as instructed/tolerated  Referrals: None  Follow up: 6-8 weeks    Piotr Alexandra MD  [facility]    This note was created using the Dragon voice recognition system. Errors in content may be related to improper recognition of the system. Effort to review and correct the note has been made but irregularities may still be present.           
Oks
